# Patient Record
Sex: MALE | Race: WHITE | Employment: OTHER | URBAN - METROPOLITAN AREA
[De-identification: names, ages, dates, MRNs, and addresses within clinical notes are randomized per-mention and may not be internally consistent; named-entity substitution may affect disease eponyms.]

---

## 2017-01-04 RX ORDER — EMPAGLIFLOZIN AND METFORMIN HYDROCHLORIDE 5; 1000 MG/1; MG/1
TABLET ORAL
Qty: 60 TABLET | Refills: 2 | OUTPATIENT
Start: 2017-01-04

## 2017-01-05 LAB
ANION GAP SERPL CALCULATED.3IONS-SCNC: 9 MMOL/L (ref 4–12)
BUN BLDV-MCNC: 22 MG/DL (ref 7–20)
CALCIUM SERPL-MCNC: 9.9 MG/DL (ref 8.8–10.5)
CHLORIDE BLD-SCNC: 103 MEQ/L (ref 101–111)
CO2: 27 MEQ/L (ref 21–32)
CREAT SERPL-MCNC: 1 MG/DL (ref 0.7–1.3)
CREATININE CLEARANCE: >60
ESTIMATED AVERAGE GLUCOSE: 157 MG/DL
GLUCOSE, FASTING: 142 MG/DL (ref 70–110)
HBA1C MFR BLD: 7.1 % (ref 4.4–6.4)
POTASSIUM SERPL-SCNC: 4.3 MEQ/L (ref 3.6–5)
SODIUM BLD-SCNC: 139 MEQ/L (ref 135–145)

## 2017-01-11 RX ORDER — EMPAGLIFLOZIN AND METFORMIN HYDROCHLORIDE 5; 1000 MG/1; MG/1
TABLET ORAL
Qty: 60 TABLET | Refills: 2 | OUTPATIENT
Start: 2017-01-11

## 2017-01-11 RX ORDER — EMPAGLIFLOZIN AND METFORMIN HYDROCHLORIDE 5; 1000 MG/1; MG/1
TABLET ORAL
Qty: 60 TABLET | Refills: 0 | Status: SHIPPED | OUTPATIENT
Start: 2017-01-11 | End: 2017-01-16 | Stop reason: SDUPTHER

## 2017-01-16 RX ORDER — EMPAGLIFLOZIN AND METFORMIN HYDROCHLORIDE 5; 1000 MG/1; MG/1
TABLET ORAL
Qty: 14 TABLET | Refills: 0 | COMMUNITY
Start: 2017-01-16 | End: 2017-02-27 | Stop reason: SDUPTHER

## 2017-02-27 ENCOUNTER — OFFICE VISIT (OUTPATIENT)
Dept: FAMILY MEDICINE CLINIC | Age: 57
End: 2017-02-27

## 2017-02-27 VITALS
HEART RATE: 76 BPM | RESPIRATION RATE: 16 BRPM | BODY MASS INDEX: 31.73 KG/M2 | DIASTOLIC BLOOD PRESSURE: 64 MMHG | WEIGHT: 221.6 LBS | HEIGHT: 70 IN | SYSTOLIC BLOOD PRESSURE: 118 MMHG

## 2017-02-27 DIAGNOSIS — E78.5 HYPERLIPIDEMIA, UNSPECIFIED HYPERLIPIDEMIA TYPE: ICD-10-CM

## 2017-02-27 DIAGNOSIS — Z72.0 TOBACCO ABUSE: ICD-10-CM

## 2017-02-27 DIAGNOSIS — I25.10 CORONARY ARTERY DISEASE INVOLVING NATIVE CORONARY ARTERY OF NATIVE HEART WITHOUT ANGINA PECTORIS: ICD-10-CM

## 2017-02-27 DIAGNOSIS — I10 ESSENTIAL HYPERTENSION: ICD-10-CM

## 2017-02-27 DIAGNOSIS — J44.9 CHRONIC OBSTRUCTIVE PULMONARY DISEASE, UNSPECIFIED COPD TYPE (HCC): Primary | ICD-10-CM

## 2017-02-27 DIAGNOSIS — Z12.5 SCREENING FOR PROSTATE CANCER: ICD-10-CM

## 2017-02-27 PROCEDURE — G8484 FLU IMMUNIZE NO ADMIN: HCPCS | Performed by: FAMILY MEDICINE

## 2017-02-27 PROCEDURE — 99214 OFFICE O/P EST MOD 30 MIN: CPT | Performed by: FAMILY MEDICINE

## 2017-02-27 PROCEDURE — 4004F PT TOBACCO SCREEN RCVD TLK: CPT | Performed by: FAMILY MEDICINE

## 2017-02-27 PROCEDURE — G8598 ASA/ANTIPLAT THER USED: HCPCS | Performed by: FAMILY MEDICINE

## 2017-02-27 PROCEDURE — 3023F SPIROM DOC REV: CPT | Performed by: FAMILY MEDICINE

## 2017-02-27 PROCEDURE — G8427 DOCREV CUR MEDS BY ELIG CLIN: HCPCS | Performed by: FAMILY MEDICINE

## 2017-02-27 PROCEDURE — 3017F COLORECTAL CA SCREEN DOC REV: CPT | Performed by: FAMILY MEDICINE

## 2017-02-27 PROCEDURE — 3045F PR MOST RECENT HEMOGLOBIN A1C LEVEL 7.0-9.0%: CPT | Performed by: FAMILY MEDICINE

## 2017-02-27 PROCEDURE — G8926 SPIRO NO PERF OR DOC: HCPCS | Performed by: FAMILY MEDICINE

## 2017-02-27 PROCEDURE — G8417 CALC BMI ABV UP PARAM F/U: HCPCS | Performed by: FAMILY MEDICINE

## 2017-02-27 RX ORDER — EMPAGLIFLOZIN AND METFORMIN HYDROCHLORIDE 5; 1000 MG/1; MG/1
TABLET ORAL
Qty: 60 TABLET | Refills: 11 | Status: SHIPPED | OUTPATIENT
Start: 2017-02-27 | End: 2018-04-13

## 2017-02-27 ASSESSMENT — PATIENT HEALTH QUESTIONNAIRE - PHQ9
SUM OF ALL RESPONSES TO PHQ9 QUESTIONS 1 & 2: 0
1. LITTLE INTEREST OR PLEASURE IN DOING THINGS: 0
2. FEELING DOWN, DEPRESSED OR HOPELESS: 0
SUM OF ALL RESPONSES TO PHQ QUESTIONS 1-9: 0

## 2017-02-27 ASSESSMENT — ENCOUNTER SYMPTOMS
GASTROINTESTINAL NEGATIVE: 1
RESPIRATORY NEGATIVE: 1

## 2017-08-15 ENCOUNTER — APPOINTMENT (OUTPATIENT)
Dept: CT IMAGING | Age: 57
End: 2017-08-15
Payer: COMMERCIAL

## 2017-08-15 ENCOUNTER — HOSPITAL ENCOUNTER (EMERGENCY)
Age: 57
Discharge: HOME | End: 2017-08-15
Attending: FAMILY MEDICINE
Payer: COMMERCIAL

## 2017-08-15 VITALS
TEMPERATURE: 98 F | DIASTOLIC BLOOD PRESSURE: 80 MMHG | OXYGEN SATURATION: 100 % | BODY MASS INDEX: 29.49 KG/M2 | HEIGHT: 70 IN | SYSTOLIC BLOOD PRESSURE: 125 MMHG | WEIGHT: 206 LBS | HEART RATE: 68 BPM | RESPIRATION RATE: 18 BRPM

## 2017-08-15 DIAGNOSIS — K02.9 DENTAL CARIES: ICD-10-CM

## 2017-08-15 DIAGNOSIS — K04.7 DENTAL INFECTION: Primary | ICD-10-CM

## 2017-08-15 DIAGNOSIS — H61.23 BILATERAL IMPACTED CERUMEN: ICD-10-CM

## 2017-08-15 LAB
ANION GAP SERPL CALCULATED.3IONS-SCNC: 16 MEQ/L (ref 8–16)
BASOPHILS # BLD: 0.6 %
BASOPHILS ABSOLUTE: 0.1 THOU/MM3 (ref 0–0.1)
BUN BLDV-MCNC: 19 MG/DL (ref 7–22)
CALCIUM SERPL-MCNC: 9.4 MG/DL (ref 8.5–10.5)
CHLORIDE BLD-SCNC: 104 MEQ/L (ref 98–111)
CO2: 21 MEQ/L (ref 23–33)
CREAT SERPL-MCNC: 1 MG/DL (ref 0.4–1.2)
EOSINOPHIL # BLD: 1.1 %
EOSINOPHILS ABSOLUTE: 0.1 THOU/MM3 (ref 0–0.4)
GFR SERPL CREATININE-BSD FRML MDRD: 77 ML/MIN/1.73M2
GLUCOSE BLD-MCNC: 211 MG/DL (ref 70–108)
HCT VFR BLD CALC: 46.6 % (ref 42–52)
HEMOGLOBIN: 16 GM/DL (ref 14–18)
LYMPHOCYTES # BLD: 24.2 %
LYMPHOCYTES ABSOLUTE: 2.3 THOU/MM3 (ref 1–4.8)
MCH RBC QN AUTO: 33 PG (ref 27–31)
MCHC RBC AUTO-ENTMCNC: 34.5 GM/DL (ref 33–37)
MCV RBC AUTO: 95.8 FL (ref 80–94)
MONOCYTES # BLD: 9.1 %
MONOCYTES ABSOLUTE: 0.9 THOU/MM3 (ref 0.4–1.3)
NUCLEATED RED BLOOD CELLS: 0 /100 WBC
OSMOLALITY CALCULATION: 289.8 MOSMOL/KG (ref 275–300)
PDW BLD-RTO: 14.4 % (ref 11.5–14.5)
PLATELET # BLD: 227 THOU/MM3 (ref 130–400)
PMV BLD AUTO: 8.1 MCM (ref 7.4–10.4)
POTASSIUM SERPL-SCNC: 4.3 MEQ/L (ref 3.5–5.2)
RBC # BLD: 4.86 MILL/MM3 (ref 4.7–6.1)
RBC # BLD: NORMAL 10*6/UL
SEG NEUTROPHILS: 65 %
SEGMENTED NEUTROPHILS ABSOLUTE COUNT: 6.1 THOU/MM3 (ref 1.8–7.7)
SODIUM BLD-SCNC: 141 MEQ/L (ref 135–145)
WBC # BLD: 9.4 THOU/MM3 (ref 4.8–10.8)

## 2017-08-15 PROCEDURE — 36415 COLL VENOUS BLD VENIPUNCTURE: CPT

## 2017-08-15 PROCEDURE — 85025 COMPLETE CBC W/AUTO DIFF WBC: CPT

## 2017-08-15 PROCEDURE — 80048 BASIC METABOLIC PNL TOTAL CA: CPT

## 2017-08-15 PROCEDURE — 6360000004 HC RX CONTRAST MEDICATION: Performed by: FAMILY MEDICINE

## 2017-08-15 PROCEDURE — 70491 CT SOFT TISSUE NECK W/DYE: CPT

## 2017-08-15 PROCEDURE — 99283 EMERGENCY DEPT VISIT LOW MDM: CPT

## 2017-08-15 PROCEDURE — 2580000003 HC RX 258: Performed by: FAMILY MEDICINE

## 2017-08-15 PROCEDURE — 96360 HYDRATION IV INFUSION INIT: CPT

## 2017-08-15 RX ORDER — 0.9 % SODIUM CHLORIDE 0.9 %
500 INTRAVENOUS SOLUTION INTRAVENOUS ONCE
Status: COMPLETED | OUTPATIENT
Start: 2017-08-15 | End: 2017-08-15

## 2017-08-15 RX ORDER — PENICILLIN V POTASSIUM 500 MG/1
500 TABLET ORAL 4 TIMES DAILY
Qty: 28 TABLET | Refills: 0 | Status: SHIPPED | OUTPATIENT
Start: 2017-08-15 | End: 2017-08-22

## 2017-08-15 RX ORDER — HYDROCODONE BITARTRATE AND ACETAMINOPHEN 5; 325 MG/1; MG/1
1-2 TABLET ORAL EVERY 6 HOURS PRN
Qty: 20 TABLET | Refills: 0 | Status: SHIPPED | OUTPATIENT
Start: 2017-08-15 | End: 2017-08-22

## 2017-08-15 RX ADMIN — SODIUM CHLORIDE 500 ML: 9 INJECTION, SOLUTION INTRAVENOUS at 14:47

## 2017-08-15 RX ADMIN — IOPAMIDOL 80 ML: 755 INJECTION, SOLUTION INTRAVENOUS at 14:10

## 2017-08-15 ASSESSMENT — ENCOUNTER SYMPTOMS
SORE THROAT: 0
RHINORRHEA: 0
DIARRHEA: 0
EYE REDNESS: 0
SHORTNESS OF BREATH: 0
COUGH: 0
WHEEZING: 0
ABDOMINAL PAIN: 0
VOMITING: 0
NAUSEA: 0
BACK PAIN: 0
EYE DISCHARGE: 0

## 2017-08-15 ASSESSMENT — PAIN DESCRIPTION - DESCRIPTORS: DESCRIPTORS: ACHING;PRESSURE

## 2017-08-15 ASSESSMENT — PAIN DESCRIPTION - ORIENTATION: ORIENTATION: RIGHT

## 2017-08-15 ASSESSMENT — PAIN DESCRIPTION - LOCATION: LOCATION: FACE;EAR;JAW

## 2017-08-15 ASSESSMENT — PAIN DESCRIPTION - PAIN TYPE: TYPE: ACUTE PAIN

## 2017-08-15 ASSESSMENT — PAIN SCALES - GENERAL: PAINLEVEL_OUTOF10: 5

## 2017-08-22 ENCOUNTER — TELEPHONE (OUTPATIENT)
Dept: FAMILY MEDICINE CLINIC | Age: 57
End: 2017-08-22

## 2018-03-30 ENCOUNTER — HOSPITAL ENCOUNTER (OUTPATIENT)
Age: 58
Discharge: HOME | End: 2018-03-30
Payer: COMMERCIAL

## 2018-03-30 LAB
ALBUMIN SERPL-MCNC: 4.3 G/DL (ref 3.5–5.1)
ALP BLD-CCNC: 73 U/L (ref 38–126)
ALT SERPL-CCNC: 16 U/L (ref 11–66)
ANION GAP SERPL CALCULATED.3IONS-SCNC: 15 MEQ/L (ref 8–16)
AST SERPL-CCNC: 13 U/L (ref 5–40)
BILIRUB SERPL-MCNC: 0.5 MG/DL (ref 0.3–1.2)
BILIRUBIN DIRECT: < 0.2 MG/DL (ref 0–0.3)
BUN BLDV-MCNC: 21 MG/DL (ref 7–22)
CALCIUM SERPL-MCNC: 9.8 MG/DL (ref 8.5–10.5)
CHLORIDE BLD-SCNC: 100 MEQ/L (ref 98–111)
CHOLESTEROL, TOTAL: 161 MG/DL (ref 100–199)
CO2: 24 MEQ/L (ref 23–33)
CREAT SERPL-MCNC: 0.9 MG/DL (ref 0.4–1.2)
GFR SERPL CREATININE-BSD FRML MDRD: 87 ML/MIN/1.73M2
GLUCOSE BLD-MCNC: 305 MG/DL (ref 70–108)
HDLC SERPL-MCNC: 45 MG/DL
LDL CHOLESTEROL CALCULATED: 85 MG/DL
POTASSIUM SERPL-SCNC: 4.2 MEQ/L (ref 3.5–5.2)
SODIUM BLD-SCNC: 139 MEQ/L (ref 135–145)
TOTAL PROTEIN: 8.1 G/DL (ref 6.1–8)
TRIGL SERPL-MCNC: 154 MG/DL (ref 0–199)

## 2018-03-30 PROCEDURE — 80053 COMPREHEN METABOLIC PANEL: CPT

## 2018-03-30 PROCEDURE — 82248 BILIRUBIN DIRECT: CPT

## 2018-03-30 PROCEDURE — 80061 LIPID PANEL: CPT

## 2018-03-30 PROCEDURE — 36415 COLL VENOUS BLD VENIPUNCTURE: CPT

## 2018-04-09 ENCOUNTER — TELEPHONE (OUTPATIENT)
Dept: FAMILY MEDICINE CLINIC | Age: 58
End: 2018-04-09

## 2018-04-11 DIAGNOSIS — E78.49 OTHER HYPERLIPIDEMIA: ICD-10-CM

## 2018-04-11 DIAGNOSIS — Z12.5 SCREENING FOR PROSTATE CANCER: ICD-10-CM

## 2018-04-12 ENCOUNTER — HOSPITAL ENCOUNTER (OUTPATIENT)
Age: 58
Discharge: HOME | End: 2018-04-12
Payer: COMMERCIAL

## 2018-04-12 DIAGNOSIS — E78.49 OTHER HYPERLIPIDEMIA: ICD-10-CM

## 2018-04-12 DIAGNOSIS — Z12.5 SCREENING FOR PROSTATE CANCER: ICD-10-CM

## 2018-04-12 LAB
AVERAGE GLUCOSE: 285 MG/DL (ref 70–126)
CREATININE, URINE: 13.1 MG/DL
HBA1C MFR BLD: 11.5 % (ref 4.4–6.4)
MICROALBUMIN UR-MCNC: < 1.2 MG/DL
MICROALBUMIN/CREAT UR-RTO: 92 MG/G (ref 0–30)
PROSTATE SPECIFIC ANTIGEN: 0.61 NG/ML (ref 0–1)
TSH SERPL DL<=0.05 MIU/L-ACNC: 1.92 UIU/ML (ref 0.4–4.2)

## 2018-04-12 PROCEDURE — 82043 UR ALBUMIN QUANTITATIVE: CPT

## 2018-04-12 PROCEDURE — 36415 COLL VENOUS BLD VENIPUNCTURE: CPT

## 2018-04-12 PROCEDURE — 84153 ASSAY OF PSA TOTAL: CPT

## 2018-04-12 PROCEDURE — 83036 HEMOGLOBIN GLYCOSYLATED A1C: CPT

## 2018-04-12 PROCEDURE — 84443 ASSAY THYROID STIM HORMONE: CPT

## 2018-04-13 ENCOUNTER — OFFICE VISIT (OUTPATIENT)
Dept: FAMILY MEDICINE CLINIC | Age: 58
End: 2018-04-13
Payer: COMMERCIAL

## 2018-04-13 VITALS
WEIGHT: 194.2 LBS | HEART RATE: 68 BPM | OXYGEN SATURATION: 96 % | HEIGHT: 69 IN | SYSTOLIC BLOOD PRESSURE: 118 MMHG | RESPIRATION RATE: 16 BRPM | BODY MASS INDEX: 28.76 KG/M2 | DIASTOLIC BLOOD PRESSURE: 62 MMHG

## 2018-04-13 DIAGNOSIS — I25.10 CORONARY ARTERY DISEASE INVOLVING NATIVE CORONARY ARTERY OF NATIVE HEART WITHOUT ANGINA PECTORIS: ICD-10-CM

## 2018-04-13 DIAGNOSIS — E78.49 OTHER HYPERLIPIDEMIA: ICD-10-CM

## 2018-04-13 DIAGNOSIS — J44.9 CHRONIC OBSTRUCTIVE PULMONARY DISEASE, UNSPECIFIED COPD TYPE (HCC): ICD-10-CM

## 2018-04-13 DIAGNOSIS — Z72.0 TOBACCO ABUSE: ICD-10-CM

## 2018-04-13 DIAGNOSIS — I10 ESSENTIAL HYPERTENSION: ICD-10-CM

## 2018-04-13 PROCEDURE — 1036F TOBACCO NON-USER: CPT | Performed by: FAMILY MEDICINE

## 2018-04-13 PROCEDURE — G8926 SPIRO NO PERF OR DOC: HCPCS | Performed by: FAMILY MEDICINE

## 2018-04-13 PROCEDURE — G8419 CALC BMI OUT NRM PARAM NOF/U: HCPCS | Performed by: FAMILY MEDICINE

## 2018-04-13 PROCEDURE — G8598 ASA/ANTIPLAT THER USED: HCPCS | Performed by: FAMILY MEDICINE

## 2018-04-13 PROCEDURE — G8427 DOCREV CUR MEDS BY ELIG CLIN: HCPCS | Performed by: FAMILY MEDICINE

## 2018-04-13 PROCEDURE — 3023F SPIROM DOC REV: CPT | Performed by: FAMILY MEDICINE

## 2018-04-13 PROCEDURE — 3017F COLORECTAL CA SCREEN DOC REV: CPT | Performed by: FAMILY MEDICINE

## 2018-04-13 PROCEDURE — 2022F DILAT RTA XM EVC RTNOPTHY: CPT | Performed by: FAMILY MEDICINE

## 2018-04-13 PROCEDURE — 3046F HEMOGLOBIN A1C LEVEL >9.0%: CPT | Performed by: FAMILY MEDICINE

## 2018-04-13 PROCEDURE — 99214 OFFICE O/P EST MOD 30 MIN: CPT | Performed by: FAMILY MEDICINE

## 2018-04-13 RX ORDER — PIOGLITAZONEHYDROCHLORIDE 30 MG/1
30 TABLET ORAL DAILY
Qty: 30 TABLET | Refills: 2 | Status: SHIPPED | OUTPATIENT
Start: 2018-04-13 | End: 2018-07-12 | Stop reason: SDUPTHER

## 2018-04-13 ASSESSMENT — PATIENT HEALTH QUESTIONNAIRE - PHQ9
SUM OF ALL RESPONSES TO PHQ9 QUESTIONS 1 & 2: 0
1. LITTLE INTEREST OR PLEASURE IN DOING THINGS: 0
SUM OF ALL RESPONSES TO PHQ QUESTIONS 1-9: 0
2. FEELING DOWN, DEPRESSED OR HOPELESS: 0

## 2018-04-13 ASSESSMENT — ENCOUNTER SYMPTOMS
GASTROINTESTINAL NEGATIVE: 1
RESPIRATORY NEGATIVE: 1

## 2018-04-16 ENCOUNTER — TELEPHONE (OUTPATIENT)
Dept: FAMILY MEDICINE CLINIC | Age: 58
End: 2018-04-16

## 2018-05-07 RX ORDER — METOPROLOL SUCCINATE 50 MG/1
50 TABLET, EXTENDED RELEASE ORAL EVERY MORNING
COMMUNITY
End: 2022-07-11

## 2018-05-09 RX ORDER — ASPIRIN 325 MG
325 TABLET ORAL ONCE
Status: CANCELLED | OUTPATIENT
Start: 2018-05-09 | End: 2018-05-09

## 2018-05-09 RX ORDER — SODIUM CHLORIDE 0.9 % (FLUSH) 0.9 %
10 SYRINGE (ML) INJECTION PRN
Status: CANCELLED | OUTPATIENT
Start: 2018-05-09

## 2018-05-09 RX ORDER — SODIUM CHLORIDE 9 MG/ML
INJECTION, SOLUTION INTRAVENOUS CONTINUOUS
Status: CANCELLED | OUTPATIENT
Start: 2018-05-09

## 2018-05-09 RX ORDER — DIPHENHYDRAMINE HCL 25 MG
50 TABLET ORAL ONCE
Status: CANCELLED | OUTPATIENT
Start: 2018-05-09 | End: 2018-05-09

## 2018-05-10 ENCOUNTER — HOSPITAL ENCOUNTER (OUTPATIENT)
Dept: INPATIENT UNIT | Age: 58
Discharge: HOME | End: 2018-05-10

## 2018-05-10 ENCOUNTER — HOSPITAL ENCOUNTER (OUTPATIENT)
Dept: INPATIENT UNIT | Age: 58
Discharge: HOME | End: 2018-05-10
Attending: INTERNAL MEDICINE | Admitting: INTERNAL MEDICINE
Payer: COMMERCIAL

## 2018-05-10 ENCOUNTER — APPOINTMENT (OUTPATIENT)
Dept: CARDIAC CATH/INVASIVE PROCEDURES | Age: 58
End: 2018-05-10
Attending: INTERNAL MEDICINE
Payer: COMMERCIAL

## 2018-05-10 VITALS
OXYGEN SATURATION: 98 % | TEMPERATURE: 97.6 F | HEIGHT: 69 IN | HEART RATE: 63 BPM | DIASTOLIC BLOOD PRESSURE: 53 MMHG | RESPIRATION RATE: 18 BRPM | WEIGHT: 194 LBS | SYSTOLIC BLOOD PRESSURE: 93 MMHG | BODY MASS INDEX: 28.73 KG/M2

## 2018-05-10 LAB
ABO: NORMAL
ALBUMIN SERPL-MCNC: 4 G/DL (ref 3.5–5.1)
ALP BLD-CCNC: 58 U/L (ref 38–126)
ALT SERPL-CCNC: 15 U/L (ref 11–66)
ANION GAP SERPL CALCULATED.3IONS-SCNC: 9 MEQ/L (ref 8–16)
ANTIBODY SCREEN: NORMAL
AST SERPL-CCNC: 12 U/L (ref 5–40)
BILIRUB SERPL-MCNC: 0.3 MG/DL (ref 0.3–1.2)
BUN BLDV-MCNC: 21 MG/DL (ref 7–22)
CALCIUM SERPL-MCNC: 9.2 MG/DL (ref 8.5–10.5)
CHLORIDE BLD-SCNC: 101 MEQ/L (ref 98–111)
CHOLESTEROL, TOTAL: 145 MG/DL (ref 100–199)
CO2: 25 MEQ/L (ref 23–33)
CREAT SERPL-MCNC: 0.9 MG/DL (ref 0.4–1.2)
EKG ATRIAL RATE: 57 BPM
EKG P AXIS: 7 DEGREES
EKG P-R INTERVAL: 150 MS
EKG Q-T INTERVAL: 454 MS
EKG QRS DURATION: 104 MS
EKG QTC CALCULATION (BAZETT): 441 MS
EKG R AXIS: 78 DEGREES
EKG T AXIS: 68 DEGREES
EKG VENTRICULAR RATE: 57 BPM
GFR SERPL CREATININE-BSD FRML MDRD: 87 ML/MIN/1.73M2
GLUCOSE BLD-MCNC: 193 MG/DL (ref 70–108)
HCT VFR BLD CALC: 43.7 % (ref 42–52)
HDLC SERPL-MCNC: 48 MG/DL
HEMOGLOBIN: 15.2 GM/DL (ref 14–18)
LDL CHOLESTEROL CALCULATED: 83 MG/DL
MCH RBC QN AUTO: 32.8 PG (ref 27–31)
MCHC RBC AUTO-ENTMCNC: 34.7 GM/DL (ref 33–37)
MCV RBC AUTO: 94.5 FL (ref 80–94)
PDW BLD-RTO: 14.6 % (ref 11.5–14.5)
PLATELET # BLD: 219 THOU/MM3 (ref 130–400)
PMV BLD AUTO: 7.9 FL (ref 7.4–10.4)
POTASSIUM REFLEX MAGNESIUM: 5.1 MEQ/L (ref 3.5–5.2)
RBC # BLD: 4.62 MILL/MM3 (ref 4.7–6.1)
RH FACTOR: NORMAL
SODIUM BLD-SCNC: 135 MEQ/L (ref 135–145)
TOTAL PROTEIN: 7.1 G/DL (ref 6.1–8)
TRIGL SERPL-MCNC: 72 MG/DL (ref 0–199)
WBC # BLD: 6.5 THOU/MM3 (ref 4.8–10.8)

## 2018-05-10 PROCEDURE — 36415 COLL VENOUS BLD VENIPUNCTURE: CPT

## 2018-05-10 PROCEDURE — 93010 ELECTROCARDIOGRAM REPORT: CPT | Performed by: INTERNAL MEDICINE

## 2018-05-10 PROCEDURE — 86850 RBC ANTIBODY SCREEN: CPT

## 2018-05-10 PROCEDURE — 86900 BLOOD TYPING SEROLOGIC ABO: CPT

## 2018-05-10 PROCEDURE — C1760 CLOSURE DEV, VASC: HCPCS

## 2018-05-10 PROCEDURE — 2780000010 HC IMPLANT OTHER

## 2018-05-10 PROCEDURE — 85027 COMPLETE CBC AUTOMATED: CPT

## 2018-05-10 PROCEDURE — 80053 COMPREHEN METABOLIC PANEL: CPT

## 2018-05-10 PROCEDURE — 80061 LIPID PANEL: CPT

## 2018-05-10 PROCEDURE — 96361 HYDRATE IV INFUSION ADD-ON: CPT

## 2018-05-10 PROCEDURE — 86901 BLOOD TYPING SEROLOGIC RH(D): CPT

## 2018-05-10 PROCEDURE — 96360 HYDRATION IV INFUSION INIT: CPT

## 2018-05-10 PROCEDURE — C1894 INTRO/SHEATH, NON-LASER: HCPCS

## 2018-05-10 PROCEDURE — C1769 GUIDE WIRE: HCPCS

## 2018-05-10 PROCEDURE — 93459 L HRT ART/GRFT ANGIO: CPT | Performed by: INTERNAL MEDICINE

## 2018-05-10 PROCEDURE — 6360000002 HC RX W HCPCS

## 2018-05-10 PROCEDURE — 2500000003 HC RX 250 WO HCPCS

## 2018-05-10 PROCEDURE — 93005 ELECTROCARDIOGRAM TRACING: CPT | Performed by: INTERNAL MEDICINE

## 2018-05-10 PROCEDURE — 2580000003 HC RX 258: Performed by: INTERNAL MEDICINE

## 2018-05-10 RX ORDER — SODIUM CHLORIDE 9 MG/ML
INJECTION, SOLUTION INTRAVENOUS CONTINUOUS
Status: DISCONTINUED | OUTPATIENT
Start: 2018-05-10 | End: 2018-05-10 | Stop reason: HOSPADM

## 2018-05-10 RX ORDER — ONDANSETRON 2 MG/ML
4 INJECTION INTRAMUSCULAR; INTRAVENOUS EVERY 6 HOURS PRN
Status: DISCONTINUED | OUTPATIENT
Start: 2018-05-10 | End: 2018-05-10 | Stop reason: HOSPADM

## 2018-05-10 RX ORDER — SODIUM CHLORIDE 9 MG/ML
100 INJECTION, SOLUTION INTRAVENOUS CONTINUOUS
Status: DISCONTINUED | OUTPATIENT
Start: 2018-05-10 | End: 2018-05-10 | Stop reason: HOSPADM

## 2018-05-10 RX ORDER — SODIUM CHLORIDE 0.9 % (FLUSH) 0.9 %
10 SYRINGE (ML) INJECTION PRN
Status: DISCONTINUED | OUTPATIENT
Start: 2018-05-10 | End: 2018-05-10 | Stop reason: HOSPADM

## 2018-05-10 RX ORDER — ASPIRIN 325 MG
325 TABLET ORAL ONCE
Status: DISCONTINUED | OUTPATIENT
Start: 2018-05-10 | End: 2018-05-10 | Stop reason: HOSPADM

## 2018-05-10 RX ORDER — SODIUM CHLORIDE 0.9 % (FLUSH) 0.9 %
10 SYRINGE (ML) INJECTION EVERY 12 HOURS SCHEDULED
Status: DISCONTINUED | OUTPATIENT
Start: 2018-05-10 | End: 2018-05-10 | Stop reason: HOSPADM

## 2018-05-10 RX ORDER — ATROPINE SULFATE 0.4 MG/ML
0.5 AMPUL (ML) INJECTION
Status: DISCONTINUED | OUTPATIENT
Start: 2018-05-10 | End: 2018-05-10 | Stop reason: HOSPADM

## 2018-05-10 RX ORDER — ACETAMINOPHEN 325 MG/1
650 TABLET ORAL EVERY 4 HOURS PRN
Status: DISCONTINUED | OUTPATIENT
Start: 2018-05-10 | End: 2018-05-10 | Stop reason: HOSPADM

## 2018-05-10 RX ADMIN — SODIUM CHLORIDE: 9 INJECTION, SOLUTION INTRAVENOUS at 11:49

## 2018-05-11 ENCOUNTER — TELEPHONE (OUTPATIENT)
Dept: FAMILY MEDICINE CLINIC | Age: 58
End: 2018-05-11

## 2018-07-03 ENCOUNTER — TELEPHONE (OUTPATIENT)
Dept: FAMILY MEDICINE CLINIC | Age: 58
End: 2018-07-03

## 2018-07-07 ENCOUNTER — HOSPITAL ENCOUNTER (OUTPATIENT)
Age: 58
Discharge: HOME | End: 2018-07-07
Payer: COMMERCIAL

## 2018-07-07 LAB
AVERAGE GLUCOSE: 177 MG/DL (ref 70–126)
HBA1C MFR BLD: 7.9 % (ref 4.4–6.4)

## 2018-07-07 PROCEDURE — 36415 COLL VENOUS BLD VENIPUNCTURE: CPT

## 2018-07-07 PROCEDURE — 83036 HEMOGLOBIN GLYCOSYLATED A1C: CPT

## 2018-07-12 ENCOUNTER — OFFICE VISIT (OUTPATIENT)
Dept: FAMILY MEDICINE CLINIC | Age: 58
End: 2018-07-12
Payer: COMMERCIAL

## 2018-07-12 VITALS
HEIGHT: 69 IN | SYSTOLIC BLOOD PRESSURE: 118 MMHG | WEIGHT: 205.9 LBS | DIASTOLIC BLOOD PRESSURE: 62 MMHG | BODY MASS INDEX: 30.49 KG/M2 | HEART RATE: 67 BPM | OXYGEN SATURATION: 97 % | RESPIRATION RATE: 14 BRPM

## 2018-07-12 DIAGNOSIS — E78.5 HYPERLIPIDEMIA, UNSPECIFIED HYPERLIPIDEMIA TYPE: ICD-10-CM

## 2018-07-12 DIAGNOSIS — J44.9 CHRONIC OBSTRUCTIVE PULMONARY DISEASE, UNSPECIFIED COPD TYPE (HCC): Primary | ICD-10-CM

## 2018-07-12 DIAGNOSIS — I10 ESSENTIAL HYPERTENSION: ICD-10-CM

## 2018-07-12 DIAGNOSIS — E66.9 OBESITY (BMI 30-39.9): ICD-10-CM

## 2018-07-12 DIAGNOSIS — I25.10 CORONARY ARTERY DISEASE INVOLVING NATIVE CORONARY ARTERY OF NATIVE HEART WITHOUT ANGINA PECTORIS: ICD-10-CM

## 2018-07-12 DIAGNOSIS — I51.89 MILD LEFT VENTRICULAR SYSTOLIC DYSFUNCTION: ICD-10-CM

## 2018-07-12 DIAGNOSIS — Z72.0 TOBACCO ABUSE: ICD-10-CM

## 2018-07-12 PROCEDURE — G8417 CALC BMI ABV UP PARAM F/U: HCPCS | Performed by: FAMILY MEDICINE

## 2018-07-12 PROCEDURE — G8427 DOCREV CUR MEDS BY ELIG CLIN: HCPCS | Performed by: FAMILY MEDICINE

## 2018-07-12 PROCEDURE — G8598 ASA/ANTIPLAT THER USED: HCPCS | Performed by: FAMILY MEDICINE

## 2018-07-12 PROCEDURE — 3023F SPIROM DOC REV: CPT | Performed by: FAMILY MEDICINE

## 2018-07-12 PROCEDURE — 3045F PR MOST RECENT HEMOGLOBIN A1C LEVEL 7.0-9.0%: CPT | Performed by: FAMILY MEDICINE

## 2018-07-12 PROCEDURE — 99214 OFFICE O/P EST MOD 30 MIN: CPT | Performed by: FAMILY MEDICINE

## 2018-07-12 PROCEDURE — 2022F DILAT RTA XM EVC RTNOPTHY: CPT | Performed by: FAMILY MEDICINE

## 2018-07-12 PROCEDURE — 4004F PT TOBACCO SCREEN RCVD TLK: CPT | Performed by: FAMILY MEDICINE

## 2018-07-12 PROCEDURE — 3017F COLORECTAL CA SCREEN DOC REV: CPT | Performed by: FAMILY MEDICINE

## 2018-07-12 PROCEDURE — G8926 SPIRO NO PERF OR DOC: HCPCS | Performed by: FAMILY MEDICINE

## 2018-07-12 RX ORDER — PIOGLITAZONEHYDROCHLORIDE 30 MG/1
30 TABLET ORAL DAILY
Qty: 90 TABLET | Refills: 3 | Status: SHIPPED | OUTPATIENT
Start: 2018-07-12 | End: 2020-04-24

## 2018-07-12 ASSESSMENT — ENCOUNTER SYMPTOMS
GASTROINTESTINAL NEGATIVE: 1
RESPIRATORY NEGATIVE: 1

## 2018-07-12 NOTE — PATIENT INSTRUCTIONS
You may receive a survey about your visit with us today. The feedback from our patients helps us identify what is working well and where the service to all patients can be enhanced. Thank you! Tobacco Cessation Programs     Telephonic behavior modification  Saúl Rodriguez ([phone])   Counseling service for those who are ready to quit using tobacco.     Available for uninsured [place] residents, [place] recipients, pregnant women, or patients whose health plans or employers are members of the [address] [place] [address] behavior modification   http://[place]. [employer]uitlogix. org   Online support program to help patients through each step of the quitting process. Available 24 hours a day 7 days a week. Provides up to date researched based tool, step-by-step guides, and motivational messages. Online behavior modification   www.lungusa.org/stop-smoking/how-to-quit   HelpLine: 1-800-LUNG-[place] (989-2779)   Email questions to:  Leatha@[employer]. [employer]    Website offers resources to help tobacco users figure out their reasons for quitting and then take the big step of quitting for good. Hypnosis   Location: [facility], [address] [place] [employer], [place]   Contact: Areli Huitron, PhD at [phone]   Hypnosis for tobacco cessation   Cost $225 for the initial session and $175 for each session afterwards. Most patients require 6-8 sessions. There is the option to submit through the patients insurance. Hypnosis and behavior modification   Location: [facility] [address] [place] [employer], [place]   Contact: Rah Olea, PhD at [phone]  [facility] Counseling and hypnosis for nicotine addition   Cost: For uninsured patients:  Please call above phone number  Cost for insured patients depends on patients insurance plan.     Behavior modification   Location: [facility], [address] [place] [employer] [place], [address]: [place] [address] include four one-on-one appointments between the patient and a respiratory therapist.  The four appointments span over three weeks. The respiratory therapist schedules one of the appointments to occur 48 hours after the patients quit date.  Cost $100 total for the four sessions.   Tobacco cessation products are not included in the cost and are not provided by [facility].     [facility]

## 2018-07-12 NOTE — PROGRESS NOTES
place, and time. Skin: Skin is warm and dry. Psychiatric: He has a normal mood and affect. His behavior is normal.   Nursing note and vitals reviewed. Assessment:       Diagnosis Orders   1. Chronic obstructive pulmonary disease, unspecified COPD type ([facility] [address]     2. Uncontrolled type 2 diabetes mellitus without complication, without long-term current use of insulin (HCC)  metFORMIN (GLUCOPHAGE) 500 MG tablet    pioglitazone (ACTOS) 30 MG tablet    Hemoglobin A1C   3. Coronary artery disease involving native coronary artery of native heart without angina pectoris     4. Hyperlipidemia, unspecified hyperlipidemia type     5. Essential hypertension     6. Tobacco abuse     7. Obesity (BMI 30-39.9)     8.  Mild left ventricular systolic dysfunction           Plan:      -  Chronic medical problems stable  -  Continue current medications for now, did gain 10 lbs on the Actos but sugars responded nicely  -  Hx of mild LV dysfunction 45-50%, ok to continue Actos for now  -  Follow up with specialists as scheduled  -  Labs reviewed, A1C much improved  -  Encourage regular exercise, smoking cessation  -  Declines colon cancer screening  -  Recheck A1C 6 mos  -  RTO 6 mos

## 2018-12-31 ENCOUNTER — TELEPHONE (OUTPATIENT)
Dept: FAMILY MEDICINE CLINIC | Age: 58
End: 2018-12-31

## 2020-04-24 ENCOUNTER — OFFICE VISIT (OUTPATIENT)
Dept: PRIMARY CARE CLINIC | Age: 60
End: 2020-04-24
Payer: COMMERCIAL

## 2020-04-24 VITALS
BODY MASS INDEX: 29.49 KG/M2 | RESPIRATION RATE: 16 BRPM | SYSTOLIC BLOOD PRESSURE: 130 MMHG | WEIGHT: 206 LBS | TEMPERATURE: 97.7 F | HEART RATE: 78 BPM | DIASTOLIC BLOOD PRESSURE: 74 MMHG | HEIGHT: 70 IN

## 2020-04-24 PROCEDURE — 3017F COLORECTAL CA SCREEN DOC REV: CPT | Performed by: FAMILY MEDICINE

## 2020-04-24 PROCEDURE — 2022F DILAT RTA XM EVC RTNOPTHY: CPT | Performed by: FAMILY MEDICINE

## 2020-04-24 PROCEDURE — G8427 DOCREV CUR MEDS BY ELIG CLIN: HCPCS | Performed by: FAMILY MEDICINE

## 2020-04-24 PROCEDURE — 3046F HEMOGLOBIN A1C LEVEL >9.0%: CPT | Performed by: FAMILY MEDICINE

## 2020-04-24 PROCEDURE — G8419 CALC BMI OUT NRM PARAM NOF/U: HCPCS | Performed by: FAMILY MEDICINE

## 2020-04-24 PROCEDURE — 99214 OFFICE O/P EST MOD 30 MIN: CPT | Performed by: FAMILY MEDICINE

## 2020-04-24 PROCEDURE — 3023F SPIROM DOC REV: CPT | Performed by: FAMILY MEDICINE

## 2020-04-24 PROCEDURE — G8926 SPIRO NO PERF OR DOC: HCPCS | Performed by: FAMILY MEDICINE

## 2020-04-24 PROCEDURE — 4004F PT TOBACCO SCREEN RCVD TLK: CPT | Performed by: FAMILY MEDICINE

## 2020-04-24 ASSESSMENT — PATIENT HEALTH QUESTIONNAIRE - PHQ9
2. FEELING DOWN, DEPRESSED OR HOPELESS: 0
1. LITTLE INTEREST OR PLEASURE IN DOING THINGS: 0
SUM OF ALL RESPONSES TO PHQ QUESTIONS 1-9: 0
SUM OF ALL RESPONSES TO PHQ QUESTIONS 1-9: 0
SUM OF ALL RESPONSES TO PHQ9 QUESTIONS 1 & 2: 0

## 2020-04-24 ASSESSMENT — ENCOUNTER SYMPTOMS
RESPIRATORY NEGATIVE: 1
GASTROINTESTINAL NEGATIVE: 1

## 2020-04-24 NOTE — PROGRESS NOTES
(TOPROL XL) 50 MG extended release tablet Take 50 mg by mouth every morning   Yes Historical Provider, MD   glucose blood VI test strips (FREESTYLE LITE) strip TEST twice a day 5/1/18  Yes Moise Rekha, DO   FREESTYLE LANCETS MISC TEST twice a day 7/1/16  Yes Moise Rekha, DO   losartan (COZAAR) 25 MG tablet Take 1 tablet by mouth daily 2/4/16  Yes Lily Luz MD   atorvastatin (LIPITOR) 80 MG tablet Take 1 tablet by mouth nightly 8/19/15  Yes Moise Rekha, DO   clopidogrel (PLAVIX) 75 MG tablet Take 1 tablet by mouth daily 5/5/15  Yes Moise Rekha, DO   furosemide (LASIX) 40 MG tablet Take 1 tablet by mouth daily 5/5/15  Yes Moise Rekha, DO   NITROGLYCERIN PO Place  under the tongue as needed. Yes Historical Provider, MD   aspirin 325 MG EC tablet Take 325 mg by mouth daily.      Yes Historical Provider, MD       Lab Results   Component Value Date    LABA1C 7.9 (H) 07/07/2018     Lab Results   Component Value Date     01/05/2017       No components found for: CHLPL  Lab Results   Component Value Date    TRIG 72 05/10/2018    TRIG 154 03/30/2018    TRIG 104 07/02/2016     Lab Results   Component Value Date    HDL 48 05/10/2018    HDL 45 03/30/2018    HDL 46 07/02/2016     Lab Results   Component Value Date    LDLCALC 83 05/10/2018    LDLCALC 85 03/30/2018    LDLCALC 86 07/02/2016     No results found for: LABVLDL      Chemistry        Component Value Date/Time     05/10/2018 1110    K 5.1 05/10/2018 1110     05/10/2018 1110    CO2 25 05/10/2018 1110    BUN 21 05/10/2018 1110    CREATININE 0.9 05/10/2018 1110        Component Value Date/Time    CALCIUM 9.2 05/10/2018 1110    ALKPHOS 58 05/10/2018 1110    AST 12 05/10/2018 1110    ALT 15 05/10/2018 1110    BILITOT 0.3 05/10/2018 1110            Lab Results   Component Value Date    TSH 1.920 04/12/2018       Lab Results   Component Value Date    WBC 6.5 05/10/2018    HGB 15.2 05/10/2018    HCT 43.7 05/10/2018 MCV 94.5 (H) 05/10/2018     05/10/2018         Health Maintenance   Topic Date Due    Hepatitis C screen  1960    Pneumococcal 0-64 years Vaccine (1 of 1 - PPSV23) 04/18/1966    HIV screen  04/18/1975    DTaP/Tdap/Td vaccine (1 - Tdap) 04/18/1979    Shingles Vaccine (1 of 2) 04/18/2010    Colon cancer screen colonoscopy  04/18/2010    Diabetic retinal exam  05/05/2016    Diabetic microalbuminuria test  04/12/2019    Diabetic foot exam  04/13/2019    Lipid screen  05/10/2019    Potassium monitoring  05/10/2019    Creatinine monitoring  05/10/2019    A1C test (Diabetic or Prediabetic)  07/07/2019    Flu vaccine (Season Ended) 09/01/2020    Hepatitis A vaccine  Aged Out    Hib vaccine  Aged Out    Meningococcal (ACWY) vaccine  Aged Out       There is no immunization history for the selected administration types on file for this patient. Review of Systems   Constitutional: Negative. HENT: Negative. Respiratory: Negative. Cardiovascular: Negative. Gastrointestinal: Negative. Musculoskeletal: Negative. All other systems reviewed and are negative. Objective:   Physical Exam  Vitals signs and nursing note reviewed. Constitutional:       Appearance: He is well-developed. HENT:      Head: Normocephalic and atraumatic. Right Ear: Tympanic membrane normal.      Left Ear: Tympanic membrane normal.   Neck:      Musculoskeletal: Neck supple. Vascular: No carotid bruit. Cardiovascular:      Rate and Rhythm: Normal rate and regular rhythm. Heart sounds: Normal heart sounds. No murmur. Pulmonary:      Effort: Pulmonary effort is normal.      Breath sounds: Normal breath sounds. Abdominal:      General: Bowel sounds are normal.      Palpations: Abdomen is soft. Skin:     General: Skin is warm and dry. Neurological:      Mental Status: He is alert and oriented to person, place, and time.    Psychiatric:         Behavior: Behavior normal. Assessment:       Diagnosis Orders   1. Chronic obstructive pulmonary disease, unspecified COPD type ([facility] [address]     2. Uncontrolled type 2 diabetes mellitus without complication, without long-term current use of insulin (HCC)  metFORMIN (GLUCOPHAGE) 500 MG tablet    Comprehensive Metabolic Panel    Hemoglobin A1C    Microalbumin / Creatinine Urine Ratio   3. Coronary artery disease involving native coronary artery of native heart without angina pectoris     4. Essential hypertension  CBC Auto Differential   5. Tobacco abuse     6. Pure hypercholesterolemia  Lipid Panel    Comprehensive Metabolic Panel    TSH with Reflex   7. S/P CABG x 2     8. Screening for prostate cancer  PSA, Prostatic Specific Antigen           Plan:      -  Chronic medical problems stable  -  Continue current medications  -  Follow up with specialists as scheduled, has appt with Dr. Patricio Sher in August   -  Check labs, will call  -  Pt declines immunizations, CCS  -  RTO annually at his request    Brook Mau received counseling on the following healthy behaviors: tobacco cessation    Patient given educational materials on Smoking Cessation    I have instructed Brook León to complete a self tracking handout on Smoking and instructed them to bring it with them to his next appointment. Discussed use, benefit, and side effects of prescribed medications. Barriers to medication compliance addressed. All patient questions answered. Pt voiced understanding.            Gertrudis Sood, DO

## 2020-10-29 ENCOUNTER — NURSE ONLY (OUTPATIENT)
Dept: LAB | Age: 60
End: 2020-10-29

## 2020-10-29 LAB
ALBUMIN SERPL-MCNC: 4.1 G/DL (ref 3.5–5.1)
ALP BLD-CCNC: 69 U/L (ref 38–126)
ALT SERPL-CCNC: 15 U/L (ref 11–66)
ANION GAP SERPL CALCULATED.3IONS-SCNC: 11 MEQ/L (ref 8–16)
AST SERPL-CCNC: 11 U/L (ref 5–40)
AVERAGE GLUCOSE: 267 MG/DL (ref 70–126)
BASOPHILS # BLD: 0.8 %
BASOPHILS ABSOLUTE: 0.1 THOU/MM3 (ref 0–0.1)
BILIRUB SERPL-MCNC: 0.5 MG/DL (ref 0.3–1.2)
BUN BLDV-MCNC: 16 MG/DL (ref 7–22)
CALCIUM SERPL-MCNC: 9.9 MG/DL (ref 8.5–10.5)
CHLORIDE BLD-SCNC: 100 MEQ/L (ref 98–111)
CHOLESTEROL, TOTAL: 154 MG/DL (ref 100–199)
CO2: 24 MEQ/L (ref 23–33)
CREAT SERPL-MCNC: 1 MG/DL (ref 0.4–1.2)
EOSINOPHIL # BLD: 1.9 %
EOSINOPHILS ABSOLUTE: 0.1 THOU/MM3 (ref 0–0.4)
ERYTHROCYTE [DISTWIDTH] IN BLOOD BY AUTOMATED COUNT: 13.4 % (ref 11.5–14.5)
ERYTHROCYTE [DISTWIDTH] IN BLOOD BY AUTOMATED COUNT: 49 FL (ref 35–45)
GFR SERPL CREATININE-BSD FRML MDRD: 76 ML/MIN/1.73M2
GLUCOSE BLD-MCNC: 280 MG/DL (ref 70–108)
HBA1C MFR BLD: 10.9 % (ref 4.4–6.4)
HCT VFR BLD CALC: 52 % (ref 42–52)
HDLC SERPL-MCNC: 43 MG/DL
HEMOGLOBIN: 17.2 GM/DL (ref 14–18)
IMMATURE GRANS (ABS): 0.01 THOU/MM3 (ref 0–0.07)
IMMATURE GRANULOCYTES: 0.2 %
LDL CHOLESTEROL CALCULATED: 86 MG/DL
LYMPHOCYTES # BLD: 34.8 %
LYMPHOCYTES ABSOLUTE: 2.2 THOU/MM3 (ref 1–4.8)
MCH RBC QN AUTO: 32.8 PG (ref 26–33)
MCHC RBC AUTO-ENTMCNC: 33.1 GM/DL (ref 32.2–35.5)
MCV RBC AUTO: 99 FL (ref 80–94)
MONOCYTES # BLD: 9.7 %
MONOCYTES ABSOLUTE: 0.6 THOU/MM3 (ref 0.4–1.3)
NUCLEATED RED BLOOD CELLS: 0 /100 WBC
PLATELET # BLD: 203 THOU/MM3 (ref 130–400)
PMV BLD AUTO: 9.9 FL (ref 9.4–12.4)
POTASSIUM SERPL-SCNC: 4.1 MEQ/L (ref 3.5–5.2)
PROSTATE SPECIFIC ANTIGEN: 0.78 NG/ML (ref 0–1)
RBC # BLD: 5.25 MILL/MM3 (ref 4.7–6.1)
SEG NEUTROPHILS: 52.6 %
SEGMENTED NEUTROPHILS ABSOLUTE COUNT: 3.4 THOU/MM3 (ref 1.8–7.7)
SODIUM BLD-SCNC: 135 MEQ/L (ref 135–145)
TOTAL PROTEIN: 7.6 G/DL (ref 6.1–8)
TRIGL SERPL-MCNC: 124 MG/DL (ref 0–199)
TSH SERPL DL<=0.05 MIU/L-ACNC: 1.67 UIU/ML (ref 0.4–4.2)
WBC # BLD: 6.4 THOU/MM3 (ref 4.8–10.8)

## 2020-11-02 ENCOUNTER — OFFICE VISIT (OUTPATIENT)
Dept: FAMILY MEDICINE CLINIC | Age: 60
End: 2020-11-02
Payer: COMMERCIAL

## 2020-11-02 VITALS
BODY MASS INDEX: 27.82 KG/M2 | RESPIRATION RATE: 16 BRPM | HEART RATE: 68 BPM | DIASTOLIC BLOOD PRESSURE: 64 MMHG | WEIGHT: 193.9 LBS | SYSTOLIC BLOOD PRESSURE: 124 MMHG | TEMPERATURE: 96 F

## 2020-11-02 PROCEDURE — 4004F PT TOBACCO SCREEN RCVD TLK: CPT | Performed by: FAMILY MEDICINE

## 2020-11-02 PROCEDURE — G8926 SPIRO NO PERF OR DOC: HCPCS | Performed by: FAMILY MEDICINE

## 2020-11-02 PROCEDURE — 3017F COLORECTAL CA SCREEN DOC REV: CPT | Performed by: FAMILY MEDICINE

## 2020-11-02 PROCEDURE — 3023F SPIROM DOC REV: CPT | Performed by: FAMILY MEDICINE

## 2020-11-02 PROCEDURE — G8427 DOCREV CUR MEDS BY ELIG CLIN: HCPCS | Performed by: FAMILY MEDICINE

## 2020-11-02 PROCEDURE — G8484 FLU IMMUNIZE NO ADMIN: HCPCS | Performed by: FAMILY MEDICINE

## 2020-11-02 PROCEDURE — 2022F DILAT RTA XM EVC RTNOPTHY: CPT | Performed by: FAMILY MEDICINE

## 2020-11-02 PROCEDURE — G8419 CALC BMI OUT NRM PARAM NOF/U: HCPCS | Performed by: FAMILY MEDICINE

## 2020-11-02 PROCEDURE — 99214 OFFICE O/P EST MOD 30 MIN: CPT | Performed by: FAMILY MEDICINE

## 2020-11-02 PROCEDURE — 3046F HEMOGLOBIN A1C LEVEL >9.0%: CPT | Performed by: FAMILY MEDICINE

## 2020-11-02 RX ORDER — EMPAGLIFLOZIN AND METFORMIN HYDROCHLORIDE 5; 500 MG/1; MG/1
TABLET ORAL
Qty: 60 TABLET | Refills: 2 | Status: SHIPPED | OUTPATIENT
Start: 2020-11-02 | End: 2020-11-03

## 2020-11-02 SDOH — ECONOMIC STABILITY: INCOME INSECURITY: HOW HARD IS IT FOR YOU TO PAY FOR THE VERY BASICS LIKE FOOD, HOUSING, MEDICAL CARE, AND HEATING?: NOT HARD AT ALL

## 2020-11-02 SDOH — ECONOMIC STABILITY: FOOD INSECURITY: WITHIN THE PAST 12 MONTHS, THE FOOD YOU BOUGHT JUST DIDN'T LAST AND YOU DIDN'T HAVE MONEY TO GET MORE.: NEVER TRUE

## 2020-11-02 SDOH — ECONOMIC STABILITY: TRANSPORTATION INSECURITY
IN THE PAST 12 MONTHS, HAS LACK OF TRANSPORTATION KEPT YOU FROM MEETINGS, WORK, OR FROM GETTING THINGS NEEDED FOR DAILY LIVING?: NO

## 2020-11-02 SDOH — ECONOMIC STABILITY: TRANSPORTATION INSECURITY
IN THE PAST 12 MONTHS, HAS THE LACK OF TRANSPORTATION KEPT YOU FROM MEDICAL APPOINTMENTS OR FROM GETTING MEDICATIONS?: NO

## 2020-11-02 SDOH — ECONOMIC STABILITY: FOOD INSECURITY: WITHIN THE PAST 12 MONTHS, YOU WORRIED THAT YOUR FOOD WOULD RUN OUT BEFORE YOU GOT MONEY TO BUY MORE.: NEVER TRUE

## 2020-11-02 ASSESSMENT — ENCOUNTER SYMPTOMS
RESPIRATORY NEGATIVE: 1
GASTROINTESTINAL NEGATIVE: 1

## 2020-11-02 NOTE — PROGRESS NOTES
Subjective:      Patient ID: Mague Godwin is a 61 y.o. male. HPI:    Chief Complaint   Patient presents with    Follow-up    Results    Diabetes    Hypertension     6 month eval.      Doing ok overall. Sugars terrible. Has been out of metformin for 6 mos. Lab Results   Component Value Date    LABA1C 10.9 (H) 10/29/2020    LABA1C 7.9 (H) 07/07/2018    LABA1C 11.5 (H) 04/12/2018     Lab Results   Component Value Date    GLUF 142 (H) 01/05/2017    LABMICR < 1.20 04/12/2018    LDLCALC 86 10/29/2020    CREATININE 1.0 10/29/2020     BP controlled. BP Readings from Last 3 Encounters:   11/02/20 124/64   04/24/20 130/74   07/12/18 118/62     Weight is down 13 lbs, has been more active. Wt Readings from Last 3 Encounters:   11/02/20 193 lb 14.4 oz (88 kg)   04/24/20 206 lb (93.4 kg)   07/12/18 205 lb 14.4 oz (93.4 kg)     Hx of COPD, he is still smoking. Hx of CAD, has not seen Dr. Lisa Chowdhury in 2 yrs. Has appt later this month. Patient Active Problem List   Diagnosis    COPD (chronic obstructive pulmonary disease) ([facility] [address]    CAD (coronary artery disease)    Post PTCA with MI    Hyperlipidemia    HTN (hypertension)    Tobacco abuse    Polyp of colon    Chest pain syndrome    Abnormal nuclear stress test    Diabetes mellitus type II, uncontrolled ([facility] [address]    S/P CABG x 2    Uncontrolled type 2 diabetes mellitus without complication, without long-term current use of insulin    Mild left ventricular systolic dysfunction     Past Surgical History:   Procedure Laterality Date    APPENDECTOMY      CARDIAC CATHETERIZATION  9/2/11    C[facility]    COLONOSCOPY      CORONARY ANGIOPLASTY WITH STENT PLACEMENT  3/12/10    [facility]    CORONARY ARTERY BYPASS GRAFT  2014    [facility]    VASCULAR SURGERY       Prior to Admission medications    Medication Sig Start Date End Date Taking?  Authorizing Provider   metFORMIN (GLUCOPHAGE) 500 MG tablet TAKE 1 TABLET BY MOUTH TWO TIMES A DAY WITH MEALS 4/24/20  Yes Yessica Chapin, DO   metoprolol succinate (TOPROL XL) 50 MG extended release tablet Take 50 mg by mouth every morning   Yes Historical Provider, MD   glucose blood VI test strips (FREESTYLE LITE) strip TEST twice a day 5/1/18  Yes Yessica Chapin, DO   FREESTYLE LANCETS MISC TEST twice a day 7/1/16  Yes Yessica Chapin DO   losartan (COZAAR) 25 MG tablet Take 1 tablet by mouth daily 2/4/16  Yes Vee Garcia MD   atorvastatin (LIPITOR) 80 MG tablet Take 1 tablet by mouth nightly 8/19/15  Yes Yessica Chapin DO   clopidogrel (PLAVIX) 75 MG tablet Take 1 tablet by mouth daily 5/5/15  Yes Yessica Chapin, DO   furosemide (LASIX) 40 MG tablet Take 1 tablet by mouth daily 5/5/15  Yes Yessica Chapin DO   NITROGLYCERIN PO Place  under the tongue as needed. Yes Historical Provider, MD   aspirin 325 MG EC tablet Take 325 mg by mouth daily.      Yes Historical Provider, MD       Lab Results   Component Value Date    LABA1C 10.9 (H) 10/29/2020     Lab Results   Component Value Date     01/05/2017       No components found for: CHLPL  Lab Results   Component Value Date    TRIG 124 10/29/2020    TRIG 72 05/10/2018    TRIG 154 03/30/2018     Lab Results   Component Value Date    HDL 43 10/29/2020    HDL 48 05/10/2018    HDL 45 03/30/2018     Lab Results   Component Value Date    LDLCALC 86 10/29/2020    LDLCALC 83 05/10/2018    LDLCALC 85 03/30/2018     No results found for: LABVLDL      Chemistry        Component Value Date/Time     10/29/2020 0613    K 4.1 10/29/2020 0613    K 5.1 05/10/2018 1110     10/29/2020 0613    CO2 24 10/29/2020 0613    BUN 16 10/29/2020 0613    CREATININE 1.0 10/29/2020 0613        Component Value Date/Time    CALCIUM 9.9 10/29/2020 0613    ALKPHOS 69 10/29/2020 0613    AST 11 10/29/2020 0613    ALT 15 10/29/2020 0613    BILITOT 0.5 10/29/2020 0613            Lab Results   Component Value Date    TSH 1.670 10/29/2020 Lab Results   Component Value Date    WBC 6.4 10/29/2020    HGB 17.2 10/29/2020    HCT 52.0 10/29/2020    MCV 99.0 (H) 10/29/2020     10/29/2020         Health Maintenance   Topic Date Due    Hepatitis C screen  1960    Pneumococcal 0-64 years Vaccine (1 of 1 - PPSV23) 04/18/1966    HIV screen  04/18/1975    DTaP/Tdap/Td vaccine (1 - Tdap) 04/18/1979    Shingles Vaccine (1 of 2) 04/18/2010    Colon cancer screen colonoscopy  04/18/2010    Low dose CT lung screening  04/18/2015    Diabetic retinal exam  05/05/2016    Diabetic microalbuminuria test  04/12/2019    Diabetic foot exam  04/13/2019    Flu vaccine (1) 11/02/2021 (Originally 9/1/2020)    A1C test (Diabetic or Prediabetic)  01/29/2021    Lipid screen  10/29/2021    Potassium monitoring  10/29/2021    Creatinine monitoring  10/29/2021    Hepatitis A vaccine  Aged Out    Hib vaccine  Aged Out    Meningococcal (ACWY) vaccine  Aged Out       There is no immunization history for the selected administration types on file for this patient. Review of Systems   Constitutional: Negative. HENT: Negative. Respiratory: Negative. Cardiovascular: Negative. Gastrointestinal: Negative. Musculoskeletal: Negative. All other systems reviewed and are negative. Objective:   Physical Exam  Vitals signs and nursing note reviewed. Constitutional:       General: He is not in acute distress. Appearance: Normal appearance. He is well-developed. HENT:      Head: Normocephalic and atraumatic. Right Ear: Tympanic membrane normal.      Left Ear: Tympanic membrane normal.   Eyes:      Conjunctiva/sclera: Conjunctivae normal.   Neck:      Musculoskeletal: Neck supple. Cardiovascular:      Rate and Rhythm: Normal rate and regular rhythm. Heart sounds: Normal heart sounds. No murmur. Pulmonary:      Effort: Pulmonary effort is normal.      Breath sounds: Normal breath sounds. No wheezing, rhonchi or rales. Abdominal:      General: There is no distension. Skin:     General: Skin is warm and dry. Findings: No rash (on exposed surfaces). Neurological:      General: No focal deficit present. Mental Status: He is alert. Psychiatric:         Attention and Perception: Attention normal.         Mood and Affect: Mood normal.         Speech: Speech normal.         Behavior: Behavior normal. Behavior is cooperative. Thought Content: Thought content normal.         Judgment: Judgment normal.         Assessment:       Diagnosis Orders   1. Uncontrolled type 2 diabetes mellitus, without long-term current use of insulin ([facility])   DIABETES FOOT EXAM    Empagliflozin-metFORMIN HCl (SYNJARDY) 5-500 MG TABS    Hemoglobin U1S    Basic Metabolic Panel   2. Essential hypertension     3. Pure hypercholesterolemia     4. Coronary artery disease involving native coronary artery of native heart without angina pectoris     5. Chronic obstructive pulmonary disease, unspecified [facility] ([facility]             Plan:      -  Chronic medical problems stable, DM poorly controlled (out of metformin for 6 mos)  -  Continue current medications, start Synjardy  -  Follow up with specialists as scheduled, appt with Matthew later this month  -  Declines all other preventive care at this time  -  Check labs 3 mos  -  RTO 3 mos    Marco Brush received counseling on the following healthy behaviors: tobacco cessation    Patient given educational materials on Smoking Cessation and Nutrition    I have instructed Marco Brush to complete a self tracking handout on Smoking and instructed them to bring it with them to his next appointment. Discussed use, benefit, and side effects of prescribed medications. Barriers to medication compliance addressed. All patient questions answered. Pt voiced understanding.              Anthem Keep, DO

## 2020-11-02 NOTE — PATIENT INSTRUCTIONS
You may receive a survey about your visit with us today. The feedback from our patients helps us identify what is working well and where the service to all patients can be enhanced. Thank you! Tobacco Cessation Programs     Telephonic behavior modification  Rashel Pérez ([phone])   Counseling service for those who are ready to quit using tobacco.     Available for uninsured [place] residents, [place] recipients, pregnant women, or patients whose health plans or employers are members of the [address] [place] [address] behavior modification   http://[place]. Quitlogix. org   Online support program to help patients through each step of the quitting process. Available 24 hours a day 7 days a week. Provides up to date researched based tool, step-by-step guides, and motivational messages. Online behavior modification   www.lungusa.org/stop-smoking/how-to-quit   HelpLine: [facility] (017-4719)   Email questions to:  Micaela@Options Away. imbookin ([place])    Website offers resources to help tobacco users figure out their reasons for quitting and then take the big step of quitting for good. Hypnosis   Location: [facility], [employer], [place]   Contact: Hardeep Romero, PhD at [phone]   Hypnosis for tobacco cessation   Cost $225 for the initial session and $175 for each session afterwards. Most patients require 6-8 sessions. There is the option to submit through the patients insurance. Hypnosis and behavior modification   Location: [facility],  [facility] [address] [employer]   Contact: Derek Kaufman, PhD at [phone]  [facility] See Counseling and hypnosis for nicotine addition   Cost: For uninsured patients:  Please call above phone number  Cost for insured patients depends on patients insurance plan.     Behavior modification   Location: [facility], [address] [place]A [place] [employer], [address]: [place] [address] include four one-on-one appointments between the patient and a respiratory therapist.  The four appointments span over three weeks. The respiratory therapist schedules one of the appointments to occur 48 hours after the patients quit date.  Cost $100 total for the four sessions. Tobacco cessation products are not included in the cost and are not provided by [facility].             Patient Education        Learning About Meal Planning for Diabetes  Why plan your meals? Meal planning can be a key part of managing diabetes. Planning meals and snacks with the right balance of carbohydrate, protein, and fat can help you keep your blood sugar at the target level you set with your doctor. You don't have to eat special foods. You can eat what your family eats, including sweets once in a while. But you do have to pay attention to how often you eat and how much you eat of certain foods. You may want to work with a dietitian or a certified diabetes educator. He or she can give you tips and meal ideas and can answer your questions about meal planning. This health professional can also help you reach a healthy weight if that is one of your goals. What plan is right for you? Your dietitian or diabetes educator may suggest that you start with the plate format or carbohydrate counting. The plate format  The plate format is a simple way to help you manage how you eat. You plan meals by learning how much space each food should take on a plate. Using the plate format helps you spread carbohydrate throughout the day. It can make it easier to keep your blood sugar level within your target range. It also helps you see if you're eating healthy portion sizes. To use the plate format, you put non-starchy vegetables on half your plate. Add meat or meat substitutes on one-quarter of the plate. Put a grain or starchy vegetable (such as brown rice or a potato) on the final quarter of the plate.  You can add a small piece of fruit and some low-fat or fat-free milk or yogurt, depending on your carbohydrate goal for each meal.  Here are some tips for using the plate format:  · Make sure that you are not using an oversized plate. A 9-inch plate is best. Many restaurants use larger plates. · Get used to using the plate format at home. Then you can use it when you eat out. · Write down your questions about using the plate format. Talk to your doctor, a dietitian, or a diabetes educator about your concerns. Carbohydrate counting  With carbohydrate counting, you plan meals based on the amount of carbohydrate in each food. Carbohydrate raises blood sugar higher and more quickly than any other nutrient. It is found in desserts, breads and cereals, and fruit. It's also found in starchy vegetables such as potatoes and corn, grains such as rice and pasta, and milk and yogurt. Spreading carbohydrate throughout the day helps keep your blood sugar levels within your target range. Your daily amount depends on several things, including your weight, how active you are, which diabetes medicines you take, and what your goals are for your blood sugar levels. A registered dietitian or diabetes educator can help you plan how much carbohydrate to include in each meal and snack. A guideline for your daily amount of carbohydrate is:  · 45 to 60 grams at each meal. That's about the same as 3 to 4 carbohydrate servings. · 15 to 20 grams at each snack. That's about the same as 1 carbohydrate serving. The Nutrition Facts label on packaged foods tells you how much carbohydrate is in a serving of the food. First, look at the serving size on the food label. Is that the amount you eat in a serving? All of the nutrition information on a food label is based on that serving size. So if you eat more or less than that, you'll need to adjust the other numbers. Total carbohydrate is the next thing you need to look for on the label. If you count carbohydrate servings, one serving of carbohydrate is 15 grams.   For foods that don't come with labels, such as fresh fruits and vegetables, you'll need a guide that lists carbohydrate in these foods. Ask your doctor, dietitian, or diabetes educator about books or other nutrition guides you can use. If you take insulin, you need to know how many grams of carbohydrate are in a meal. This lets you know how much rapid-acting insulin to take before you eat. If you use an insulin pump, you get a constant rate of insulin during the day. So the pump must be programmed at meals to give you extra insulin to cover the rise in blood sugar after meals. When you know how much carbohydrate you will eat, you can take the right amount of insulin. Or, if you always use the same amount of insulin, you need to make sure that you eat the same amount of carbohydrate at meals. If you need more help to understand carbohydrate counting and food labels, ask your doctor, dietitian, or diabetes educator. How do you get started with meal planning? Here are some tips to get started:  · Plan your meals a week at a time. Don't forget to include snacks too. · Use cookbooks or online recipes to plan several main meals. Plan some quick meals for busy nights. You also can double some recipes that freeze well. Then you can save half for other busy nights when you don't have time to cook. · Make sure you have the ingredients you need for your recipes. If you're running low on basic items, put these items on your shopping list too. · List foods that you use to make breakfasts, lunches, and snacks. List plenty of fruits and vegetables. · Post this list on the refrigerator. Add to it as you think of more things you need. · Take the list to the store to do your weekly shopping. Follow-up care is a key part of your treatment and safety. Be sure to make and go to all appointments, and call your doctor if you are having problems. It's also a good idea to know your test results and keep a list of the medicines you take.   Where can you learn more? Go to https://chpepiceweb.[employer]. org and sign in to your [employer] account. Enter H476 in the [employer] box to learn more about \"Learning About Meal Planning for Diabetes. \"     If you do not have an account, please click on the \"Sign Up Now\" link. Current as of: December 20, 2019               Content Version: 12.6  © 6244-5664 [employer]. Care instructions adapted under license by [facility] ([facility]). If you have questions about a medical condition or this instruction, always ask your healthcare professional. [place] [address] any warranty or liability for your use of this information. Patient Education        High Blood Pressure: Care Instructions  Overview     It's normal for blood pressure to go up and down throughout the day. But if it stays up, you have high blood pressure. Another name for high blood pressure is hypertension. Despite what a lot of people think, high blood pressure usually doesn't cause headaches or make you feel dizzy or lightheaded. It usually has no symptoms. But it does increase your risk of stroke, heart attack, and other problems. You and your doctor will talk about your risks of these problems based on your blood pressure. Your doctor will give you a goal for your blood pressure. Your goal will be based on your health and your age. Lifestyle changes, such as eating healthy and being active, are always important to help lower blood pressure. You might also take medicine to reach your blood pressure goal.  Follow-up care is a key part of your treatment and safety. Be sure to make and go to all appointments, and call your doctor if you are having problems. It's also a good idea to know your test results and keep a list of the medicines you take. How can you care for yourself at home? Medical treatment  · If you stop taking your medicine, your blood pressure will go back up.  You may take one or more types of medicine to lower your blood pressure. Be safe with medicines. Take your medicine exactly as prescribed. Call your doctor if you think you are having a problem with your medicine. · Talk to your doctor before you start taking aspirin every day. Aspirin can help certain people lower their risk of a heart attack or stroke. But taking aspirin isn't right for everyone, because it can cause serious bleeding. · See your doctor regularly. You may need to see the doctor more often at first or until your blood pressure comes down. · If you are taking blood pressure medicine, talk to your doctor before you take decongestants or anti-inflammatory medicine, such as ibuprofen. Some of these medicines can raise blood pressure. · Learn how to check your blood pressure at home. Lifestyle changes  · Stay at a healthy weight. This is especially important if you put on weight around the waist. Losing even 10 pounds can help you lower your blood pressure. · If your doctor recommends it, get more exercise. Walking is a good choice. Bit by bit, increase the amount you walk every day. Try for at least 30 minutes on most days of the week. You also may want to swim, bike, or do other activities. · Avoid or limit alcohol. Talk to your doctor about whether you can drink any alcohol. · Try to limit how much sodium you eat to less than 2,300 milligrams (mg) a day. Your doctor may ask you to try to eat less than 1,500 mg a day. · Eat plenty of fruits (such as bananas and oranges), vegetables, legumes, whole grains, and low-fat dairy products. · Lower the amount of saturated fat in your diet. Saturated fat is found in animal products such as milk, cheese, and meat. Limiting these foods may help you lose weight and also lower your risk for heart disease. · Do not smoke. Smoking increases your risk for heart attack and stroke. If you need help quitting, talk to your doctor about stop-smoking programs and medicines.  These can increase your chances of quitting for good. When should you call for help? Call  911 anytime you think you may need emergency care. This may mean having symptoms that suggest that your blood pressure is causing a serious heart or blood vessel problem. Your blood pressure may be over 180/120. For example, call 911 if:    · You have symptoms of a heart attack. These may include:  ? Chest pain or pressure, or a strange feeling in the chest.  ? Sweating. ? Shortness of breath. ? Nausea or vomiting. ? Pain, pressure, or a strange feeling in the back, neck, jaw, or upper belly or in one or both shoulders or arms. ? Lightheadedness or sudden weakness. ? A fast or irregular heartbeat.     · You have symptoms of a stroke. These may include:  ? Sudden numbness, tingling, weakness, or loss of movement in your face, arm, or leg, especially on only one side of your body. ? Sudden vision changes. ? Sudden trouble speaking. ? Sudden confusion or trouble understanding simple statements. ? Sudden problems with walking or balance. ? A sudden, severe headache that is different from past headaches.     · You have severe back or belly pain. Do not wait until your blood pressure comes down on its own. Get help right away. Call your doctor now or seek immediate care if:    · Your blood pressure is much higher than normal (such as 180/120 or higher), but you don't have symptoms.     · You think high blood pressure is causing symptoms, such as:  ? Severe headache.  ? Blurry vision. Watch closely for changes in your health, and be sure to contact your doctor if:    · Your blood pressure measures higher than your doctor recommends at least 2 times. That means the top number is higher or the bottom number is higher, or both.     · You think you may be having side effects from your blood pressure medicine. Where can you learn more? Go to https://miriam.RentNegotiator.com. org and sign in to your [employer] account.  Enter E166 in the

## 2020-11-02 NOTE — PROGRESS NOTES
Chronic Disease Visit Information    BP Readings from Last 3 Encounters:   11/02/20 124/64   04/24/20 130/74   07/12/18 118/62          Hemoglobin A1C (%)   Date Value   10/29/2020 10.9 (H)   07/07/2018 7.9 (H)   04/12/2018 11.5 (H)     Microalbumin, Random Urine (mg/dL)   Date Value   04/12/2018 < 1.20     LDL Calculated (mg/dL)   Date Value   10/29/2020 86     HDL (mg/dL)   Date Value   10/29/2020 43     BUN (mg/dL)   Date Value   10/29/2020 16     CREATININE (mg/dL)   Date Value   10/29/2020 1.0     Glucose (mg/dL)   Date Value   10/29/2020 280 (H)   11/18/2015 139 (H)            Have you changed or started any medications since your last visit including any over-the-counter medicines, vitamins, or herbal medicines? no   Are you having any side effects from any of your medications? -  no  Have you stopped taking any of your medications? Is so, why? -  no    Have you seen any other physician or provider since your last visit? No  Have you had any other diagnostic tests since your last visit? Yes - Records Obtained  Have you been seen in the emergency room and/or had an admission to a hospital since we last saw you? No  Have you had your annual diabetic retinal (eye) exam? No  Have you had your routine dental cleaning in the past 6 months? no    Have you activated your [employer] account? If not, what are your barriers?  Yes     Patient Care Team:  Spencer Session, DO as PCP - General (Family Medicine)  Spencer Session, DO as PCP - [facility] Provider         Medical History Review  Past Medical, Family, and Social History reviewed and does contribute to the patient presenting condition    Health Maintenance   Topic Date Due    Hepatitis C screen  1960    Pneumococcal 0-64 years Vaccine (1 of 1 - PPSV23) 04/18/1966    HIV screen  04/18/1975    DTaP/Tdap/Td vaccine (1 - Tdap) 04/18/1979    Shingles Vaccine (1 of 2) 04/18/2010    Colon cancer screen colonoscopy  04/18/2010    Low dose CT lung screening  04/18/2015    Diabetic retinal exam  05/05/2016    Diabetic microalbuminuria test  04/12/2019    Diabetic foot exam  04/13/2019    Flu vaccine (1) 09/01/2020    A1C test (Diabetic or Prediabetic)  01/29/2021    Lipid screen  10/29/2021    Potassium monitoring  10/29/2021    Creatinine monitoring  10/29/2021    Hepatitis A vaccine  Aged Out    Hib vaccine  Aged Out    Meningococcal (ACWY) vaccine  Aged Out

## 2020-11-03 ENCOUNTER — TELEPHONE (OUTPATIENT)
Dept: FAMILY MEDICINE CLINIC | Age: 60
End: 2020-11-03

## 2020-11-03 NOTE — TELEPHONE ENCOUNTER
Pt called office stating you switched him from Metformin to Synjardy yesterday. The new medication would cost him over $400. He would like to go back to Metformin, please send to [facility]. Pt is aware this will be addressed tomorrow morning, if he does not hear back from us by 11am he will check with the pharmacy. Please advise.

## 2020-11-22 ENCOUNTER — PATIENT MESSAGE (OUTPATIENT)
Dept: FAMILY MEDICINE CLINIC | Age: 60
End: 2020-11-22

## 2021-01-04 ENCOUNTER — OFFICE VISIT (OUTPATIENT)
Dept: CARDIOLOGY CLINIC | Age: 61
End: 2021-01-04
Payer: COMMERCIAL

## 2021-01-04 VITALS
SYSTOLIC BLOOD PRESSURE: 105 MMHG | WEIGHT: 196.2 LBS | HEIGHT: 71 IN | HEART RATE: 79 BPM | BODY MASS INDEX: 27.47 KG/M2 | DIASTOLIC BLOOD PRESSURE: 68 MMHG

## 2021-01-04 DIAGNOSIS — I25.83 CORONARY ARTERY DISEASE DUE TO LIPID RICH PLAQUE: Primary | ICD-10-CM

## 2021-01-04 DIAGNOSIS — I25.10 CORONARY ARTERY DISEASE DUE TO LIPID RICH PLAQUE: Primary | ICD-10-CM

## 2021-01-04 DIAGNOSIS — R06.02 SOB (SHORTNESS OF BREATH): ICD-10-CM

## 2021-01-04 PROCEDURE — 3017F COLORECTAL CA SCREEN DOC REV: CPT | Performed by: INTERNAL MEDICINE

## 2021-01-04 PROCEDURE — 99204 OFFICE O/P NEW MOD 45 MIN: CPT | Performed by: INTERNAL MEDICINE

## 2021-01-04 PROCEDURE — G8484 FLU IMMUNIZE NO ADMIN: HCPCS | Performed by: INTERNAL MEDICINE

## 2021-01-04 PROCEDURE — 4004F PT TOBACCO SCREEN RCVD TLK: CPT | Performed by: INTERNAL MEDICINE

## 2021-01-04 PROCEDURE — G8419 CALC BMI OUT NRM PARAM NOF/U: HCPCS | Performed by: INTERNAL MEDICINE

## 2021-01-04 PROCEDURE — G8427 DOCREV CUR MEDS BY ELIG CLIN: HCPCS | Performed by: INTERNAL MEDICINE

## 2021-01-04 PROCEDURE — 93000 ELECTROCARDIOGRAM COMPLETE: CPT | Performed by: INTERNAL MEDICINE

## 2021-01-04 RX ORDER — ASPIRIN 81 MG/1
325 TABLET ORAL DAILY
Qty: 30 TABLET | Refills: 3 | Status: SHIPPED | OUTPATIENT
Start: 2021-01-04 | End: 2021-12-03

## 2021-01-04 NOTE — PROGRESS NOTES
[address] [facility] [place] [address] [facility] [address]  Dept: [phone]  Dept Fax: [phone]  Loc: [phone]    Visit Date: 1/4/2021    Mr. Donis Salcedo is a 61 y.o. male  who presented for:  Chief Complaint   Patient presents with    New Patient     establish cardiologist; former patient of Dr. Cricket Mckeon       HPI:   60 yo M c hx of MI, CAD s/p CABG (LIMA to LAD, SVG to RCA 6/23/14), COPD, DM, HLD, NONI, smoker is here to establish cardiology. Patient states he is doing well, he is switching from Dr. Cricket Mckeon due to insurance issues. Denies any chest pain, sob, palpitations, lightheadedness, dizziness, orthopnea, PND or pedal edema. Continues to smoke      Current Outpatient Medications:     aspirin 81 MG EC tablet, Take 4 tablets by mouth daily, Disp: 30 tablet, Rfl: 3    metFORMIN (GLUCOPHAGE) 500 MG tablet, Take 1 tablet by mouth 2 times daily (with meals), Disp: 60 tablet, Rfl: 2    metoprolol succinate (TOPROL XL) 50 MG extended release tablet, Take 50 mg by mouth every morning, Disp: , Rfl:     glucose blood VI test strips (FREESTYLE LITE) strip, TEST twice a day, Disp: 100 strip, Rfl: 3    FREESTYLE LANCETS MISC, TEST twice a day, Disp: 100 each, Rfl: 3    losartan (COZAAR) 25 MG tablet, Take 1 tablet by mouth daily, Disp: 30 tablet, Rfl: 3    atorvastatin (LIPITOR) 80 MG tablet, Take 1 tablet by mouth nightly, Disp: 90 tablet, Rfl: 3    clopidogrel (PLAVIX) 75 MG tablet, Take 1 tablet by mouth daily, Disp: 90 tablet, Rfl: 3    furosemide (LASIX) 40 MG tablet, Take 1 tablet by mouth daily, Disp: 90 tablet, Rfl: 3    NITROGLYCERIN PO, Place  under the tongue as needed. , Disp: , Rfl:     Past Medical History  Becky Epstein  has a past medical history of CAD (coronary artery disease), CHF (congestive heart failure) ([address] COPD (chronic obstructive pulmonary disease) ([address] Diabetes mellitus ([facility] Hyperlipidemia, PONV (postoperative nausea and vomiting), and Sleep apnea. Social History  Frankie Joel  reports that he has been smoking cigarettes. He has a 32.00 pack-year smoking history. He has never used smokeless tobacco. He reports that he does not drink alcohol or use drugs. Family History  Frankie Joel family history includes Diabetes in his paternal cousin; Heart Disease in his brother, brother, and father; High Blood Pressure in his mother and sister. Past Surgical History   Past Surgical History:   Procedure Laterality Date    APPENDECTOMY      CARDIAC CATHETERIZATION  9/2/11    [facility]    COLONOSCOPY      CORONARY ANGIOPLASTY WITH STENT PLACEMENT  3/12/10    [facility]    CORONARY ARTERY BYPASS GRAFT  2014    [facility]    VASCULAR SURGERY         Subjective:     REVIEW OF SYSTEMS  Constitutional: denies sweats, chills and fever  HENT: denies  congestion, sinus pressure, sneezing and sore throat. Eyes: denies  pain, discharge, redness and itching. Respiratory: denies apnea, cough  Gastrointestinal: denies blood in stool, constipation, diarrhea   Endocrine: denies cold intolerance, heat intolerance, polydipsia. Genitourinary: denies dysuria, enuresis, flank pain and hematuria. Musculoskeletal: denies arthralgias, joint swelling and neck pain. Neurological: denies numbness and headaches. Psychiatric/Behavioral: denies agitation, confusion, decreased concentration and dysphoric mood    All others reviewed and are negative. Objective:     /68   Pulse 79   Ht 5' 10.75\" (1.797 m)   Wt 196 lb 3.2 oz (89 kg)   BMI 27.56 kg/m²     Wt Readings from Last 3 Encounters:   01/04/21 196 lb 3.2 oz (89 kg)   11/02/20 193 lb 14.4 oz (88 kg)   04/24/20 206 lb (93.4 kg)     BP Readings from Last 3 Encounters:   01/04/21 105/68   11/02/20 124/64   04/24/20 130/74       PHYSICAL EXAM  Constitutional: Oriented to person, place, and time. Appears well-developed and well-nourished.    HENT:   Head: Normocephalic and atraumatic. Eyes: EOM are normal. Pupils are equal, round, and reactive to light. Neck: Normal range of motion. Neck supple. No JVD present. Cardiovascular: Normal rate , normal heart sounds and intact distal pulses. Pulmonary/Chest: Effort normal and breath sounds normal. No respiratory distress. No wheezes. No rales. Abdominal: Soft. Bowel sounds are normal. No distension. There is no tenderness. Musculoskeletal: Normal range of motion. No edema. Neurological: Alert and oriented to person, place, and time. No cranial nerve deficit. Coordination normal.   Skin: Skin is warm and dry. Psychiatric: Normal mood and affect.        Lab Results   Component Value Date    CKTOTAL 564 07/18/2011    CKMB 24.1 07/18/2011       Lab Results   Component Value Date    WBC 6.4 10/29/2020    RBC 5.25 10/29/2020    RBC 4.19 09/02/2011    HGB 17.2 10/29/2020    HCT 52.0 10/29/2020    MCV 99.0 10/29/2020    MCH 32.8 10/29/2020    MCHC 33.1 10/29/2020    RDW 14.6 05/10/2018     10/29/2020    MPV 9.9 10/29/2020       Lab Results   Component Value Date     10/29/2020    K 4.1 10/29/2020    K 5.1 05/10/2018     10/29/2020    CO2 24 10/29/2020    BUN 16 10/29/2020    LABALBU 4.1 10/29/2020    LABALBU 4.0 09/02/2011    CREATININE 1.0 10/29/2020    CALCIUM 9.9 10/29/2020    LABGLOM 76 10/29/2020    GLUCOSE 280 10/29/2020    GLUCOSE 139 11/18/2015       Lab Results   Component Value Date    ALKPHOS 69 10/29/2020    ALT 15 10/29/2020    AST 11 10/29/2020    PROT 7.6 10/29/2020    BILITOT 0.5 10/29/2020    BILIDIR <0.2 03/30/2018    LABALBU 4.1 10/29/2020    LABALBU 4.0 09/02/2011       Lab Results   Component Value Date    MG 2.5 06/24/2014       Lab Results   Component Value Date    INR 0.91 06/19/2014         Lab Results   Component Value Date    LABA1C 10.9 10/29/2020       Lab Results   Component Value Date    TRIG 124 10/29/2020    HDL 43 10/29/2020    LDLCALC 86 10/29/2020    LDLDIRECT 103 10/31/2015       Lab Results   Component Value Date    TSH 1.670 10/29/2020         Testing Reviewed:      I haveindividually reviewed the below cardiac tests    EKG:    ECHO: No results found for this or any previous visit. STRESS:    CATH:    Assessment/Plan       Diagnosis Orders   1. Coronary artery disease due to lipid rich plaque     2. SOB (shortness of breath)  EKG 12 lead     CAD s/p CABG  DM (HbA1c: 10.9)  COPD  HLD   NONI   Smoker    Continue Aspirin, plavix, statin  EF on cath in 5/2018 is 45-50%  Continues to smoke  Will reduce aspirin 325mg to 81 mg  Needs DM to be management better  Continues rest of the management  No claudication  The patient is asked to make an attempt to improve diet and exercise patterns to aid in medical management of this problem. Advised more plant based nutrition/meditarrean diet   Advised patient to call office or seek immediate medical attention if there is any new onset of  any chest pain, sob, palpitations, lightheadedness, dizziness, orthopnea, PND or pedal edema. All medication side effects were discussed in details. Thank youfor allowing me to participate in the care of this patient. Please do not hesitate to contact me for any further questions. Return in about 6 months (around 7/4/2021) for Regular follow up, Review testing.        Electronically signed by Suzie Donohue MD [facility]  1/4/2021 at 9:57 AM EST

## 2021-02-03 RX ORDER — CLOPIDOGREL BISULFATE 75 MG/1
75 TABLET ORAL DAILY
Qty: 90 TABLET | Refills: 3 | Status: SHIPPED | OUTPATIENT
Start: 2021-02-03 | End: 2022-02-07 | Stop reason: SDUPTHER

## 2021-05-26 NOTE — TELEPHONE ENCOUNTER
----- Message from Dolly Alston sent at 5/25/2021  4:28 PM EDT -----  Subject: Refill Request    QUESTIONS  Name of Medication? metFORMIN (GLUCOPHAGE) 500 MG tablet  Patient-reported dosage and instructions? 500 MG once daily   How many days do you have left? 0  Preferred Pharmacy? [facility]  Pharmacy phone number (if available)? [phone]  ---------------------------------------------------------------------------  --------------  CALL BACK INFO  What is the best way for the office to contact you? OK to leave message on   voicemail  Preferred Call Back Phone Number?  [phone]

## 2021-11-16 ENCOUNTER — OFFICE VISIT (OUTPATIENT)
Dept: FAMILY MEDICINE CLINIC | Age: 61
End: 2021-11-16
Payer: COMMERCIAL

## 2021-11-16 VITALS
SYSTOLIC BLOOD PRESSURE: 130 MMHG | DIASTOLIC BLOOD PRESSURE: 78 MMHG | OXYGEN SATURATION: 98 % | BODY MASS INDEX: 26.67 KG/M2 | RESPIRATION RATE: 18 BRPM | HEART RATE: 67 BPM | WEIGHT: 189.9 LBS

## 2021-11-16 DIAGNOSIS — I25.10 CORONARY ARTERY DISEASE INVOLVING NATIVE CORONARY ARTERY OF NATIVE HEART WITHOUT ANGINA PECTORIS: ICD-10-CM

## 2021-11-16 DIAGNOSIS — I10 ESSENTIAL HYPERTENSION: ICD-10-CM

## 2021-11-16 DIAGNOSIS — E78.00 PURE HYPERCHOLESTEROLEMIA: ICD-10-CM

## 2021-11-16 DIAGNOSIS — Z95.1 S/P CABG X 2: ICD-10-CM

## 2021-11-16 DIAGNOSIS — Z72.0 TOBACCO ABUSE: ICD-10-CM

## 2021-11-16 DIAGNOSIS — Z12.5 SCREENING FOR PROSTATE CANCER: ICD-10-CM

## 2021-11-16 DIAGNOSIS — J44.9 CHRONIC OBSTRUCTIVE PULMONARY DISEASE, UNSPECIFIED COPD TYPE (HCC): ICD-10-CM

## 2021-11-16 PROCEDURE — G8427 DOCREV CUR MEDS BY ELIG CLIN: HCPCS | Performed by: FAMILY MEDICINE

## 2021-11-16 PROCEDURE — 3023F SPIROM DOC REV: CPT | Performed by: FAMILY MEDICINE

## 2021-11-16 PROCEDURE — 2022F DILAT RTA XM EVC RTNOPTHY: CPT | Performed by: FAMILY MEDICINE

## 2021-11-16 PROCEDURE — G8926 SPIRO NO PERF OR DOC: HCPCS | Performed by: FAMILY MEDICINE

## 2021-11-16 PROCEDURE — 99214 OFFICE O/P EST MOD 30 MIN: CPT | Performed by: FAMILY MEDICINE

## 2021-11-16 PROCEDURE — 4004F PT TOBACCO SCREEN RCVD TLK: CPT | Performed by: FAMILY MEDICINE

## 2021-11-16 PROCEDURE — G8484 FLU IMMUNIZE NO ADMIN: HCPCS | Performed by: FAMILY MEDICINE

## 2021-11-16 PROCEDURE — 3017F COLORECTAL CA SCREEN DOC REV: CPT | Performed by: FAMILY MEDICINE

## 2021-11-16 PROCEDURE — G8419 CALC BMI OUT NRM PARAM NOF/U: HCPCS | Performed by: FAMILY MEDICINE

## 2021-11-16 PROCEDURE — 3046F HEMOGLOBIN A1C LEVEL >9.0%: CPT | Performed by: FAMILY MEDICINE

## 2021-11-16 SDOH — ECONOMIC STABILITY: FOOD INSECURITY: WITHIN THE PAST 12 MONTHS, YOU WORRIED THAT YOUR FOOD WOULD RUN OUT BEFORE YOU GOT MONEY TO BUY MORE.: NEVER TRUE

## 2021-11-16 SDOH — ECONOMIC STABILITY: FOOD INSECURITY: WITHIN THE PAST 12 MONTHS, THE FOOD YOU BOUGHT JUST DIDN'T LAST AND YOU DIDN'T HAVE MONEY TO GET MORE.: NEVER TRUE

## 2021-11-16 ASSESSMENT — PATIENT HEALTH QUESTIONNAIRE - PHQ9
SUM OF ALL RESPONSES TO PHQ QUESTIONS 1-9: 0
SUM OF ALL RESPONSES TO PHQ QUESTIONS 1-9: 0
1. LITTLE INTEREST OR PLEASURE IN DOING THINGS: 0
SUM OF ALL RESPONSES TO PHQ QUESTIONS 1-9: 0
SUM OF ALL RESPONSES TO PHQ9 QUESTIONS 1 & 2: 0
2. FEELING DOWN, DEPRESSED OR HOPELESS: 0

## 2021-11-16 ASSESSMENT — SOCIAL DETERMINANTS OF HEALTH (SDOH): HOW HARD IS IT FOR YOU TO PAY FOR THE VERY BASICS LIKE FOOD, HOUSING, MEDICAL CARE, AND HEATING?: NOT HARD AT ALL

## 2021-11-16 ASSESSMENT — ENCOUNTER SYMPTOMS
RESPIRATORY NEGATIVE: 1
GASTROINTESTINAL NEGATIVE: 1

## 2021-11-16 NOTE — PROGRESS NOTES
Medications    Medication Sig Taking? Authorizing Provider   metFORMIN (GLUCOPHAGE) 500 MG tablet Take 1 tablet by mouth 2 times daily (with meals) Yes Trudee Cabot, DO   clopidogrel (PLAVIX) 75 MG tablet Take 1 tablet by mouth daily Yes KENDRA Leal CNP   aspirin 81 MG EC tablet Take 4 tablets by mouth daily Yes Linus Delgado MD   glucose blood VI test strips (FREESTYLE LITE) strip TEST twice a day Yes Trudee Cabot, DO   FREESTYLE LANCETS MISC TEST twice a day Yes Trudee Cabot, DO   atorvastatin (LIPITOR) 80 MG tablet Take 1 tablet by mouth nightly Yes Trudee Cabot, DO   NITROGLYCERIN PO Place  under the tongue as needed.  Yes Historical Provider, MD   metoprolol succinate (TOPROL XL) 50 MG extended release tablet Take 50 mg by mouth every morning  Patient not taking: Reported on 11/16/2021  Historical Provider, MD   losartan (COZAAR) 25 MG tablet Take 1 tablet by mouth daily  Patient not taking: Reported on 11/16/2021  Damian Pascual MD   furosemide (LASIX) 40 MG tablet Take 1 tablet by mouth daily  Patient not taking: Reported on 11/16/2021  Trudee Cabot, DO        No Known Allergies    Past Medical History:   Diagnosis Date    CAD (coronary artery disease)     [facility]     COPD (chronic obstructive pulmonary disease) [facility]     Diabetes mellitus [facility]     Hyperlipidemia     PONV (postoperative nausea and vomiting)     Sleep apnea     has CPAP doesn't wear       Past Surgical History:   Procedure Laterality Date    APPENDECTOMY      CARDIAC CATHETERIZATION  9/2/11    [facility]/14    [facility]    COLONOSCOPY      CORONARY ANGIOPLAST[facility] WITH STENT PLACEMENT  3/12/10    [facility]    CORONARY ARTERY BYPASS GRAFT  2014    [facility]    VASCULAR SURGERY         Social History     Socioeconomic History    Marital status:      Spouse name: El Rowe Number of children: 2    Years of education: 12    Highest education level: High school graduate   Occupational History    Not on file   Tobacco Use    Smoking status: Current Every Day Smoker     Packs/day: 1.00     Years: 32.00     Pack years: 32.00     Types: Cigarettes    Smokeless tobacco: Never Used   Substance and Sexual Activity    Alcohol use: No     Alcohol/week: 0.0 standard drinks    Drug use: No    Sexual activity: Yes     Partners: Female   Other Topics Concern    Not on file   Social History Narrative    Not on file     Social Determinants of Health     Financial Resource Strain: Low Risk     Difficulty of Paying Living Expenses: Not hard at all   Food Insecurity: No Food Insecurity    Worried About Running Out of Food in the Last Year: Never true    [place] of Food in the Last Year: Never true   Transportation Needs:     Lack of Transportation (Medical): Not on file    Lack of Transportation (Non-Medical):  Not on file   Physical Activity:     Days of Exercise per Week: Not on file    Minutes of Exercise per Session: Not on file   Stress:     Feeling of Stress : Not on file   Social Connections:     Frequency of Communication with Friends and Family: Not on file    Frequency of Social Gatherings with Friends and Family: Not on file    Attends [employer] Services: Not on file    Active Member of [address] [place] [address] [employer] or Organizations: Not on file    Attends Club or Organization Meetings: Not on file    Marital Status: Not on file   Intimate Partner Violence:     Fear of Current or Ex-Partner: Not on file    Emotionally Abused: Not on file    Physically Abused: Not on file    Sexually Abused: Not on file   Housing Stability:     Unable to Pay for Housing in the Last Year: Not on file    Number of [place] in the Last Year: Not on file    Unstable Housing in the Last Year: Not on file        Family History   Problem Relation Age of Onset    High Blood Pressure Mother     Heart Disease Father     High Blood Pressure Sister     Heart Disease Brother     Heart Disease Brother     Diabetes Paternal Cousin     Cancer Neg Hx     Stroke Neg Hx        ADVANCE DIRECTIVE: N, <no information>    Vitals:    11/16/21 0804   BP: 130/78   Pulse: 67   Resp: 18   SpO2: 98%   Weight: 189 lb 14.4 oz (86.1 kg)     Estimated body mass index is 26.67 kg/m² as calculated from the following:    Height as of 1/4/21: 5' 10.75\" (1.797 m). Weight as of this encounter: 189 lb 14.4 oz (86.1 kg). Physical Exam  Vitals and nursing note reviewed. Constitutional:       General: He is not in acute distress. Appearance: Normal appearance. He is well-developed. HENT:      Head: Normocephalic and atraumatic. Right Ear: Tympanic membrane normal.      Left Ear: Tympanic membrane normal.   Eyes:      Conjunctiva/sclera: Conjunctivae normal.   Cardiovascular:      Rate and Rhythm: Normal rate and regular rhythm. Heart sounds: Normal heart sounds. No murmur heard. Pulmonary:      Effort: Pulmonary effort is normal.      Breath sounds: Normal breath sounds. No wheezing, rhonchi or rales. Abdominal:      General: There is no distension. Musculoskeletal:      Cervical back: Neck supple. Skin:     General: Skin is warm and dry. Findings: No rash (on exposed surfaces). Neurological:      General: No focal deficit present. Mental Status: He is alert. Psychiatric:         Attention and Perception: Attention normal.         Mood and Affect: Mood normal.         Speech: Speech normal.         Behavior: Behavior normal. Behavior is cooperative. Thought Content: Thought content normal.         Judgment: Judgment normal.         No flowsheet data found.     Lab Results   Component Value Date    CHOL 154 10/29/2020    CHOL 145 05/10/2018    CHOL 161 03/30/2018    TRIG 124 10/29/2020    TRIG 72 05/10/2018    TRIG 154 03/30/2018    HDL 43 10/29/2020    HDL 48 05/10/2018    HDL 45 03/30/2018    LDLCALC 86 10/29/2020    LDLCALC 83 05/10/2018 [address] 03/30/2018    GLUF 142 01/05/2017    GLUCOSE 280 10/29/2020    GLUCOSE 139 11/18/2015    LABA1C 10.9 10/29/2020    LABA1C 7.9 07/07/2018    LABA1C 11.5 04/12/2018       The 10-year ASCVD risk score (Saray De La Garza, et al., 2013) is: 27.3%    Values used to calculate the score:      Age: 64 years      Sex: Male      Is Non- : No      Diabetic: Yes      Tobacco smoker: Yes      Systolic Blood Pressure: 577 mmHg      Is BP treated: Yes      HDL Cholesterol: 43 mg/dL      Total Cholesterol: 154 mg/dL    There is no immunization history for the selected administration types on file for this patient. Health Maintenance   Topic Date Due    Hepatitis C screen  Never done    Pneumococcal 0-64 years Vaccine (1 of 2 - PPSV23) Never done    COVID-19 Vaccine (1) Never done    HIV screen  Never done    DTaP/Tdap/Td vaccine (1 - Tdap) Never done    Colon cancer screen colonoscopy  Never done    Shingles Vaccine (1 of 2) Never done    Low dose CT lung screening  Never done    Diabetic retinal exam  05/05/2016    Diabetic microalbuminuria test  04/12/2019    Flu vaccine (1) Never done    A1C test (Diabetic or Prediabetic)  10/29/2021    Potassium monitoring  10/29/2021    Creatinine monitoring  10/29/2021    Diabetic foot exam  11/02/2021    Lipid screen  10/29/2021    Hepatitis A vaccine  Aged Out    Hib vaccine  Aged Out    Meningococcal (ACWY) vaccine  Aged Out          ASSESSMENT/PLAN:  1. Uncontrolled type 2 diabetes mellitus, without long-term current use of insulin (HCC)  -     Comprehensive Metabolic Panel; Future  -     Hemoglobin A1C; Future  -     Microalbumin / Creatinine Urine Ratio; Future  2. Coronary artery disease involving native coronary artery of native heart without angina pectoris  -     CBC Auto Differential; Future  3. Essential hypertension  -     CBC Auto Differential; Future  4.  Pure hypercholesterolemia  -     Lipid Panel w/ Reflex Direct LDL; Future  -     Comprehensive Metabolic Panel; Future  -     TSH with Reflex; Future  5. Chronic obstructive pulmonary disease, unspecified COPD type ([address]  6. Tobacco abuse  7. S/P CABG x 2  8. Screening for prostate cancer  -     PSA screening; Future    -  Pt non-compliant with medications and recommendations  -  Check labs above and go from there  -  Would like to restart Synjardy if sugars high  -  Must quit smoking  -  Follow up with Dr. Gabriel Limon for long-term disability needs  -  Declines all other preventive care at this time    Return in about 1 year (around 11/16/2022) for DM2. An electronic signature was used to authenticate this note.     --Deshawn Santos,  on 11/16/2021 at 8:48 AM

## 2021-11-17 ENCOUNTER — TELEPHONE (OUTPATIENT)
Dept: CARDIOLOGY CLINIC | Age: 61
End: 2021-11-17

## 2021-11-17 NOTE — TELEPHONE ENCOUNTER
Spoke with pt. Appt scheduled. Informed pt that we do not do long term disability paperwork. PCP will have to fill these out.

## 2021-11-29 ENCOUNTER — PATIENT MESSAGE (OUTPATIENT)
Dept: FAMILY MEDICINE CLINIC | Age: 61
End: 2021-11-29

## 2021-11-29 ENCOUNTER — NURSE ONLY (OUTPATIENT)
Dept: LAB | Age: 61
End: 2021-11-29

## 2021-11-29 DIAGNOSIS — Z12.5 SCREENING FOR PROSTATE CANCER: ICD-10-CM

## 2021-11-29 DIAGNOSIS — I10 ESSENTIAL HYPERTENSION: ICD-10-CM

## 2021-11-29 DIAGNOSIS — E78.00 PURE HYPERCHOLESTEROLEMIA: ICD-10-CM

## 2021-11-29 DIAGNOSIS — I25.10 CORONARY ARTERY DISEASE INVOLVING NATIVE CORONARY ARTERY OF NATIVE HEART WITHOUT ANGINA PECTORIS: ICD-10-CM

## 2021-11-29 LAB
ALBUMIN SERPL-MCNC: 4.4 G/DL (ref 3.5–5.1)
ALP BLD-CCNC: 71 U/L (ref 38–126)
ALT SERPL-CCNC: 11 U/L (ref 11–66)
ANION GAP SERPL CALCULATED.3IONS-SCNC: 12 MEQ/L (ref 8–16)
AST SERPL-CCNC: 11 U/L (ref 5–40)
AVERAGE GLUCOSE: 225 MG/DL (ref 70–126)
BASOPHILS # BLD: 1 %
BASOPHILS ABSOLUTE: 0.1 THOU/MM3 (ref 0–0.1)
BILIRUB SERPL-MCNC: 0.4 MG/DL (ref 0.3–1.2)
BUN BLDV-MCNC: 17 MG/DL (ref 7–22)
CALCIUM SERPL-MCNC: 9.7 MG/DL (ref 8.5–10.5)
CHLORIDE BLD-SCNC: 100 MEQ/L (ref 98–111)
CHOLESTEROL, TOTAL: 236 MG/DL (ref 100–199)
CO2: 23 MEQ/L (ref 23–33)
CREAT SERPL-MCNC: 1 MG/DL (ref 0.4–1.2)
CREATININE, URINE: 308.6 MG/DL
EOSINOPHIL # BLD: 1.8 %
EOSINOPHILS ABSOLUTE: 0.1 THOU/MM3 (ref 0–0.4)
ERYTHROCYTE [DISTWIDTH] IN BLOOD BY AUTOMATED COUNT: 12.8 % (ref 11.5–14.5)
ERYTHROCYTE [DISTWIDTH] IN BLOOD BY AUTOMATED COUNT: 46.9 FL (ref 35–45)
GFR SERPL CREATININE-BSD FRML MDRD: 76 ML/MIN/1.73M2
GLUCOSE BLD-MCNC: 238 MG/DL (ref 70–108)
HBA1C MFR BLD: 9.5 % (ref 4.4–6.4)
HCT VFR BLD CALC: 50 % (ref 42–52)
HDLC SERPL-MCNC: 46 MG/DL
HEMOGLOBIN: 16.6 GM/DL (ref 14–18)
IMMATURE GRANS (ABS): 0.02 THOU/MM3 (ref 0–0.07)
IMMATURE GRANULOCYTES: 0.3 %
LDL CHOLESTEROL CALCULATED: 163 MG/DL
LYMPHOCYTES # BLD: 23.8 %
LYMPHOCYTES ABSOLUTE: 1.7 THOU/MM3 (ref 1–4.8)
MCH RBC QN AUTO: 32.6 PG (ref 26–33)
MCHC RBC AUTO-ENTMCNC: 33.2 GM/DL (ref 32.2–35.5)
MCV RBC AUTO: 98.2 FL (ref 80–94)
MICROALBUMIN UR-MCNC: 17.8 MG/DL
MICROALBUMIN/CREAT UR-RTO: 58 MG/G (ref 0–30)
MONOCYTES # BLD: 8.5 %
MONOCYTES ABSOLUTE: 0.6 THOU/MM3 (ref 0.4–1.3)
NUCLEATED RED BLOOD CELLS: 0 /100 WBC
PLATELET # BLD: 257 THOU/MM3 (ref 130–400)
PMV BLD AUTO: 9.6 FL (ref 9.4–12.4)
POTASSIUM SERPL-SCNC: 4.4 MEQ/L (ref 3.5–5.2)
PROSTATE SPECIFIC ANTIGEN: 0.66 NG/ML (ref 0–1)
RBC # BLD: 5.09 MILL/MM3 (ref 4.7–6.1)
SEG NEUTROPHILS: 64.6 %
SEGMENTED NEUTROPHILS ABSOLUTE COUNT: 4.7 THOU/MM3 (ref 1.8–7.7)
SODIUM BLD-SCNC: 135 MEQ/L (ref 135–145)
TOTAL PROTEIN: 7.5 G/DL (ref 6.1–8)
TRIGL SERPL-MCNC: 135 MG/DL (ref 0–199)
TSH SERPL DL<=0.05 MIU/L-ACNC: 2.29 UIU/ML (ref 0.4–4.2)
WBC # BLD: 7.3 THOU/MM3 (ref 4.8–10.8)

## 2021-11-29 NOTE — TELEPHONE ENCOUNTER
From: Yue Flores  To: Dr. Morales Redo: 11/29/2021 8:00 AM EST  Subject: need refill    need refill of my metformin please send to [facility] on [address]    thanks

## 2021-12-03 ENCOUNTER — OFFICE VISIT (OUTPATIENT)
Dept: CARDIOLOGY CLINIC | Age: 61
End: 2021-12-03
Payer: COMMERCIAL

## 2021-12-03 VITALS
BODY MASS INDEX: 27.23 KG/M2 | HEIGHT: 71 IN | SYSTOLIC BLOOD PRESSURE: 134 MMHG | WEIGHT: 194.5 LBS | HEART RATE: 87 BPM | DIASTOLIC BLOOD PRESSURE: 85 MMHG

## 2021-12-03 DIAGNOSIS — I25.10 CORONARY ARTERY DISEASE DUE TO LIPID RICH PLAQUE: Primary | ICD-10-CM

## 2021-12-03 DIAGNOSIS — I25.83 CORONARY ARTERY DISEASE DUE TO LIPID RICH PLAQUE: Primary | ICD-10-CM

## 2021-12-03 PROCEDURE — 4004F PT TOBACCO SCREEN RCVD TLK: CPT | Performed by: INTERNAL MEDICINE

## 2021-12-03 PROCEDURE — G8427 DOCREV CUR MEDS BY ELIG CLIN: HCPCS | Performed by: INTERNAL MEDICINE

## 2021-12-03 PROCEDURE — 3017F COLORECTAL CA SCREEN DOC REV: CPT | Performed by: INTERNAL MEDICINE

## 2021-12-03 PROCEDURE — 99213 OFFICE O/P EST LOW 20 MIN: CPT | Performed by: INTERNAL MEDICINE

## 2021-12-03 PROCEDURE — G8484 FLU IMMUNIZE NO ADMIN: HCPCS | Performed by: INTERNAL MEDICINE

## 2021-12-03 PROCEDURE — G8419 CALC BMI OUT NRM PARAM NOF/U: HCPCS | Performed by: INTERNAL MEDICINE

## 2021-12-03 RX ORDER — ASPIRIN 325 MG
325 TABLET ORAL DAILY
COMMUNITY

## 2021-12-03 NOTE — PROGRESS NOTES
[facility] [address] [place] [address]  Dept: [phone]  Dept Fax: [phone]  Loc: [phone]    Visit Date: 12/3/2021    Mr. Milton Ho is a 64 y.o. male  who presented for:  Chief Complaint   Patient presents with    Check-Up    Coronary Artery Disease       HPI:   65 yo M c hx of MI, CAD s/p CABG (LIMA to LAD, SVG to RCA 6/23/14), COPD, DM, HLD, NONI, smoker is here for a one-year follow up visit. EKG today shows NSR with no ST or T wave changes. Patient states he is doing well. SOB on exertion going up and down stairs. Feels heart racing sporadically. Lasts for 30 seconds or less. Resolves without intervention. Only taking aspirin, plavix, and metformin. Denies any chest pain, lightheadedness, dizziness, orthopnea, PND or pedal edema. Continues to smoke      Current Outpatient Medications:     aspirin 325 MG tablet, Take 325 mg by mouth daily, Disp: , Rfl:     metFORMIN (GLUCOPHAGE) 500 MG tablet, Take 1 tablet by mouth 2 times daily (with meals), Disp: 180 tablet, Rfl: 3    clopidogrel (PLAVIX) 75 MG tablet, Take 1 tablet by mouth daily, Disp: 90 tablet, Rfl: 3    glucose blood VI test strips (FREESTYLE LITE) strip, TEST twice a day, Disp: 100 strip, Rfl: 3    FREESTYLE LANCETS MISC, TEST twice a day, Disp: 100 each, Rfl: 3    atorvastatin (LIPITOR) 80 MG tablet, Take 1 tablet by mouth nightly, Disp: 90 tablet, Rfl: 3    metoprolol succinate (TOPROL XL) 50 MG extended release tablet, Take 50 mg by mouth every morning (Patient not taking: Reported on 11/16/2021), Disp: , Rfl:     losartan (COZAAR) 25 MG tablet, Take 1 tablet by mouth daily (Patient not taking: Reported on 11/16/2021), Disp: 30 tablet, Rfl: 3    furosemide (LASIX) 40 MG tablet, Take 1 tablet by mouth daily (Patient not taking: Reported on 11/16/2021), Disp: 90 tablet, Rfl: 3    NITROGLYCERIN PO, Place  under the tongue as needed. (Patient not taking: Reported on 12/3/2021), Disp: , Rfl:     Past Medical History  Malik Ricketts  has a past medical history of CAD (coronary artery disease), CHF (congestive heart failure) ([facility] [address] COPD (chronic obstructive pulmonary disease) ([facility] [address] Diabetes mellitus ([facility] Hyperlipidemia, PONV (postoperative nausea and vomiting), and Sleep apnea. Social History  Malik Ricketts  reports that he has been smoking cigarettes. He has a 32.00 pack-year smoking history. He has never used smokeless tobacco. He reports that he does not drink alcohol and does not use drugs. Family History  Malik Ricketts family history includes Diabetes in his paternal cousin; Heart Disease in his brother, brother, and father; High Blood Pressure in his mother and sister. Past Surgical History   Past Surgical History:   Procedure Laterality Date    APPENDECTOMY      CARDIAC CATHETERIZATION  9/2/11    C[facility]14    [facility]    COLONOSCOPY      CORONARY ANGIOPLASTY WITH STENT PLACEMENT  3/12/10    [facility]    CORONARY ARTERY BYPASS GRAFT  2014    [facility]    VASCULAR SURGERY         Subjective:     REVIEW OF SYSTEMS  Constitutional: denies sweats, chills and fever  HENT: denies  congestion, sinus pressure, sneezing and sore throat. Eyes: denies  pain, discharge, redness and itching. Respiratory: denies apnea, cough  Gastrointestinal: denies blood in stool, constipation, diarrhea   Endocrine: denies cold intolerance, heat intolerance, polydipsia. Genitourinary: denies dysuria, enuresis, flank pain and hematuria. Musculoskeletal: denies arthralgias, joint swelling and neck pain. Neurological: denies numbness and headaches. Psychiatric/Behavioral: denies agitation, confusion, decreased concentration and dysphoric mood    All others reviewed and are negative.    Objective:     /85   Pulse 87   Ht 5' 10.5\" (1.791 m)   Wt 194 lb 8 oz (88.2 kg)   BMI 27.51 kg/m²     Wt Readings from Last 3 Encounters:   12/03/21 194 lb 8 oz (88.2 kg)   11/16/21 189 lb 14.4 oz (86.1 kg)   01/04/21 196 lb 3.2 oz (89 kg)     BP Readings from Last 3 Encounters:   12/03/21 134/85   11/16/21 130/78   01/04/21 105/68       PHYSICAL EXAM  Constitutional: Oriented to person, place, and time. Appears well-developed and well-nourished. HENT:   Head: Normocephalic and atraumatic. Eyes: EOM are normal. Pupils are equal, round, and reactive to light. Neck: Normal range of motion. Neck supple. No JVD present. Cardiovascular: Normal rate , normal heart sounds and intact distal pulses. Pulmonary/Chest: Effort normal and breath sounds normal. No respiratory distress. No wheezes. No rales. Abdominal: Soft. Bowel sounds are normal. No distension. There is no tenderness. Musculoskeletal: Normal range of motion. No edema. Neurological: Alert and oriented to person, place, and time. No cranial nerve deficit. Coordination normal.   Skin: Skin is warm and dry. Psychiatric: Normal mood and affect.        Lab Results   Component Value Date    CKTOTAL 564 07/18/2011    CKMB 24.1 07/18/2011       Lab Results   Component Value Date    WBC 7.3 11/29/2021    RBC 5.09 11/29/2021    RBC 4.19 09/02/2011    HGB 16.6 11/29/2021    HCT 50.0 11/29/2021    MCV 98.2 11/29/2021    MCH 32.6 11/29/2021    MCHC 33.2 11/29/2021    RDW 14.6 05/10/2018     11/29/2021    MPV 9.6 11/29/2021       Lab Results   Component Value Date     11/29/2021    K 4.4 11/29/2021    K 5.1 05/10/2018     11/29/2021    CO2 23 11/29/2021    BUN 17 11/29/2021    LABALBU 4.4 11/29/2021    LABALBU 4.0 09/02/2011    CREATININE 1.0 11/29/2021    CALCIUM 9.7 11/29/2021    LABGLOM 76 11/29/2021    GLUCOSE 238 11/29/2021    GLUCOSE 139 11/18/2015       Lab Results   Component Value Date    ALKPHOS 71 11/29/2021    ALT 11 11/29/2021    AST 11 11/29/2021    PROT 7.5 11/29/2021    BILITOT 0.4 11/29/2021    BILIDIR <0.2 03/30/2018    LABALBU 4.4 11/29/2021    LABALBU 4.0 09/02/2011       Lab Results   Component Value Date    MG 2.5 06/24/2014       Lab Results   Component Value Date    INR 0.91 06/19/2014         Lab Results   Component Value Date    LABA1C 9.5 11/29/2021       Lab Results   Component Value Date    TRIG 135 11/29/2021    HDL 46 11/29/2021    LDLCALC 163 11/29/2021    LDLDIRECT 103 10/31/2015       Lab Results   Component Value Date    TSH 2.290 11/29/2021         Testing Reviewed:      I have individually reviewed the below cardiac tests    EKG:    ECHO: No results found for this or any previous visit. STRESS:    CATH:    Assessment/Plan       CAD s/p CABG  DM (HbA1c: 10.9)  COPD  HLD   NONI   Smoker    Continue Aspirin, plavix,   Patient no longer taking statin, toprol, losartan  EF on cath in 5/2018 is 45-50%  Continues to smoke  On Aspirin 81 mg  Elevated cholesterol and LDL  Needs DM to be management better  Continues rest of the management  No claudication  The patient is asked to make an attempt to improve diet and exercise patterns to aid in medical management of this problem. Advised patient to call office or seek immediate medical attention if there is any new onset of  any chest pain, sob, palpitations, lightheadedness, dizziness, orthopnea, PND or pedal edema. All medication side effects were discussed in details. Thank you for allowing me to participate in the care of this patient. Please do not hesitate to contact me for any further questions. Return in about 1 year (around 12/3/2022), or if symptoms worsen or fail to improve, for Review testing, Regular follow up.        Electronically signed by Michel Tom MD [facility]  12/3/2021 at 9:57 AM EST

## 2021-12-03 NOTE — PROGRESS NOTES
Pt here for 1 yr check up      Pt states is taking metformin , asa, and plavix not taking other meds at this time      Pt continues with heart palpitations, describes as heart racing,

## 2021-12-06 ENCOUNTER — OFFICE VISIT (OUTPATIENT)
Dept: FAMILY MEDICINE CLINIC | Age: 61
End: 2021-12-06
Payer: COMMERCIAL

## 2021-12-06 VITALS
SYSTOLIC BLOOD PRESSURE: 132 MMHG | RESPIRATION RATE: 16 BRPM | BODY MASS INDEX: 27.64 KG/M2 | HEART RATE: 76 BPM | WEIGHT: 195.4 LBS | DIASTOLIC BLOOD PRESSURE: 76 MMHG

## 2021-12-06 DIAGNOSIS — J44.9 CHRONIC OBSTRUCTIVE PULMONARY DISEASE, UNSPECIFIED COPD TYPE (HCC): ICD-10-CM

## 2021-12-06 DIAGNOSIS — I10 ESSENTIAL HYPERTENSION: ICD-10-CM

## 2021-12-06 DIAGNOSIS — I25.10 CORONARY ARTERY DISEASE INVOLVING NATIVE CORONARY ARTERY OF NATIVE HEART WITHOUT ANGINA PECTORIS: ICD-10-CM

## 2021-12-06 DIAGNOSIS — Z95.1 S/P CABG X 2: ICD-10-CM

## 2021-12-06 DIAGNOSIS — E78.00 PURE HYPERCHOLESTEROLEMIA: ICD-10-CM

## 2021-12-06 DIAGNOSIS — R80.9 MICROALBUMINURIA: ICD-10-CM

## 2021-12-06 DIAGNOSIS — Z72.0 TOBACCO ABUSE: ICD-10-CM

## 2021-12-06 PROCEDURE — 99214 OFFICE O/P EST MOD 30 MIN: CPT | Performed by: FAMILY MEDICINE

## 2021-12-06 PROCEDURE — G8419 CALC BMI OUT NRM PARAM NOF/U: HCPCS | Performed by: FAMILY MEDICINE

## 2021-12-06 PROCEDURE — 4004F PT TOBACCO SCREEN RCVD TLK: CPT | Performed by: FAMILY MEDICINE

## 2021-12-06 PROCEDURE — 3023F SPIROM DOC REV: CPT | Performed by: FAMILY MEDICINE

## 2021-12-06 PROCEDURE — 3046F HEMOGLOBIN A1C LEVEL >9.0%: CPT | Performed by: FAMILY MEDICINE

## 2021-12-06 PROCEDURE — G8484 FLU IMMUNIZE NO ADMIN: HCPCS | Performed by: FAMILY MEDICINE

## 2021-12-06 PROCEDURE — 3017F COLORECTAL CA SCREEN DOC REV: CPT | Performed by: FAMILY MEDICINE

## 2021-12-06 PROCEDURE — G8427 DOCREV CUR MEDS BY ELIG CLIN: HCPCS | Performed by: FAMILY MEDICINE

## 2021-12-06 PROCEDURE — 2022F DILAT RTA XM EVC RTNOPTHY: CPT | Performed by: FAMILY MEDICINE

## 2021-12-06 PROCEDURE — G8926 SPIRO NO PERF OR DOC: HCPCS | Performed by: FAMILY MEDICINE

## 2021-12-06 RX ORDER — ATORVASTATIN CALCIUM 80 MG/1
80 TABLET, FILM COATED ORAL NIGHTLY
Qty: 90 TABLET | Refills: 3 | Status: SHIPPED | OUTPATIENT
Start: 2021-12-06 | End: 2022-07-11 | Stop reason: SDUPTHER

## 2021-12-06 RX ORDER — GLIMEPIRIDE 2 MG/1
2 TABLET ORAL
Qty: 90 TABLET | Refills: 3 | Status: SHIPPED | OUTPATIENT
Start: 2021-12-06 | End: 2022-03-21

## 2021-12-06 RX ORDER — LOSARTAN POTASSIUM 25 MG/1
25 TABLET ORAL DAILY
Qty: 90 TABLET | Refills: 3 | Status: SHIPPED | OUTPATIENT
Start: 2021-12-06 | End: 2022-07-22

## 2021-12-06 ASSESSMENT — PATIENT HEALTH QUESTIONNAIRE - PHQ9
SUM OF ALL RESPONSES TO PHQ QUESTIONS 1-9: 0
SUM OF ALL RESPONSES TO PHQ9 QUESTIONS 1 & 2: 0
1. LITTLE INTEREST OR PLEASURE IN DOING THINGS: 0
SUM OF ALL RESPONSES TO PHQ QUESTIONS 1-9: 0
SUM OF ALL RESPONSES TO PHQ QUESTIONS 1-9: 0
2. FEELING DOWN, DEPRESSED OR HOPELESS: 0

## 2021-12-06 ASSESSMENT — ENCOUNTER SYMPTOMS
RESPIRATORY NEGATIVE: 1
GASTROINTESTINAL NEGATIVE: 1

## 2021-12-06 NOTE — PROGRESS NOTES
Michael Flores (:  1960) is a 64 y.o. male,Established patient, here for evaluation of the following chief complaint(s):  Follow-up and Results        Subjective   SUBJECTIVE/OBJECTIVE:  HPI:    Chief Complaint   Patient presents with    Follow-up    Results     Pt presents today to review labs. Sugars improved but still not to goal.  Compliant with metformin. Lab Results   Component Value Date    LABA1C 9.5 (H) 2021    LABA1C 10.9 (H) 10/29/2020    LABA1C 7.9 (H) 2018     Lab Results   Component Value Date    GLUF 142 (H) 2017    LABMICR 17.80 2021    LDLCALC 163 2021    CREATININE 1.0 2021     BP Readings from Last 3 Encounters:   21 132/76   21 134/85   21 130/78     LDL elevated, admittedly not taking Lipitor. Patient Active Problem List   Diagnosis    COPD [facility]chronic obstructive [facility] disease) ([facility] [facility]    CAD (coronary artery disease)    Post PTCA with MI    Hyperlipidemia    HTN (hypertension)    Tobacco abuse    Polyp of colon    Chest pain syndrome    Abnormal nuclear stress test    Diabetes mellitus type II, uncontrolled ([address]    S/P CABG x 2    Uncontrolled type 2 diabetes mellitus, without long-term current use of insulin ([address]    Mild left ventricular systolic dysfunction     Past Surgical History:   Procedure Laterality Date    APPENDECTOMY      CARDIAC CATHETERIZATION  11    [facility]ATION  14    [facility]    COLONOSCOPY      CORONARY ANGIOPLASTY WITH STENT PLACEMENT  3/12/10    [facility]    CORONARY ARTERY BYPASS GRAFT      [facility]    VASCULAR SURGERY       Social History     Tobacco Use    Smoking status: Current Every Day Smoker     Packs/day: 1.00     Years: 32.00     Pack years: 32.00     Types: Cigarettes    Smokeless tobacco: Never Used   Substance Use Topics    Alcohol use: No     Alcohol/week: 0.0 standard drinks    Drug use: No         Review of Systems   Constitutional: Negative. HENT: Negative. Respiratory: Negative. Cardiovascular: Negative. Gastrointestinal: Negative. Musculoskeletal: Negative. All other systems reviewed and are negative. Objective   Physical Exam  Vitals and nursing note reviewed. Constitutional:       General: He is not in acute distress. Appearance: Normal appearance. He is well-developed. HENT:      Head: Normocephalic and atraumatic. Right Ear: Tympanic membrane normal.      Left Ear: Tympanic membrane normal.   Eyes:      Conjunctiva/sclera: Conjunctivae normal.   Cardiovascular:      Rate and Rhythm: Normal rate and regular rhythm. Heart sounds: Normal heart sounds. No murmur heard. Pulmonary:      Effort: Pulmonary effort is normal.      Breath sounds: Normal breath sounds. No wheezing, rhonchi or rales. Abdominal:      General: There is no distension. Musculoskeletal:      Cervical back: Neck supple. Skin:     General: Skin is warm and dry. Findings: No rash (on exposed surfaces). Neurological:      General: No focal deficit present. Mental Status: He is alert. Psychiatric:         Attention and Perception: Attention normal.         Mood and Affect: Mood normal.         Speech: Speech normal.         Behavior: Behavior normal. Behavior is cooperative. Thought Content: Thought content normal.         Judgment: Judgment normal.               ASSESSMENT/PLAN:  1. Uncontrolled type 2 diabetes mellitus, without long-term current use of insulin ([facility])  -      DIABETES FOOT EXAM  -     glimepiride (AMARYL) 2 MG tablet; Take 1 tablet by mouth every morning (before breakfast), Disp-90 tablet, R-3Normal  -     Comprehensive Metabolic Panel; Future  -     Hemoglobin A1C; Future  2. Hyperlipidemia  -     Lipid Panel w/ Reflex Direct LDL; Future  3. Coronary artery disease involving native coronary artery of native heart without angina pectoris  4. Essential hypertension  5.  Pure hypercholesterolemia  -     atorvastatin (LIPITOR) 80 MG tablet; Take 1 tablet by mouth nightly, Disp-90 tablet, R-3Normal  6. Chronic obstructive pulmonary disease, unspecified COPD type ([facility] [address]  7. Tobacco abuse  8. S/P CABG x 2  9. Microalbuminuria  -     losartan (COZAAR) 25 MG tablet; Take 1 tablet by mouth daily, Disp-90 tablet, R-3Normal    -  Chronic medical problems stable  -  Continue current medications  -  Restart Lipitor and losartan  -  Start Amaryl  -  Recheck labs 3 mos    Return in about 3 months (around 3/6/2022) for DM2. An electronic signature was used to authenticate this note.     --Rochelle Clifford, DO

## 2021-12-06 NOTE — PATIENT INSTRUCTIONS
You may receive a survey about your visit with us today. The feedback from our patients helps us identify what is working well and where the service to all patients can be enhanced. Thank you! Tobacco Cessation Programs     Telephonic behavior modification  Leonides Bandar ([phone])   Counseling service for those who are ready to quit using tobacco.     Available for uninsured [place] residents, [place] recipients, pregnant women, or patients whose health plans or employers are members of the [address] [place] [address] behavior modification   http://[place]. Quitlogix. org   Online support program to help patients through each step of the quitting process. Available 24 hours a day 7 days a week. Provides up to date researched based tool, step-by-step guides, and motivational messages. Online behavior modification   www.lungusa.org/stop-smoking/how-to-quit   HelpLine: 1-800-LUNG-[place] (798-7491)   Email questions to:  Jen@[employer]. [employer]cast Ready    Website offers resources to help tobacco users figure out their reasons for quitting and then take the big step of quitting for good. Hypnosis   Location: [facility], [address], [place]   Contact: Sandrita Banegas, PhD at [phone]   Hypnosis for tobacco cessation   Cost $225 for the initial session and $175 for each session afterwards. Most patients require 6-8 sessions. There is the option to submit through the patients insurance. Hypnosis and behavior modification   Location: [place] [address],  [address]   Contact: Radha Ramirez, PhD at [phone]  [facility] Counseling and hypnosis for nicotine addition   Cost: For uninsured patients:  Please call above phone number  Cost for insured patients depends on patients insurance plan.     Behavior modification   Location: [facility], [address] [facility] [address] [facility] [address], [address]: [address] four one-on-one appointments between the patient and a respiratory therapist.  The four appointments span over three weeks. The respiratory therapist schedules one of the appointments to occur 48 hours after the patients quit date.  Cost $100 total for the four sessions.   Tobacco cessation products are not included in the cost and are not provided by [facility].

## 2021-12-28 ENCOUNTER — TELEPHONE (OUTPATIENT)
Dept: CARDIOLOGY CLINIC | Age: 61
End: 2021-12-28

## 2021-12-28 NOTE — LETTER
[address] [facility]  [facility].  [address] [place] [address]  Phone: [phone]  Fax: [phone]    Jg Garcia MD        December 29, 2021    [address]  [address] [address]      To Whom it May Concern: It it in my medical opinion, from a cardiology standpoint, that Laurie Vicente (4/18/2021) may return to work without restrictions. If you have any questions or concerns, please don't hesitate to call.     Sincerely,        Jg Garcia MD

## 2021-12-28 NOTE — LETTER
[address] [facility]  [facility].  [address]  [facility] [address]  Phone: [phone]  Fax: [phone]    Kim Medina MD        December 29, 2021    [address]  [address] [address]      To Whom it May Concern: It it in my medical opinion, from a cardiology standpoint, that Jeanne Rivera (4/18/2021) may return to work without restrictions. If you have any questions or concerns, please don't hesitate to call.     Sincerely,        Kim Medina MD

## 2021-12-28 NOTE — TELEPHONE ENCOUNTER
This pt changed Cardiologists from Dr Austin Hayes (d/t Dr Sudha Jensen being out of network). He was on disability from Dr Austin Hayes. He wants off of disability and PER's need paperwork signed stating he may work from cardiac standpoint. Advised to go through PCP but pt states he did and Dr Jolly Naidu did not want to sign anything from a cardiac standpoint. He will bring paperwork in so we can take a look at it. Pt's My Chart Message:    Jhonathan Samuels MD    KW      1:07 PM  could Dr Asif Grover fill out a return to work form from [employer] so I could return to the work force            Thanks for your time      Mary Becerra CMA  to [address]      2:00 PM  Does a physician currently have you off work? Last read by Chip Marie at 6:03 AM on 12/28/2021.   December 28, 2021    Jhonathan Samuels MD    KW      6:06 AM  here is my phone number  its easer to for me to explain over the phone   Thanks for your time

## 2021-12-29 NOTE — TELEPHONE ENCOUNTER
Reviewed with Dr Dia Rao. Per Dr Dia Rao: Pt is okay to go back to work from a cardiac standpoint with no restriction. Will generate letter for Dr Dia Rao to sign. Pt notified that we are unable to fill out paperwork as it is for PCP to fill out. Pt voiced understanding. Please agree.

## 2022-02-07 RX ORDER — CLOPIDOGREL BISULFATE 75 MG/1
75 TABLET ORAL DAILY
Qty: 90 TABLET | Refills: 3 | Status: CANCELLED | OUTPATIENT
Start: 2022-02-07

## 2022-02-08 RX ORDER — CLOPIDOGREL BISULFATE 75 MG/1
75 TABLET ORAL DAILY
Qty: 90 TABLET | Refills: 3 | Status: SHIPPED | OUTPATIENT
Start: 2022-02-08 | End: 2022-05-09 | Stop reason: SDUPTHER

## 2022-03-21 RX ORDER — EMPAGLIFLOZIN AND METFORMIN HYDROCHLORIDE 5; 1000 MG/1; MG/1
TABLET ORAL
Qty: 60 TABLET | Refills: 5 | Status: SHIPPED | OUTPATIENT
Start: 2022-03-21

## 2022-04-04 ENCOUNTER — PATIENT MESSAGE (OUTPATIENT)
Dept: CARDIOLOGY CLINIC | Age: 62
End: 2022-04-04

## 2022-05-10 RX ORDER — CLOPIDOGREL BISULFATE 75 MG/1
75 TABLET ORAL DAILY
Qty: 90 TABLET | Refills: 2 | Status: SHIPPED | OUTPATIENT
Start: 2022-05-10 | End: 2022-07-11 | Stop reason: SDUPTHER

## 2022-07-11 ENCOUNTER — OFFICE VISIT (OUTPATIENT)
Dept: CARDIOLOGY CLINIC | Age: 62
End: 2022-07-11

## 2022-07-11 VITALS
HEIGHT: 71 IN | HEART RATE: 82 BPM | RESPIRATION RATE: 16 BRPM | BODY MASS INDEX: 28.28 KG/M2 | WEIGHT: 202 LBS | SYSTOLIC BLOOD PRESSURE: 142 MMHG | DIASTOLIC BLOOD PRESSURE: 78 MMHG

## 2022-07-11 DIAGNOSIS — I25.83 CORONARY ARTERY DISEASE DUE TO LIPID RICH PLAQUE: Primary | ICD-10-CM

## 2022-07-11 DIAGNOSIS — I25.10 CORONARY ARTERY DISEASE DUE TO LIPID RICH PLAQUE: Primary | ICD-10-CM

## 2022-07-11 DIAGNOSIS — E78.00 PURE HYPERCHOLESTEROLEMIA: ICD-10-CM

## 2022-07-11 DIAGNOSIS — R06.02 SOB (SHORTNESS OF BREATH) ON EXERTION: ICD-10-CM

## 2022-07-11 PROCEDURE — 93000 ELECTROCARDIOGRAM COMPLETE: CPT | Performed by: INTERNAL MEDICINE

## 2022-07-11 PROCEDURE — 99203 OFFICE O/P NEW LOW 30 MIN: CPT | Performed by: INTERNAL MEDICINE

## 2022-07-11 RX ORDER — ATORVASTATIN CALCIUM 80 MG/1
80 TABLET, FILM COATED ORAL NIGHTLY
Qty: 90 TABLET | Refills: 3 | Status: SHIPPED | OUTPATIENT
Start: 2022-07-11 | End: 2022-10-05 | Stop reason: SDUPTHER

## 2022-07-11 RX ORDER — METOPROLOL SUCCINATE 50 MG/1
25 TABLET, EXTENDED RELEASE ORAL EVERY MORNING
Qty: 90 TABLET | Refills: 3 | Status: SHIPPED | OUTPATIENT
Start: 2022-07-11 | End: 2022-10-05 | Stop reason: SDUPTHER

## 2022-07-11 RX ORDER — CLOPIDOGREL BISULFATE 75 MG/1
75 TABLET ORAL DAILY
Qty: 90 TABLET | Refills: 3 | Status: SHIPPED | OUTPATIENT
Start: 2022-07-11 | End: 2022-10-05 | Stop reason: SDUPTHER

## 2022-07-11 ASSESSMENT — ENCOUNTER SYMPTOMS
TROUBLE SWALLOWING: 0
CHOKING: 0
STRIDOR: 0
BLOOD IN STOOL: 0
COUGH: 0
SHORTNESS OF BREATH: 1
ABDOMINAL DISTENTION: 0
ABDOMINAL PAIN: 0
VOICE CHANGE: 0
CHEST TIGHTNESS: 0
VOMITING: 0
APNEA: 0
COLOR CHANGE: 0
ANAL BLEEDING: 0
WHEEZING: 0
NAUSEA: 0

## 2022-07-11 NOTE — PROGRESS NOTES
[address]  Dept: [address]  Loc: [phone]     7/11/2022       Beola Handler is here today for   Chief Complaint   Patient presents with    Follow-up           Referring Physician:  No ref. provider found     Patient Active Problem List   Diagnosis    [facility]) [facility]    CAD (coronary artery disease)    Post PTCA with MI    Hyperlipidemia    HTN (hypertension)    Tobacco abuse    Polyp of colon    Chest pain syndrome    Abnormal nuclear stress test    Diabetes mellitus type II, uncontrolled ([facility]    S/P CABG x 2    Uncontrolled type 2 diabetes mellitus, without long-term current use of insulin [facility]    Mild left ventricular systolic dysfunction       Review of Systems   Constitutional: Negative for activity change, appetite change, fatigue, fever and unexpected weight change. HENT: Negative for congestion, trouble swallowing and voice change. Eyes: Negative for visual disturbance. Respiratory: Positive for shortness of breath. Negative for apnea, cough, choking, chest tightness, wheezing and stridor. Cardiovascular: Positive for leg swelling. Negative for chest pain and palpitations. Gastrointestinal: Negative for abdominal distention, abdominal pain, anal bleeding, blood in stool, nausea and vomiting. Endocrine: Negative for cold intolerance and heat intolerance. Genitourinary: Negative for hematuria. Musculoskeletal: Negative for arthralgias, gait problem, joint swelling and myalgias. Skin: Negative for color change and rash. Allergic/Immunologic: Negative for environmental allergies and food allergies. Neurological: Positive for dizziness. Negative for tremors, syncope, facial asymmetry, weakness, light-headedness, numbness and headaches. Hematological: Does not bruise/bleed easily.    Psychiatric/Behavioral: Negative for agitation, behavioral problems and sleep disturbance. Past Medical History:   Diagnosis Date    CAD (coronary artery disease)     [facility]     COPD (chronic obstructive pulmonary disease) (HCC)     Diabetes mellitus (HCC)     Hyperlipidemia     PONV (postoperative nausea and vomiting)     Sleep apnea     has CPAP doesn't wear       No Known Allergies    Current Outpatient Medications   Medication Sig Dispense Refill    clopidogrel (PLAVIX) 75 MG tablet Take 1 tablet by mouth daily 90 tablet 3    atorvastatin (LIPITOR) 80 MG tablet Take 1 tablet by mouth nightly 90 tablet 3    metoprolol succinate (TOPROL XL) 50 MG extended release tablet Take 0.5 tablets by mouth every morning 90 tablet 3    Empagliflozin-metFORMIN HCl (SYNJARDY) 5-1000 MG TABS Take 1 tablet BID 60 tablet 5    aspirin 325 MG tablet Take 325 mg by mouth daily      glucose blood VI test strips (FREESTYLE LITE) strip TEST twice a day 100 strip 3    FREESTYLE LANCETS MISC TEST twice a day 100 each 3    NITROGLYCERIN PO Place under the tongue as needed       losartan (COZAAR) 25 MG tablet Take 1 tablet by mouth daily (Patient not taking: Reported on 7/11/2022) 90 tablet 3     No current facility-administered medications for this visit.        Social History     Socioeconomic History    Marital status:      Spouse name: Tayo Somers Number of children: 2    Years of education: 15    Highest education level: High school graduate   Occupational History    None   Tobacco Use    Smoking status: Current Every Day Smoker     Packs/day: 1.00     Years: 32.00     Pack years: 32.00     Types: Cigarettes    Smokeless tobacco: Never Used   Substance and Sexual Activity    Alcohol use: No     Alcohol/week: 0.0 standard drinks    Drug use: No    Sexual activity: Yes     Partners: Female   Other Topics Concern    None   Social History Narrative    None     Social Determinants of Health Financial Resource Strain: Low Risk     Difficulty of Paying Living Expenses: Not hard at all   Food Insecurity: No Food Insecurity    Worried About Running Out of Food in the Last Year: Never true    [place] of Food in the Last Year: Never true   Transportation Needs:     Lack of Transportation (Medical): Not on file    Lack of Transportation (Non-Medical): Not on file   Physical Activity:     Days of Exercise per Week: Not on file    Minutes of Exercise per Session: Not on file   Stress:     Feeling of Stress : Not on file   Social Connections:     Frequency of Communication with Friends and Family: Not on file    Frequency of Social Gatherings with Friends and Family: Not on file    Attends [employer] Services: Not on file    Active Member of [address] [place] [address] [employer] or Organizations: Not on file    Attends Club or Organization Meetings: Not on file    Marital Status: Not on file   Intimate Partner Violence:     Fear of Current or Ex-Partner: Not on file    Emotionally Abused: Not on file    Physically Abused: Not on file    Sexually Abused: Not on file   Housing Stability:     Unable to Pay for Housing in the Last Year: Not on file    Number of [place] in the Last Year: Not on file    Unstable Housing in the Last Year: Not on file       Family History   Problem Relation Age of Onset    High Blood Pressure Mother     Heart Disease Father     High Blood Pressure Sister     Heart Disease Brother     Heart Disease Brother     Diabetes Paternal Cousin     Cancer Neg Hx     Stroke Neg Hx        Blood pressure (!) 142/78, pulse 82, resp. rate 16, height 5' 10.5\" (1.791 m), weight 202 lb (91.6 kg).     Physical Exam:    General Appearance: alert and oriented to person, place and time, in no acute distress  Cardiovascular: normal rate, regular rhythm, normal S1 and S2, no murmurs, rubs, clicks, or gallops, distal pulses intact, no carotid bruits, no JVD  Pulmonary/Chest: clear to auscultation bilaterally- no wheezes, rales or rhonchi, normal air movement, no respiratory distress  Abdomen: soft, non-tender, non-distended, normal bowel sounds, no masses   Extremities: no cyanosis, clubbing or edema, pulse   Skin: warm and dry, no rash or erythema  Head: normocephalic and atraumatic  Eyes: pupils equal, round, and reactive to light  Neck: supple and non-tender without mass, no thyromegaly   Musculoskeletal: normal range of motion, no joint swelling, deformity or tenderness  Neurological: alert, oriented, normal speech, no focal findings or movement disorder noted    Lab Data:    Cardiac Enzymes:  No results for input(s): CKTOTAL, CKMB, CKMBINDEX, TROPONINI in the last 72 hours.     CBC:   Lab Results   Component Value Date/Time    WBC 7.3 11/29/2021 07:44 AM    RBC 5.09 11/29/2021 07:44 AM    RBC 4.19 09/02/2011 07:06 AM    HGB 16.6 11/29/2021 07:44 AM    HCT 50.0 11/29/2021 07:44 AM     11/29/2021 07:44 AM       CMP:    Lab Results   Component Value Date/Time     11/29/2021 07:43 AM    K 4.4 11/29/2021 07:43 AM    K 5.1 05/10/2018 11:10 AM     11/29/2021 07:43 AM    CO2 23 11/29/2021 07:43 AM    BUN 17 11/29/2021 07:43 AM    CREATININE 1.0 11/29/2021 07:43 AM    AGRATIO 1.8 11/18/2015 08:16 AM    LABGLOM 76 11/29/2021 07:43 AM    GLUCOSE 238 11/29/2021 07:43 AM    GLUCOSE 139 11/18/2015 08:16 AM    CALCIUM 9.7 11/29/2021 07:43 AM       Hepatic Function Panel:    Lab Results   Component Value Date/Time    ALKPHOS 71 11/29/2021 07:43 AM    ALT 11 11/29/2021 07:43 AM    AST 11 11/29/2021 07:43 AM    PROT 7.5 11/29/2021 07:43 AM    BILITOT 0.4 11/29/2021 07:43 AM    BILIDIR <0.2 03/30/2018 07:04 AM    LABALBU 4.4 11/29/2021 07:43 AM    LABALBU 4.0 09/02/2011 07:06 AM       Magnesium:    Lab Results   Component Value Date/Time    MG 2.5 06/24/2014 05:00 AM       PT/INR:    Lab Results   Component Value Date/Time    INR 0.91 06/19/2014 07:50 AM       HgBA1c:    Lab Results   Component Value Date/Time    LABA1C 9.5 11/29/2021 07:43 AM       FLP:    Lab Results   Component Value Date/Time    TRIG 135 11/29/2021 07:43 AM    HDL 46 11/29/2021 07:43 AM    LDLCALC 163 11/29/2021 07:43 AM    LDLDIRECT 103 10/31/2015 08:57 AM       TSH:    Lab Results   Component Value Date/Time    TSH 2.290 11/29/2021 07:43 AM        Diagnosis Orders   1. Coronary artery disease due to lipid rich plaque  06794 - [place] ELECTROCARDIOGRAM, COMPLETE    Tilt table test    CARDIAC STRESS TEST EXERCISE ONLY   2. Pure hypercholesterolemia  atorvastatin (LIPITOR) 80 MG tablet    Tilt table test    CARDIAC STRESS TEST EXERCISE ONLY   3. SOB (shortness of breath) on exertion  Tilt table test    CARDIAC STRESS TEST EXERCISE ONLY    ECHO Complete 2D W Doppler W Color        Assessment/Plan    Zeferino Dover is 58years old gentleman with known history of coronary artery disease last seen here back in 2018. Patient has less than optimal compliance with medication he is complaining of chest pain shortness of breath and the pedal edema. The patient had history of CABG and a history of mild systolic dysfunction of the left ventricle he stopped all his medication in his own. Has a history of hypercholesteremia supposed to be on Lipitor has a history of diabetes and mellitus. He has noticed some green weight of his weight and also he denied dizziness has occasional palpitation he will get dizzy however if he is try to stand up fast.  The patient lab work showed his marked elevation of his cholesterol. Patient was educated about the importance of compliance he was placed on the beta-blockers and the Lipitor arrangements will be made for him to have a tilt table test for his dizziness to rule out postural hypotension and an echocardiogram to evaluate left ventricular function valvular heart disease we will have a stress test on the patient.   And he will be seen after the above test he is to seek medical attention if he had any change in clinical condition he is to follow-up with family physician for his diabetes and he was counseled about the smoke cessation diet and risk factor modification pending the results of the above test further recommendation will be made    Orders Placed This Encounter   Procedures    Tilt table test     Standing Status:   Future     Standing Expiration Date:   7/11/2023    CARDIAC STRESS TEST EXERCISE ONLY     Meds to hold:none     Standing Status:   Future     Standing Expiration Date:   7/11/2023     Order Specific Question:   Reason for Exam?     Answer:   Chest pain     Order Specific Question:   Reason for Exam?     Answer:   Shortness of breath    ECHO Complete 2D W Doppler W Color     Standing Status:   Future     Standing Expiration Date:   7/11/2023     Order Specific Question:   Reason for exam:     Answer:   left ventricular function    63580 - [place] ELECTROCARDIOGRAM, COMPLETE       Return in about 4 weeks (around 8/8/2022) for sob.      Regi Fernandez MD

## 2022-07-22 ENCOUNTER — PROCEDURE VISIT (OUTPATIENT)
Dept: CARDIOLOGY CLINIC | Age: 62
End: 2022-07-22
Payer: MEDICAID

## 2022-07-22 VITALS
SYSTOLIC BLOOD PRESSURE: 118 MMHG | HEIGHT: 70 IN | HEART RATE: 69 BPM | DIASTOLIC BLOOD PRESSURE: 64 MMHG | BODY MASS INDEX: 28.92 KG/M2 | WEIGHT: 202 LBS

## 2022-07-22 DIAGNOSIS — I25.83 CORONARY ARTERY DISEASE DUE TO LIPID RICH PLAQUE: Primary | ICD-10-CM

## 2022-07-22 DIAGNOSIS — R06.02 SOB (SHORTNESS OF BREATH): ICD-10-CM

## 2022-07-22 DIAGNOSIS — I25.10 CORONARY ARTERY DISEASE DUE TO LIPID RICH PLAQUE: ICD-10-CM

## 2022-07-22 DIAGNOSIS — E78.00 PURE HYPERCHOLESTEROLEMIA: ICD-10-CM

## 2022-07-22 DIAGNOSIS — I25.10 CORONARY ARTERY DISEASE DUE TO LIPID RICH PLAQUE: Primary | ICD-10-CM

## 2022-07-22 DIAGNOSIS — I25.83 CORONARY ARTERY DISEASE DUE TO LIPID RICH PLAQUE: ICD-10-CM

## 2022-07-22 DIAGNOSIS — R06.02 SOB (SHORTNESS OF BREATH) ON EXERTION: ICD-10-CM

## 2022-07-27 ENCOUNTER — HOSPITAL ENCOUNTER (OUTPATIENT)
Dept: NON INVASIVE DIAGNOSTICS | Age: 62
Discharge: HOME | End: 2022-07-27
Payer: MEDICAID

## 2022-07-27 DIAGNOSIS — R06.02 SOB (SHORTNESS OF BREATH) ON EXERTION: ICD-10-CM

## 2022-07-27 LAB
LV EF: 33 %
LVEF MODALITY: NORMAL

## 2022-07-27 PROCEDURE — 93306 TTE W/DOPPLER COMPLETE: CPT

## 2022-08-01 PROCEDURE — 93015 CV STRESS TEST SUPVJ I&R: CPT | Performed by: INTERNAL MEDICINE

## 2022-08-01 NOTE — PROGRESS NOTES
.. [address]  [place] [address]  Dept: [address]: [phone]  8/1/2022  No ref. provider found     Cesar Carolina   1960  [id number]      Regular STRESS TEST    HISTORY AND INDICATION:    Is 58years old gentleman with a history of shortness of breath chest pain history of CABG and been having angina pectoris referred for stress test history of hypercholesterolemia      STRESS PROTOCOL:    Patient exercise according to standard [facility] protocol for 3 minutes and 43 seconds maximum workload attained about 5 minutes    His heart rate was up from  75% predicted heart rate rapid decrease of the heart rate and recovery. His blood pressure increased from 118/64 274/82 the clinically patient has shortness of breath he has frequent PVCs he had about 1 mm ST segment depression in the inferolateral leads. His last for about 6 minutes in the recovery. IMPRESSION:    Exercise tolerance on the patient achieved 75% predicted heart rate    Clinically patient has shortness of breath    Normal response of the blood pressure to exercise Patient has PVCs the peak of the exercise and into recovery. About 1 mm ST segment depression inferolateral leads persist about 5 minutes in the recovery.     Stress test is abnormal    Patient placed on Norvasc at 5 mg and he will be seen in 8 weeks continue if you continue to be symptomatic further cardiac work-up is needed    Saul Roblero MD8/1/20225:19 PM

## 2022-08-09 ENCOUNTER — HOSPITAL ENCOUNTER (OUTPATIENT)
Dept: NON INVASIVE DIAGNOSTICS | Age: 62
Discharge: HOME | End: 2022-08-09
Payer: MEDICAID

## 2022-08-09 VITALS — WEIGHT: 210 LBS | BODY MASS INDEX: 29.4 KG/M2 | HEIGHT: 71 IN

## 2022-08-09 DIAGNOSIS — I25.10 CORONARY ARTERY DISEASE DUE TO LIPID RICH PLAQUE: ICD-10-CM

## 2022-08-09 DIAGNOSIS — E78.00 PURE HYPERCHOLESTEROLEMIA: ICD-10-CM

## 2022-08-09 DIAGNOSIS — R06.02 SOB (SHORTNESS OF BREATH) ON EXERTION: ICD-10-CM

## 2022-08-09 DIAGNOSIS — I25.83 CORONARY ARTERY DISEASE DUE TO LIPID RICH PLAQUE: ICD-10-CM

## 2022-08-09 PROBLEM — R42 DIZZINESSES: Status: ACTIVE | Noted: 2022-08-09

## 2022-08-09 PROCEDURE — 93660 TILT TABLE EVALUATION: CPT | Performed by: INTERNAL MEDICINE

## 2022-08-09 NOTE — PROCEDURES
[address] [place] [address]                                 EVENT MONITOR    PATIENT NAME: Rosario Nowak                  :        1960  MED REC NO:   [id number]                           ROOM:  ACCOUNT NO:   [de-identified]                           ADMIT DATE: 2022  PROVIDER:     Holden Kitchen M.D.    TEST TYPE:  Tilt table test.    INDICATION FOR STUDY:  This is a patient who is 20-year-old gentleman  who had been having dizzy spells and near syncope, nonspecific, referred  for a tilt table test to rule out postural hypotension as the cause for  his symptoms. His heart rate resting was 78, blood pressure 137/78. Heart rate goes  to 78 at the first minute, blood pressure 142/83. He was a tilted for a  total of 30 minutes. During the time of the tilting, the patient  continued to be in sinus rhythm with the heart rate is in 84. He has  occasional PVCs. At the peak of the tilt table test, he did feel some  dizziness and somewhat anxious. His blood pressure at the time was  104/72. After that, his blood pressure increased to 152/64. He is in  sinus rhythm, rate of 85, no pauses. The lowest blood pressure was  104/72 at 25 minutes on the treadmill with a heart rate of 80. IMPRESSION:  The patient did develop dizziness, but is not as severe  symptoms as at home at the peak of the tilt table test about 25 minutes. No significant change in his heart rate, has occasional PVCs. Borderline dropping of his blood pressure. Tilt table test is borderline abnormal, and clinical correlation is  recommended.         Tito Noble M.D.    D: 2022 9:28:19       T: 2022 10:29:55     AS/NOLVIA_FOREIGN  Job#: [id number]     Doc#: [id number]    CC:

## 2022-10-05 ENCOUNTER — OFFICE VISIT (OUTPATIENT)
Dept: CARDIOLOGY CLINIC | Age: 62
End: 2022-10-05
Payer: MEDICAID

## 2022-10-05 VITALS
HEART RATE: 83 BPM | WEIGHT: 197.4 LBS | SYSTOLIC BLOOD PRESSURE: 129 MMHG | HEIGHT: 71 IN | BODY MASS INDEX: 27.64 KG/M2 | DIASTOLIC BLOOD PRESSURE: 72 MMHG | RESPIRATION RATE: 22 BRPM

## 2022-10-05 DIAGNOSIS — R07.9 CHEST PAIN SYNDROME: Primary | ICD-10-CM

## 2022-10-05 DIAGNOSIS — E78.00 PURE HYPERCHOLESTEROLEMIA: ICD-10-CM

## 2022-10-05 DIAGNOSIS — E11.59 ANGINA PECTORIS ASSOCIATED WITH TYPE 2 DIABETES MELLITUS (HCC): ICD-10-CM

## 2022-10-05 DIAGNOSIS — I20.9 ANGINA PECTORIS ASSOCIATED WITH TYPE 2 DIABETES MELLITUS (HCC): ICD-10-CM

## 2022-10-05 PROCEDURE — 3017F COLORECTAL CA SCREEN DOC REV: CPT | Performed by: INTERNAL MEDICINE

## 2022-10-05 PROCEDURE — G8419 CALC BMI OUT NRM PARAM NOF/U: HCPCS | Performed by: INTERNAL MEDICINE

## 2022-10-05 PROCEDURE — 99214 OFFICE O/P EST MOD 30 MIN: CPT | Performed by: INTERNAL MEDICINE

## 2022-10-05 PROCEDURE — 3046F HEMOGLOBIN A1C LEVEL >9.0%: CPT | Performed by: INTERNAL MEDICINE

## 2022-10-05 PROCEDURE — G8427 DOCREV CUR MEDS BY ELIG CLIN: HCPCS | Performed by: INTERNAL MEDICINE

## 2022-10-05 PROCEDURE — 2022F DILAT RTA XM EVC RTNOPTHY: CPT | Performed by: INTERNAL MEDICINE

## 2022-10-05 PROCEDURE — 4004F PT TOBACCO SCREEN RCVD TLK: CPT | Performed by: INTERNAL MEDICINE

## 2022-10-05 PROCEDURE — G8484 FLU IMMUNIZE NO ADMIN: HCPCS | Performed by: INTERNAL MEDICINE

## 2022-10-05 PROCEDURE — 93000 ELECTROCARDIOGRAM COMPLETE: CPT | Performed by: INTERNAL MEDICINE

## 2022-10-05 RX ORDER — AMLODIPINE BESYLATE 5 MG/1
5 TABLET ORAL DAILY
Qty: 30 TABLET | Refills: 3 | Status: SHIPPED | OUTPATIENT
Start: 2022-10-05

## 2022-10-05 RX ORDER — AMLODIPINE BESYLATE 5 MG/1
5 TABLET ORAL DAILY
COMMUNITY
End: 2022-10-05 | Stop reason: SDUPTHER

## 2022-10-05 RX ORDER — NITROGLYCERIN 0.4 MG/1
0.4 TABLET SUBLINGUAL EVERY 5 MIN PRN
Qty: 25 TABLET | Refills: 3 | Status: SHIPPED | OUTPATIENT
Start: 2022-10-05

## 2022-10-05 RX ORDER — ATORVASTATIN CALCIUM 80 MG/1
80 TABLET, FILM COATED ORAL NIGHTLY
Qty: 90 TABLET | Refills: 3 | Status: ON HOLD | OUTPATIENT
Start: 2022-10-05 | End: 2022-10-31 | Stop reason: SDUPTHER

## 2022-10-05 RX ORDER — METOPROLOL SUCCINATE 50 MG/1
25 TABLET, EXTENDED RELEASE ORAL EVERY MORNING
Qty: 45 TABLET | Refills: 3 | Status: SHIPPED | OUTPATIENT
Start: 2022-10-05

## 2022-10-05 RX ORDER — LOSARTAN POTASSIUM 50 MG/1
50 TABLET ORAL DAILY
Qty: 30 TABLET | Refills: 11 | Status: ON HOLD | OUTPATIENT
Start: 2022-10-05 | End: 2022-10-31 | Stop reason: HOSPADM

## 2022-10-05 RX ORDER — NITROGLYCERIN 0.4 MG/1
0.4 TABLET SUBLINGUAL EVERY 5 MIN PRN
COMMUNITY
End: 2022-10-05 | Stop reason: SDUPTHER

## 2022-10-05 RX ORDER — CLOPIDOGREL BISULFATE 75 MG/1
75 TABLET ORAL DAILY
Qty: 90 TABLET | Refills: 3 | Status: SHIPPED | OUTPATIENT
Start: 2022-10-05

## 2022-10-05 ASSESSMENT — ENCOUNTER SYMPTOMS
ABDOMINAL PAIN: 0
COUGH: 0
CHOKING: 0
TROUBLE SWALLOWING: 0
STRIDOR: 0
NAUSEA: 0
CHEST TIGHTNESS: 1
SHORTNESS OF BREATH: 1
ABDOMINAL DISTENTION: 0
APNEA: 0
COLOR CHANGE: 0
ANAL BLEEDING: 0
WHEEZING: 0
VOICE CHANGE: 0
VOMITING: 0
BLOOD IN STOOL: 0

## 2022-10-05 NOTE — PROGRESS NOTES
[address] [facility] [facility]  Dept: [address]  Loc: [phone]     10/5/2022       Dl Stevens is here today for   Chief Complaint   Patient presents with    Follow-up           Referring Physician:  No ref. provider found     Patient Active Problem List   Diagnosis    COPD (chronic obstructive pulmonary disease) (HCC)    CAD (coronary artery disease)    Post PTCA with MI    Hyperlipidemia    HTN (hypertension)    Tobacco abuse    Polyp of colon    Chest pain syndrome    Abnormal nuclear stress test    Diabetes mellitus type II, uncontrolled    S/P CABG x 2    Uncontrolled type 2 diabetes mellitus, without long-term current use of insulin    Mild left ventricular systolic dysfunction    Dizzinesses       Review of Systems   Constitutional:  Negative for activity change, appetite change, fatigue, fever and unexpected weight change. HENT:  Negative for congestion, trouble swallowing and voice change. Eyes:  Negative for visual disturbance. Respiratory:  Positive for chest tightness and shortness of breath. Negative for apnea, cough, choking, wheezing and stridor. Cardiovascular:  Negative for chest pain, palpitations and leg swelling. Gastrointestinal:  Negative for abdominal distention, abdominal pain, anal bleeding, blood in stool, nausea and vomiting. Endocrine: Negative for cold intolerance and heat intolerance. Genitourinary:  Negative for hematuria. Musculoskeletal:  Negative for arthralgias, gait problem, joint swelling and myalgias. Skin:  Negative for color change and rash. Allergic/Immunologic: Negative for environmental allergies and food allergies. Neurological:  Negative for dizziness, tremors, syncope, facial asymmetry, weakness, light-headedness, numbness and headaches. Hematological:  Does not bruise/bleed easily.    Psychiatric/Behavioral:  Negative for agitation, behavioral problems and sleep disturbance. Past Medical History:   Diagnosis Date    CAD (coronary artery disease)     CH[facility]congest[facility] heart failure) ([facility])     COPD (chronic obstructive pulmonary disease) (HCC)     Diabetes mellitus (HCC)     Hyperlipidemia     PONV (postoperative nausea and vomiting)     Sleep apnea     has CPAP doesn't wear       No Known Allergies    Current Outpatient Medications   Medication Sig Dispense Refill    amLODIPine (NORVASC) 5 MG tablet Take 1 tablet by mouth daily 30 tablet 3    metoprolol succinate (TOPROL XL) 50 MG extended release tablet Take 0.5 tablets by mouth every morning 45 tablet 3    atorvastatin (LIPITOR) 80 MG tablet Take 1 tablet by mouth nightly 90 tablet 3    clopidogrel (PLAVIX) 75 MG tablet Take 1 tablet by mouth daily 90 tablet 3    nitroGLYCERIN (NITROSTAT) 0.4 MG SL tablet Place 1 tablet under the tongue every 5 minutes as needed for Chest pain up to max of 3 total doses. If no relief after 1 dose, call 911. 25 tablet 3    losartan (COZAAR) 50 MG tablet Take 1 tablet by mouth daily 30 tablet 11    nitroGLYCERIN (NITROSTAT) 0.4 MG SL tablet Place 1 tablet under the tongue every 5 minutes as needed for Chest pain 25 tablet 3    Empagliflozin-metFORMIN HCl (SYNJARDY) 5-1000 MG TABS Take 1 tablet BID 60 tablet 5    aspirin 325 MG tablet Take 325 mg by mouth daily      glucose blood VI test strips (FREESTYLE LITE) strip TEST twice a day 100 strip 3    FREESTYLE LANCETS MISC TEST twice a day 100 each 3     No current facility-administered medications for this visit. Social History     Socioeconomic History    Marital status:      Spouse name: Daniel Hernández    Number of children: 2    Years of education: 12    Highest education level: High school graduate   Tobacco Use    Smoking status: Every Day     Packs/day: 1.00     Years: 32.00     Pack years: 32.00     Types: Cigarettes    Smokeless tobacco: Never   Substance and Sexual Activity    Alcohol use:  No Alcohol/week: 0.0 standard drinks    Drug use: No    Sexual activity: Yes     Partners: Female     Social Determinants of Health     Financial Resource Strain: Low Risk     Difficulty of Paying Living Expenses: Not hard at all   Food Insecurity: No Food Insecurity    Worried About Running Out of Food in the Last Year: Never true    Ran Out of Food in the Last Year: Never true       Family History   Problem Relation Age of Onset    High Blood Pressure Mother     Heart Disease Father     High Blood Pressure Sister     Heart Disease Brother     Heart Disease Brother     Diabetes Paternal Cousin     Cancer Neg Hx     Stroke Neg Hx        Blood pressure 129/72, pulse 83, resp. rate 22, height 5' 10.5\" (1.791 m), weight 197 lb 6.4 oz (89.5 kg). Physical Exam:    General Appearance: alert and oriented to person, place and time, in no acute distress  Cardiovascular: normal rate, regular rhythm, normal S1 and S2, no murmurs, rubs, clicks, or gallops, distal pulses intact, no carotid bruits, no JVD  Pulmonary/Chest: clear to auscultation bilaterally- no wheezes, rales or rhonchi, normal air movement, no respiratory distress  Abdomen: soft, non-tender, non-distended, normal bowel sounds, no masses   Extremities: no cyanosis, clubbing or edema, pulse   Skin: warm and dry, no rash or erythema  Head: normocephalic and atraumatic  Eyes: pupils equal, round, and reactive to light  Neck: supple and non-tender without mass, no thyromegaly   Musculoskeletal: normal range of motion, no joint swelling, deformity or tenderness  Neurological: alert, oriented, normal speech, no focal findings or movement disorder noted    Lab Data:    Cardiac Enzymes:  No results for input(s): CKTOTAL, CKMB, CKMBINDEX, TROPONINI in the last 72 hours.     CBC:   Lab Results   Component Value Date/Time    WBC 7.3 11/29/2021 07:44 AM    RBC 5.09 11/29/2021 07:44 AM    RBC 4.19 09/02/2011 07:06 AM    HGB 16.6 11/29/2021 07:44 AM    HCT 50.0 11/29/2021 07:44 AM     11/29/2021 07:44 AM       CMP:    Lab Results   Component Value Date/Time     11/29/2021 07:43 AM    K 4.4 11/29/2021 07:43 AM    K 5.1 05/10/2018 11:10 AM     11/29/2021 07:43 AM    CO2 23 11/29/2021 07:43 AM    BUN 17 11/29/2021 07:43 AM    CREATININE 1.0 11/29/2021 07:43 AM    AGRATIO 1.8 11/18/2015 08:16 AM    LABGLOM 76 11/29/2021 07:43 AM    GLUCOSE 238 11/29/2021 07:43 AM    GLUCOSE 139 11/18/2015 08:16 AM    CALCIUM 9.7 11/29/2021 07:43 AM       Hepatic Function Panel:    Lab Results   Component Value Date/Time    ALKPHOS 71 11/29/2021 07:43 AM    ALT 11 11/29/2021 07:43 AM    AST 11 11/29/2021 07:43 AM    PROT 7.5 11/29/2021 07:43 AM    BILITOT 0.4 11/29/2021 07:43 AM    BILIDIR <0.2 03/30/2018 07:04 AM    LABALBU 4.4 11/29/2021 07:43 AM    LABALBU 4.0 09/02/2011 07:06 AM       Magnesium:    Lab Results   Component Value Date/Time    MG 2.5 06/24/2014 05:00 AM       PT/INR:    Lab Results   Component Value Date/Time    INR 0.91 06/19/2014 07:50 AM       HgBA1c:    Lab Results   Component Value Date/Time    LABA1C 9.5 11/29/2021 07:43 AM       FLP:    Lab Results   Component Value Date/Time    TRIG 135 11/29/2021 07:43 AM    HDL 46 11/29/2021 07:43 AM    LDLCALC 163 11/29/2021 07:43 AM    LDLDIRECT 103 10/31/2015 08:57 AM       TSH:    Lab Results   Component Value Date/Time    TSH 2.290 11/29/2021 07:43 AM        Diagnosis Orders   1. Chest pain syndrome  07191 - [place] ELECTROCARDIOGRAM, COMPLETE    Left heart cath      2. Pure hypercholesterolemia  atorvastatin (LIPITOR) 80 MG tablet    Left heart cath      3. Angina pectoris associated with type 2 diabetes mellitus ([address]  Left heart cath           Assessment/Plan    Dictating on Siri Pacheco this is a patient who is a 58years old gentleman who recently had a stress test was abnormal he had echo showed systolic dysfunction of the left ventricle. Patient has history of the diabetes the milliliters.   The patient has a history of hypertension unfortunately still smoking has mild obesity still have angina pectoris his EKG showed sinus rhythm poor R wave progression V1 to V3 the stress test finding the echo findings the lab findings were discussed with him with his clinical situation continues to have the chest pain despite medical treatment and the above findings arrangements will be made for the patient to have a heart cath possible intervention he understand the heart catheter procedure the benefit the risk alternative methods with possible complication wants to be done he was was given nitroglycerin to take as needed advised about diet exercise seek medical attention with any change in clinical condition medication were reconciled and sent to his [facility] and he is to seek medical attention if he had any change in clinical condition thank    Orders Placed This Encounter   Procedures    20971 - [place] ELECTROCARDIOGRAM, COMPLETE       No follow-ups on file.      Miley De La Cruz MD

## 2022-10-18 ENCOUNTER — PRE-PROCEDURE TELEPHONE (OUTPATIENT)
Dept: INPATIENT UNIT | Age: 62
End: 2022-10-18

## 2022-10-18 NOTE — PROGRESS NOTES
Called patient  scheduled for heart cath  instructed to bring in  license,  Insurance cards, overnight bag, medications in the original bottles. Patient instructed on precedure and where and when to arrive. Medication to take day of procedure  went over with patient. NPO after midnight , 12 hour fasting. Wear comfortable clean clothes, shower morning of and night before with liquid antibacterial,and  remove jewery. Follow all instructions given  to you by your doctor. Please notify your doctor if you need to cancel or reschedule your procedure.  needed at discharge. Instructed to take usual heart medications, no diabetic meds am of procedure.

## 2022-10-20 ENCOUNTER — HOSPITAL ENCOUNTER (OUTPATIENT)
Dept: INPATIENT UNIT | Age: 62
Discharge: HOME | End: 2022-10-21
Attending: INTERNAL MEDICINE | Admitting: INTERNAL MEDICINE
Payer: MEDICAID

## 2022-10-20 LAB
ABO: NORMAL
ACTIVATED CLOTTING TIME: 376 SECONDS (ref 1–150)
ACTIVATED CLOTTING TIME: 393 SECONDS (ref 1–150)
ACTIVATED CLOTTING TIME: 584 SECONDS (ref 1–150)
ALBUMIN SERPL-MCNC: 4 G/DL (ref 3.5–5.1)
ALP BLD-CCNC: 62 U/L (ref 38–126)
ALT SERPL-CCNC: 19 U/L (ref 11–66)
ANION GAP SERPL CALCULATED.3IONS-SCNC: 12 MEQ/L (ref 8–16)
ANTIBODY SCREEN: NORMAL
AST SERPL-CCNC: 19 U/L (ref 5–40)
BILIRUB SERPL-MCNC: 0.2 MG/DL (ref 0.3–1.2)
BUN BLDV-MCNC: 21 MG/DL (ref 7–22)
CALCIUM SERPL-MCNC: 9.5 MG/DL (ref 8.5–10.5)
CHLORIDE BLD-SCNC: 103 MEQ/L (ref 98–111)
CO2: 21 MEQ/L (ref 23–33)
CREAT SERPL-MCNC: 1 MG/DL (ref 0.4–1.2)
ERYTHROCYTE [DISTWIDTH] IN BLOOD BY AUTOMATED COUNT: 14 % (ref 11.5–14.5)
ERYTHROCYTE [DISTWIDTH] IN BLOOD BY AUTOMATED COUNT: 50.6 FL (ref 35–45)
GFR SERPL CREATININE-BSD FRML MDRD: > 60 ML/MIN/1.73M2
GLUCOSE BLD-MCNC: 242 MG/DL (ref 70–108)
HCT VFR BLD CALC: 45.7 % (ref 42–52)
HEMOGLOBIN: 15.2 GM/DL (ref 14–18)
INR BLD: 0.92 (ref 0.85–1.13)
MCH RBC QN AUTO: 32.5 PG (ref 26–33)
MCHC RBC AUTO-ENTMCNC: 33.3 GM/DL (ref 32.2–35.5)
MCV RBC AUTO: 97.9 FL (ref 80–94)
PLATELET # BLD: 246 THOU/MM3 (ref 130–400)
PMV BLD AUTO: 9.3 FL (ref 9.4–12.4)
POTASSIUM REFLEX MAGNESIUM: 5.2 MEQ/L (ref 3.5–5.2)
RBC # BLD: 4.67 MILL/MM3 (ref 4.7–6.1)
RH FACTOR: NORMAL
SODIUM BLD-SCNC: 136 MEQ/L (ref 135–145)
TOTAL PROTEIN: 6.9 G/DL (ref 6.1–8)
WBC # BLD: 8.1 THOU/MM3 (ref 4.8–10.8)

## 2022-10-20 PROCEDURE — 93459 L HRT ART/GRFT ANGIO: CPT | Performed by: INTERNAL MEDICINE

## 2022-10-20 PROCEDURE — 2500000003 HC RX 250 WO HCPCS

## 2022-10-20 PROCEDURE — 2500000003 HC RX 250 WO HCPCS: Performed by: INTERNAL MEDICINE

## 2022-10-20 PROCEDURE — 6370000000 HC RX 637 (ALT 250 FOR IP): Performed by: INTERNAL MEDICINE

## 2022-10-20 PROCEDURE — 86850 RBC ANTIBODY SCREEN: CPT

## 2022-10-20 PROCEDURE — 92928 PRQ TCAT PLMT NTRAC ST 1 LES: CPT | Performed by: INTERNAL MEDICINE

## 2022-10-20 PROCEDURE — C1725 CATH, TRANSLUMIN NON-LASER: HCPCS

## 2022-10-20 PROCEDURE — 92928 PRQ TCAT PLMT NTRAC ST 1 LES: CPT

## 2022-10-20 PROCEDURE — 85027 COMPLETE CBC AUTOMATED: CPT

## 2022-10-20 PROCEDURE — 92929 HC PRQ CARD STENT W/ANGIO ADDL: CPT

## 2022-10-20 PROCEDURE — 80053 COMPREHEN METABOLIC PANEL: CPT

## 2022-10-20 PROCEDURE — 6370000000 HC RX 637 (ALT 250 FOR IP)

## 2022-10-20 PROCEDURE — 85610 PROTHROMBIN TIME: CPT

## 2022-10-20 PROCEDURE — C1760 CLOSURE DEV, VASC: HCPCS

## 2022-10-20 PROCEDURE — 96365 THER/PROPH/DIAG IV INF INIT: CPT

## 2022-10-20 PROCEDURE — 86900 BLOOD TYPING SEROLOGIC ABO: CPT

## 2022-10-20 PROCEDURE — 2580000003 HC RX 258: Performed by: INTERNAL MEDICINE

## 2022-10-20 PROCEDURE — C1874 STENT, COATED/COV W/DEL SYS: HCPCS

## 2022-10-20 PROCEDURE — C1894 INTRO/SHEATH, NON-LASER: HCPCS

## 2022-10-20 PROCEDURE — 93459 L HRT ART/GRFT ANGIO: CPT

## 2022-10-20 PROCEDURE — 6360000004 HC RX CONTRAST MEDICATION: Performed by: INTERNAL MEDICINE

## 2022-10-20 PROCEDURE — 6360000002 HC RX W HCPCS

## 2022-10-20 PROCEDURE — C1769 GUIDE WIRE: HCPCS

## 2022-10-20 PROCEDURE — 92929 PR PRQ TRLUML CORONARY STENT W/ANGIO ADDL ART/BRNCH: CPT | Performed by: INTERNAL MEDICINE

## 2022-10-20 PROCEDURE — 86901 BLOOD TYPING SEROLOGIC RH(D): CPT

## 2022-10-20 PROCEDURE — C1887 CATHETER, GUIDING: HCPCS

## 2022-10-20 PROCEDURE — 85347 COAGULATION TIME ACTIVATED: CPT

## 2022-10-20 PROCEDURE — 93005 ELECTROCARDIOGRAM TRACING: CPT | Performed by: INTERNAL MEDICINE

## 2022-10-20 PROCEDURE — 96366 THER/PROPH/DIAG IV INF ADDON: CPT

## 2022-10-20 PROCEDURE — 36415 COLL VENOUS BLD VENIPUNCTURE: CPT

## 2022-10-20 PROCEDURE — 99152 MOD SED SAME PHYS/QHP 5/>YRS: CPT | Performed by: INTERNAL MEDICINE

## 2022-10-20 RX ORDER — SODIUM CHLORIDE 9 MG/ML
INJECTION, SOLUTION INTRAVENOUS CONTINUOUS
Status: DISCONTINUED | OUTPATIENT
Start: 2022-10-20 | End: 2022-10-21 | Stop reason: HOSPADM

## 2022-10-20 RX ORDER — NITROGLYCERIN 20 MG/100ML
5-200 INJECTION INTRAVENOUS CONTINUOUS
Status: DISCONTINUED | OUTPATIENT
Start: 2022-10-20 | End: 2022-10-21

## 2022-10-20 RX ORDER — ATORVASTATIN CALCIUM 80 MG/1
80 TABLET, FILM COATED ORAL NIGHTLY
Status: DISCONTINUED | OUTPATIENT
Start: 2022-10-20 | End: 2022-10-21 | Stop reason: HOSPADM

## 2022-10-20 RX ORDER — SODIUM CHLORIDE 9 MG/ML
INJECTION, SOLUTION INTRAVENOUS PRN
Status: DISCONTINUED | OUTPATIENT
Start: 2022-10-20 | End: 2022-10-21 | Stop reason: HOSPADM

## 2022-10-20 RX ORDER — ONDANSETRON 2 MG/ML
4 INJECTION INTRAMUSCULAR; INTRAVENOUS EVERY 6 HOURS PRN
Status: DISCONTINUED | OUTPATIENT
Start: 2022-10-20 | End: 2022-10-21 | Stop reason: HOSPADM

## 2022-10-20 RX ORDER — CLOPIDOGREL BISULFATE 75 MG/1
75 TABLET ORAL DAILY
Status: DISCONTINUED | OUTPATIENT
Start: 2022-10-21 | End: 2022-10-21 | Stop reason: HOSPADM

## 2022-10-20 RX ORDER — SODIUM CHLORIDE 0.9 % (FLUSH) 0.9 %
5-40 SYRINGE (ML) INJECTION EVERY 12 HOURS SCHEDULED
Status: DISCONTINUED | OUTPATIENT
Start: 2022-10-20 | End: 2022-10-21 | Stop reason: HOSPADM

## 2022-10-20 RX ORDER — AMLODIPINE BESYLATE 5 MG/1
5 TABLET ORAL DAILY
Status: DISCONTINUED | OUTPATIENT
Start: 2022-10-21 | End: 2022-10-21 | Stop reason: HOSPADM

## 2022-10-20 RX ORDER — ACETAMINOPHEN 325 MG/1
650 TABLET ORAL EVERY 4 HOURS PRN
Status: DISCONTINUED | OUTPATIENT
Start: 2022-10-20 | End: 2022-10-21 | Stop reason: HOSPADM

## 2022-10-20 RX ORDER — LOSARTAN POTASSIUM 50 MG/1
50 TABLET ORAL DAILY
Status: DISCONTINUED | OUTPATIENT
Start: 2022-10-21 | End: 2022-10-21 | Stop reason: HOSPADM

## 2022-10-20 RX ORDER — ATROPINE SULFATE 0.4 MG/ML
0.5 AMPUL (ML) INJECTION
Status: ACTIVE | OUTPATIENT
Start: 2022-10-20 | End: 2022-10-21

## 2022-10-20 RX ORDER — ASPIRIN 325 MG
325 TABLET ORAL DAILY
Status: DISCONTINUED | OUTPATIENT
Start: 2022-10-21 | End: 2022-10-21 | Stop reason: HOSPADM

## 2022-10-20 RX ORDER — SODIUM CHLORIDE 9 MG/ML
100 INJECTION, SOLUTION INTRAVENOUS CONTINUOUS
Status: ACTIVE | OUTPATIENT
Start: 2022-10-20 | End: 2022-10-20

## 2022-10-20 RX ORDER — SODIUM CHLORIDE 0.9 % (FLUSH) 0.9 %
5-40 SYRINGE (ML) INJECTION PRN
Status: DISCONTINUED | OUTPATIENT
Start: 2022-10-20 | End: 2022-10-21 | Stop reason: HOSPADM

## 2022-10-20 RX ORDER — ALPRAZOLAM 0.5 MG/1
0.5 TABLET ORAL ONCE
Status: COMPLETED | OUTPATIENT
Start: 2022-10-20 | End: 2022-10-20

## 2022-10-20 RX ORDER — NITROGLYCERIN 0.4 MG/1
0.4 TABLET SUBLINGUAL EVERY 5 MIN PRN
Status: DISCONTINUED | OUTPATIENT
Start: 2022-10-20 | End: 2022-10-20

## 2022-10-20 RX ORDER — ASPIRIN 325 MG
325 TABLET ORAL ONCE
Status: DISCONTINUED | OUTPATIENT
Start: 2022-10-20 | End: 2022-10-21 | Stop reason: HOSPADM

## 2022-10-20 RX ORDER — METOPROLOL SUCCINATE 25 MG/1
25 TABLET, EXTENDED RELEASE ORAL EVERY MORNING
Status: DISCONTINUED | OUTPATIENT
Start: 2022-10-21 | End: 2022-10-20 | Stop reason: SDUPTHER

## 2022-10-20 RX ORDER — METOPROLOL SUCCINATE 50 MG/1
25 TABLET, EXTENDED RELEASE ORAL EVERY MORNING
Status: DISCONTINUED | OUTPATIENT
Start: 2022-10-21 | End: 2022-10-21 | Stop reason: HOSPADM

## 2022-10-20 RX ADMIN — ALPRAZOLAM 0.5 MG: 0.5 TABLET ORAL at 11:24

## 2022-10-20 RX ADMIN — SODIUM CHLORIDE: 9 INJECTION, SOLUTION INTRAVENOUS at 06:57

## 2022-10-20 RX ADMIN — ATORVASTATIN CALCIUM 80 MG: 80 TABLET, FILM COATED ORAL at 22:13

## 2022-10-20 RX ADMIN — ACETAMINOPHEN 650 MG: 325 TABLET ORAL at 13:04

## 2022-10-20 RX ADMIN — IOPAMIDOL 290 ML: 755 INJECTION, SOLUTION INTRAVENOUS at 10:37

## 2022-10-20 RX ADMIN — SODIUM CHLORIDE, PRESERVATIVE FREE 5 ML: 5 INJECTION INTRAVENOUS at 20:27

## 2022-10-20 RX ADMIN — NITROGLYCERIN 5 MCG/MIN: 20 INJECTION INTRAVENOUS at 14:02

## 2022-10-20 ASSESSMENT — PAIN SCALES - GENERAL
PAINLEVEL_OUTOF10: 0
PAINLEVEL_OUTOF10: 5

## 2022-10-20 NOTE — FLOWSHEET NOTE
Took over care of pt. Dangled and up to br. Voided large amount urine. Up in room. Pt states pain level \"4\" in his  back. Right groin stable. Increased nitro gtt to 3 mls/hror 10 mcgs/min. Wife at bedside. Oxygen to room air.

## 2022-10-20 NOTE — FLOWSHEET NOTE
Denies back pain now but states pain by his heart is a \"4\".  Increased nitro gtt to 15 mcg/min or 4.5mls/hr

## 2022-10-20 NOTE — PROGRESS NOTES
Inpatient Cardiac Rehabilitation Consult    Received consult for Phase II Cardiac Rehabilitation. Patient needs cardiac rehab due to PCI on 10/20/22. Importance of Cardiac Rehab discussed with patient. Reviewed cardiac rehab class times. Patient questions answered. We will contact patient at home to schedule evaluation appointment. Pt unsure. Cardiac Rehab brochure given.

## 2022-10-20 NOTE — FLOWSHEET NOTE
Complaining  \"pain between shoulder blades, denies chest pain\"    EKG obtained and faxed to Dr Genoveva Carolina

## 2022-10-20 NOTE — OP NOTE
Operative Note      Patient: Mckayla Farias  Sedation/Analgesia Post Sedation Record      Pt Name: Alexis Brito  MRN: [id number]  YOB: 1960  Procedure Performed By: Inez Harding MD, MD  Primary Care Physician: Sagrario Keating DO    POST-PROCEDURE    Physician: Inez Harding MD, MD    Procedure Performed:   stent circ and om Percutaneous Coronary Intervention and Left Heart Cath    Sedation/Anesthesia:  Local Anesthesia and IV Conscious Sedation with continuous O2 monitoring    Estimated Blood Loss:  Minimal    Specimens Removed:  None    Complications:  None     Post Procedure Diagnosis/Findings:  Coronary Artery Disease    Recommendations:  Medical treatment and review films.        Inez Harding MD, MD  Electronically signed 10/20/2022 at 10:27 AM

## 2022-10-20 NOTE — PROGRESS NOTES
0600  Pt admitted to  2E12 ambulatory for left heart cath. Pt NPO. Patient accompanied by spouse, Drake Rodriguez. Vital signs obtained. Assessment and data collection initiated. Oriented to room. Policies and procedures for 2E explained   All questions answered with no further questions at this time. Fall prevention and safety precautions discussed with patient. Care plan reviewed with patient and spouse. Patient and spouse verbalize understanding of the plan of care and contribute to goal setting.      2723  to procedure

## 2022-10-20 NOTE — FLOWSHEET NOTE
Rates right chest pain \"3\" on 1 to 10 scale. No change in pain with increase in nitro gtt rate. Declines acetominophen at this time. Right groin stable.

## 2022-10-20 NOTE — PLAN OF CARE
Problem: Chronic Conditions and Co-morbidities  Goal: Patient's chronic conditions and co-morbidity symptoms are monitored and maintained or improved  Outcome: Adequate for Discharge     Problem: Discharge Planning  Goal: Discharge to home or other facility with appropriate resources  Outcome: Adequate for Discharge     Problem: Pain  Goal: Verbalizes/displays adequate comfort level or baseline comfort level  Outcome: Adequate for Discharge

## 2022-10-20 NOTE — FLOWSHEET NOTE
Received in Cath Recovery. Report received from Cath Lab. Right femoral site has no signs of bleeding or swelling, area soft. Tegaderm dressing clean, dry, and intact. IV 1000 0.9 NS infusing at 100 ml per hour in right forearm. Monitor showing NSR. See Post Cath Assessment flowsheet.    Complaining chest discomfort, \"5\"  EKG obtained and shown to Dr Nichole Calvillo per Wendy Section RN  Orders for Xanax received

## 2022-10-21 VITALS
OXYGEN SATURATION: 97 % | DIASTOLIC BLOOD PRESSURE: 65 MMHG | HEIGHT: 70 IN | SYSTOLIC BLOOD PRESSURE: 104 MMHG | HEART RATE: 63 BPM | RESPIRATION RATE: 18 BRPM | BODY MASS INDEX: 27.2 KG/M2 | TEMPERATURE: 98 F | WEIGHT: 190 LBS

## 2022-10-21 PROBLEM — I20.0 ANGINA PECTORIS, CRESCENDO (HCC): Status: ACTIVE | Noted: 2022-10-21

## 2022-10-21 LAB
EKG ATRIAL RATE: 59 BPM
EKG ATRIAL RATE: 60 BPM
EKG ATRIAL RATE: 68 BPM
EKG ATRIAL RATE: 76 BPM
EKG P AXIS: 43 DEGREES
EKG P AXIS: 44 DEGREES
EKG P AXIS: 45 DEGREES
EKG P AXIS: 62 DEGREES
EKG P-R INTERVAL: 164 MS
EKG P-R INTERVAL: 166 MS
EKG P-R INTERVAL: 166 MS
EKG P-R INTERVAL: 182 MS
EKG Q-T INTERVAL: 390 MS
EKG Q-T INTERVAL: 418 MS
EKG Q-T INTERVAL: 436 MS
EKG Q-T INTERVAL: 446 MS
EKG QRS DURATION: 100 MS
EKG QRS DURATION: 102 MS
EKG QRS DURATION: 94 MS
EKG QRS DURATION: 98 MS
EKG QTC CALCULATION (BAZETT): 436 MS
EKG QTC CALCULATION (BAZETT): 438 MS
EKG QTC CALCULATION (BAZETT): 441 MS
EKG QTC CALCULATION (BAZETT): 444 MS
EKG R AXIS: 108 DEGREES
EKG R AXIS: 119 DEGREES
EKG R AXIS: 84 DEGREES
EKG R AXIS: 98 DEGREES
EKG T AXIS: 24 DEGREES
EKG T AXIS: 33 DEGREES
EKG T AXIS: 58 DEGREES
EKG T AXIS: 64 DEGREES
EKG VENTRICULAR RATE: 59 BPM
EKG VENTRICULAR RATE: 60 BPM
EKG VENTRICULAR RATE: 68 BPM
EKG VENTRICULAR RATE: 76 BPM

## 2022-10-21 PROCEDURE — 99214 OFFICE O/P EST MOD 30 MIN: CPT | Performed by: INTERNAL MEDICINE

## 2022-10-21 PROCEDURE — 93010 ELECTROCARDIOGRAM REPORT: CPT | Performed by: INTERNAL MEDICINE

## 2022-10-21 PROCEDURE — 93005 ELECTROCARDIOGRAM TRACING: CPT | Performed by: INTERNAL MEDICINE

## 2022-10-21 RX ADMIN — AMLODIPINE BESYLATE 5 MG: 5 TABLET ORAL at 08:26

## 2022-10-21 RX ADMIN — METOPROLOL SUCCINATE 25 MG: 50 TABLET, EXTENDED RELEASE ORAL at 08:26

## 2022-10-21 RX ADMIN — LOSARTAN POTASSIUM 50 MG: 50 TABLET ORAL at 08:26

## 2022-10-21 RX ADMIN — Medication 325 MG: at 08:26

## 2022-10-21 RX ADMIN — CLOPIDOGREL BISULFATE 75 MG: 75 TABLET ORAL at 08:26

## 2022-10-21 NOTE — PLAN OF CARE
Problem: Cardiovascular - Adult  Goal: Maintains optimal cardiac output and hemodynamic stability  10/20/2022 2340 by Jenny Molina RN  Outcome: Progressing  Flowsheets (Taken 10/20/2022 2340)  Maintains optimal cardiac output and hemodynamic stability:   Monitor blood pressure and heart rate   Monitor urine output and notify Licensed Independent Practitioner for values outside of normal range   Assess for signs of decreased cardiac output     Problem: Cardiovascular - Adult  Goal: Absence of cardiac dysrhythmias or at baseline  10/20/2022 2340 by Jenny Molina RN  Outcome: Progressing  Flowsheets (Taken 10/20/2022 2340)  Absence of cardiac dysrhythmias or at baseline:   Monitor cardiac rate and rhythm   Assess for signs of decreased cardiac output     Problem: Skin/Tissue Integrity - Adult  Goal: Skin integrity remains intact  10/20/2022 2340 by Jenny Molina RN  Outcome: Progressing  Flowsheets (Taken 10/20/2022 2340)  Skin Integrity Remains Intact:   Monitor for areas of redness and/or skin breakdown   Assess vascular access sites hourly     Problem: Skin/Tissue Integrity - Adult  Goal: Incisions, wounds, or drain sites healing without S/S of infection  10/20/2022 2340 by Jenny Molina RN  Outcome: Progressing     Problem: Hematologic - Adult  Goal: Maintains hematologic stability  10/20/2022 2340 by Jenny Molina RN  Outcome: Progressing  Flowsheets (Taken 10/20/2022 2340)  Maintains hematologic stability: Assess for signs and symptoms of bleeding or hemorrhage     Problem: Chronic Conditions and Co-morbidities  Goal: Patient's chronic conditions and co-morbidity symptoms are monitored and maintained or improved  Recent Flowsheet Documentation  Taken 10/20/2022 1747 by Ezekiel Harris 34 - Patient's Chronic Conditions and Co-Morbidity Symptoms are Monitored and Maintained or Improved:   Monitor and assess patient's chronic conditions and comorbid symptoms for stability, deterioration, or improvement   Collaborate with multidisciplinary team to address chronic and comorbid conditions and prevent exacerbation or deterioration   Update acute care plan with appropriate goals if chronic or comorbid symptoms are exacerbated and prevent overall improvement and discharge  Care plan reviewed with patient   Patient an verbalize understanding of the plan of care and contribute to goal setting.

## 2022-10-21 NOTE — DISCHARGE INSTRUCTIONS
Call 911 for chest pain. Followup appointment in 3-4ks, call office for specifics. Eunice Castillo MD, MD       Discharge Instructions for Femoral Heart Catheterization  Take it easy for 3-4 days, limit vigorous activity (contact sports) for 2 weeks  No driving for 2 days  No lifting over 5 lbs for 1 week, this is a half gallon of milk  May shower after 24 hours  May remove dressing and apply band-aid after 24 hours. Apply a clean band-aid daily for 5 days over the incision. No creams, ointments, or powders near site for 2 weeks. No hot tub, swimming or tub bath for 1 week  Gently clean your site daily using soap and water while standing in the shower. Dry thoroughly. 10. Monitor site for infection- redness, increased pain, hardness or drainage at site, elevated temperature, poor healing of incision, fever, chills. 11. If bleeding from incision, apply pressure and call 911 immediately.

## 2022-10-21 NOTE — PROGRESS NOTES
2039 pt received from Cardiac cath lab, via w/c. Pt awake and alert. Nitro gtt infusing at 20mcg/min. Pt states no pain. Spouse accompanied pt to room.

## 2022-10-21 NOTE — PLAN OF CARE
Problem: Cardiovascular - Adult  Goal: Maintains optimal cardiac output and hemodynamic stability  Outcome: Progressing  Goal: Absence of cardiac dysrhythmias or at baseline  Outcome: Progressing     Problem: Skin/Tissue Integrity - Adult  Goal: Skin integrity remains intact  Outcome: Progressing  Goal: Incisions, wounds, or drain sites healing without S/S of infection  Outcome: Progressing  Goal: Oral mucous membranes remain intact  Outcome: Progressing     Problem: Hematologic - Adult  Goal: Maintains hematologic stability  Outcome: Progressing

## 2022-10-21 NOTE — CARE COORDINATION
10/21/22, 11:48 AM EDT    Patient goals/plan/ treatment preferences discussed by  and . Patient goals/plan/ treatment preferences reviewed with patient/ family. Patient/ family verbalize understanding of discharge plan and are in agreement with goal/plan/treatment preferences. Understanding was demonstrated using the teach back method. AVS provided by RN at time of discharge, which includes all necessary medical information pertaining to the patients current course of illness, treatment, post-discharge goals of care, and treatment preferences. Services At/After Discharge: None    Met with patient and wife briefly. They deny having any needs for discharge.

## 2022-10-22 NOTE — DISCHARGE SUMMARY
[address] [place] [address]                               DISCHARGE SUMMARY    PATIENT NAME: Delonte Leiaj                  :        1960  MED REC NO:   [id number]                           ROOM:       0017  ACCOUNT NO:   [de-identified]                           ADMIT DATE: 10/20/2022  PROVIDER:     CEASAR Cho Keila: 10/21/2022    FINAL DIAGNOSES:  1. Crescendo angina pectoris. 2.  Atherosclerotic coronary artery disease with history of CABG. 3.  History of hypertension. 4.  Excessive tobacco use. 5.  Diabetes mellitus. 6.  Dyslipidemia. 7.  Abnormal nuclear stress test.    PROCEDURE PERFORMED DURING HOSPITALIZATION:  1. Left heart cath, left ventriculogram, selective coronary angiogram.  2.  Saphenous vein graft visualization. 3.  LIMA visualization. 4.  Complex angioplasty and the stenting of the circumflex artery  utilizing Resolute drug-eluting stent x3. BRIEF HISTORY:  The patient is a 68-year-old gentleman with history of  coronary artery disease, history of CABG. This patient presented with  the complaint of shortness of breath and the chest pain and the stress  test was abnormal.  The patient was treated medically, continued to be  symptomatic. The patient had multiple medical problems. The patient  had excessive tobacco use, not interested in quitting. The patient had  history of hypercholesterolemia. For the rest of the H and P, please  refer to the consultation note. HOSPITALIZATION COURSE:  The patient underwent a heart cath and  subsequent intervention of the circumflex artery and the obtuse  marginal.    The patient tolerated the procedure well. The patient had developed the  chest pain after the procedure. Nitroglycerin was started. The  patient's symptoms subsided and the patient was given the aspirin and  Plavix.   The patient's home medication continued, was placed on a  sliding scale. The patient monitored overnight, continued to be stable. No recurrent chest pain, ambulating. He was advised about risk factor  modification, periodic followup, smoke cessation. He was advised about  cardiac rehab. He will be seen in the office. He might be considered  for intervention of the ostium of the LAD to the diagonal artery that  has not been bypassed. The patient has mild systolic dysfunction of the  left ventricle. This will be monitored closely. The medications will be adjusted accordingly. He was discharged home in  a stable condition.         Jae Xavier M.D.    D: 10/21/2022 13:09:14       T: 10/21/2022 15:31:52     [email or web address]  Job#: [id number]     Doc#: [id number]    CC:

## 2022-10-24 ENCOUNTER — TELEPHONE (OUTPATIENT)
Dept: FAMILY MEDICINE CLINIC | Age: 62
End: 2022-10-24

## 2022-10-24 NOTE — TELEPHONE ENCOUNTER
[facility] Initial Follow Up Call    Outreach made within 2 business days of discharge: Yes    Patient: Rasheeda Segundo Patient : 1960   MRN: [id number]  Reason for Admission: There are no discharge diagnoses documented for the most recent discharge. Discharge Date: 10/21/22       Spoke with: patient    Discharge department/facility: [facility]    TCM Interactive Patient Contact:  Was patient able to fill all prescriptions: N/A  Was patient instructed to bring all medications to the follow-up visit: Yes  Is patient taking all medications as directed in the discharge summary?  Yes  Does patient understand their discharge instructions: Yes  Does patient have questions or concerns that need addressed prior to 7-14 day follow up office visit: no    Scheduled appointment with PCP within 7-14 days    Follow Up  Future Appointments   Date Time Provider Mendy Chaidez   2022  2:00 PM [address], MD [facility]   12/15/2022  9:45 AM Kevin Hanson MD N [facility] - Mary Iraheta LPN

## 2022-10-25 NOTE — PROCEDURES
[address] [place] [address]                            CARDIAC CATHETERIZATION    PATIENT NAME: Jed Stahl                  :        1960  MED REC NO:   [id number]                           ROOM:       0017  ACCOUNT NO:   [de-identified]                           ADMIT DATE: 10/20/2022  PROVIDER:     Sky Ndiaye. Pennie Jewell M.D.    Coleman Silvary:  10/20/2022    CARDIAC CATH REPORT AND INTERVENTIONAL PROCEDURE    INDICATION FOR PROCEDURE:  This is a 77-year-old gentleman who is known  to have a history of coronary artery disease, he has a history of CABG. The patient recently has been complaining of recurrent episodes of  shortness of breath and chest pain. The patient did have the stress  test, was abnormal.  He was treated medically, continued to have the  angina pectoris, class III, admitted for heart cath possible  intervention. The patient has a history of tobacco use, hypertension,  hypercholesterolemia, diabetes mellitus. He had a history of sleep apnea. PROCEDURES PERFORMED:  1.  IV conscious sedation. The patient was given IV conscious sedation  in increment dosages by circulating cath lab RN, monitored by the cath  lab monitor tech under my supervision. The procedure started at 9 a.m.  and finished at 10:20 a.m. Estimated blood loss about 25 mL. 2.  Left heart cath: The right femoral artery was cannulated with the  6-[place] sheath. The coronary angiogram showed the left main is short  and patent, bifurcates to the LAD and circumflex. There is about 50%  narrowing at the ostium of the LAD, about 85% to 90% narrowing of the  mid LAD, therefore the origin of the moderately large diagonal branch  and before the origin of septal  branches. The LAD is totally  occluded in its mid course.   The stented area of the circumflex is still  patent with a severe stenosis of the mid circumflex, subtotal stenosis  at the ostium and the proximal portion of the moderate large obtuse  marginal branch, severe stenosis of the distal circumflex at the point  of bifurcation. The LIMA to the LAD was patent with a good distal runoff. The RCA is totally occluded proximally. The saphenous vein graft to the RCA was patent, but the PDA of the RCA  does exhibit about 70% stenosis distal to the point of the anastomosis. It is about 2 mm in diameter. The PDA is small and was patent. There is left-to-right collaterals. The left ventriculogram showed diffuse hypokinesia of the left ventricle  and anterolateral wall hypokinesia, and ejection fraction about 40%. No  mitral regurg. No gradient across the aortic valve. Left ventricular  end-diastolic pressure was 17.    3. Intervention of the circumflex. The left main was then cannulated  with the 7-Divehi 3.5 left Voda guide catheter. A Whisper wire was placed into the obtuse marginal, another Whisper wire  was placed into the circumflex artery. The patient has sluggish flow of  the OM after placement of the wires. The mid circumflex and the obtuse  marginal ostium were predilated, then we utilized 2.5 x 18 mm Resolute  drug-eluting stent to the obtuse marginal and 2.5 x 15 mm Resolute  drug-eluting stent to the circumflex, both stents were placed at the  same time, deployed at 16 atmospheric pressure to 25 seconds. There was  reduction of stenosis to a 0% JOSE-3 flow. There was some haziness in the mid circumflex. We placed another stent  in the mid circumflex, 3.0 Resolute drug-eluting stent. Overlapping an  old stent and obtuse marginal stent. This compromised the flow into the  distal circumflex. The distal circumflex was then re-cannulated with  the Whisper wire and then with the [facility] wire and predilated utilizing  pushing balloon.   We were able to establish JOSE-2 flow into the obtuse  marginal.    Angiogram of the right iliac artery showed patent right iliac artery  with a good distal runoff and successful deployment of the Angio-Seal  closure device. IMPRESSION:  1. Dilated left ventricle, anterolateral wall hypokinesia and ejection  fraction about 40%. Left ventricular end-diastolic pressure was 17  mmHg. 2.  Severe stenosis of the proximal RCA followed by total occlusion of  the RCA. 3.  Saphenous vein graft to the RCA was patent with the severe stenosis  of the PLV just distal to the site of the anastomosis, about 2 mm  artery. PDA is small and patent. 4.  Left-to-right collaterals. 5.  Patent left main. 6.  Severe stenosis at the ostium of the obtuse marginal of the  circumflex, _____ plaque of the mid circumflex, severe stenosis of the  distal circumflex at the point of bifurcation. 7.  Moderate disease at the ostium of the LAD and severe stenosis of the  proximal LAD, total occlusion of the mid LAD. 8.  LIMA to the LAD was patent, good distal runoff. 9.  Complex angioplasty and stenting of mid circumflex of the obtuse  marginal and distal circumflex utilizing Resolute drug-eluting stents,  reducing the stenosis from 90% to 0% into the OM. 10.  JOSE-2 flow in the distal circumflex. 11.  Successful deployment of the Angio-Seal closure device. RECOMMENDATIONS:  At this point, we recommend to continue aggressive  medical treatment, risk factor modification and periodic followup. The patient needs to be on dual antiplatelet treatments. The patient  could be considered for intervention of the ostium and the proximal LAD  to supply large septal and diagonal branch. The patient will be  monitored overnight.         Jeremias Lanier M.D.    D: 10/25/2022 12:50:34       T: 10/25/2022 14:43:06     AS/LILIANA  Job#: [id number]     Doc#: [id number]    CC:

## 2022-10-29 ENCOUNTER — APPOINTMENT (OUTPATIENT)
Dept: GENERAL RADIOLOGY | Age: 62
DRG: 201 | End: 2022-10-29
Payer: MEDICAID

## 2022-10-29 ENCOUNTER — HOSPITAL ENCOUNTER (INPATIENT)
Age: 62
LOS: 2 days | Discharge: HOME | DRG: 201 | End: 2022-10-31
Attending: EMERGENCY MEDICINE | Admitting: INTERNAL MEDICINE
Payer: MEDICAID

## 2022-10-29 DIAGNOSIS — R57.0 CARDIOGENIC SHOCK (HCC): ICD-10-CM

## 2022-10-29 DIAGNOSIS — I21.4 NSTEMI (NON-ST ELEVATED MYOCARDIAL INFARCTION) (HCC): ICD-10-CM

## 2022-10-29 DIAGNOSIS — I47.20 VENTRICULAR TACHYCARDIA: Primary | ICD-10-CM

## 2022-10-29 DIAGNOSIS — E78.00 PURE HYPERCHOLESTEROLEMIA: ICD-10-CM

## 2022-10-29 PROBLEM — I95.9 HYPOTENSION: Status: ACTIVE | Noted: 2022-10-29

## 2022-10-29 LAB
ALBUMIN SERPL-MCNC: 3.6 G/DL (ref 3.5–5.1)
ALP BLD-CCNC: 186 U/L (ref 38–126)
ALT SERPL-CCNC: 570 U/L (ref 11–66)
ANION GAP SERPL CALCULATED.3IONS-SCNC: 24 MEQ/L (ref 8–16)
APTT: 28.3 SECONDS (ref 22–38)
AST SERPL-CCNC: 345 U/L (ref 5–40)
AVERAGE GLUCOSE: 201 MG/DL (ref 70–126)
BASOPHILS # BLD: 0.2 %
BASOPHILS # BLD: 0.3 %
BASOPHILS ABSOLUTE: 0 THOU/MM3 (ref 0–0.1)
BASOPHILS ABSOLUTE: 0 THOU/MM3 (ref 0–0.1)
BILIRUB SERPL-MCNC: 0.4 MG/DL (ref 0.3–1.2)
BILIRUBIN DIRECT: < 0.2 MG/DL (ref 0–0.3)
BILIRUBIN URINE: NEGATIVE
BLOOD, URINE: NEGATIVE
BUN BLDV-MCNC: 41 MG/DL (ref 7–22)
CALCIUM IONIZED: 1.12 MMOL/L (ref 1.12–1.32)
CALCIUM SERPL-MCNC: 10.1 MG/DL (ref 8.5–10.5)
CHARACTER, URINE: CLEAR
CHLORIDE BLD-SCNC: 90 MEQ/L (ref 98–111)
CO2: 12 MEQ/L (ref 23–33)
COLOR: YELLOW
CREAT SERPL-MCNC: 1.3 MG/DL (ref 0.4–1.2)
EOSINOPHIL # BLD: 0 %
EOSINOPHIL # BLD: 0.3 %
EOSINOPHILS ABSOLUTE: 0 THOU/MM3 (ref 0–0.4)
EOSINOPHILS ABSOLUTE: 0 THOU/MM3 (ref 0–0.4)
ERYTHROCYTE [DISTWIDTH] IN BLOOD BY AUTOMATED COUNT: 14 % (ref 11.5–14.5)
ERYTHROCYTE [DISTWIDTH] IN BLOOD BY AUTOMATED COUNT: 14.2 % (ref 11.5–14.5)
ERYTHROCYTE [DISTWIDTH] IN BLOOD BY AUTOMATED COUNT: 49.1 FL (ref 35–45)
ERYTHROCYTE [DISTWIDTH] IN BLOOD BY AUTOMATED COUNT: 50.7 FL (ref 35–45)
GFR SERPL CREATININE-BSD FRML MDRD: > 60 ML/MIN/1.73M2
GLUCOSE BLD-MCNC: 184 MG/DL (ref 70–108)
GLUCOSE BLD-MCNC: 195 MG/DL (ref 70–108)
GLUCOSE BLD-MCNC: 250 MG/DL (ref 70–108)
GLUCOSE BLD-MCNC: 272 MG/DL (ref 70–108)
GLUCOSE BLD-MCNC: 319 MG/DL (ref 70–108)
GLUCOSE URINE: >= 1000 MG/DL
HBA1C MFR BLD: 8.7 % (ref 4.4–6.4)
HCT VFR BLD CALC: 37.4 % (ref 42–52)
HCT VFR BLD CALC: 41.2 % (ref 42–52)
HEMOGLOBIN: 12.5 GM/DL (ref 14–18)
HEMOGLOBIN: 13.4 GM/DL (ref 14–18)
HEPARIN UNFRACTIONATED: 0.16 U/ML (ref 0.3–0.7)
HEPARIN UNFRACTIONATED: 0.18 U/ML (ref 0.3–0.7)
IMMATURE GRANS (ABS): 0.05 THOU/MM3 (ref 0–0.07)
IMMATURE GRANS (ABS): 0.11 THOU/MM3 (ref 0–0.07)
IMMATURE GRANULOCYTES: 0.5 %
IMMATURE GRANULOCYTES: 1 %
INR BLD: 1.35 (ref 0.85–1.13)
KETONES, URINE: ABNORMAL
LACTIC ACID: 2.1 MMOL/L (ref 0.5–2)
LEUKOCYTE ESTERASE, URINE: NEGATIVE
LV EF: 28 %
LVEF MODALITY: NORMAL
LYMPHOCYTES # BLD: 14 %
LYMPHOCYTES # BLD: 16.8 %
LYMPHOCYTES ABSOLUTE: 1.6 THOU/MM3 (ref 1–4.8)
LYMPHOCYTES ABSOLUTE: 1.6 THOU/MM3 (ref 1–4.8)
MAGNESIUM: 2.5 MG/DL (ref 1.6–2.4)
MCH RBC QN AUTO: 32.2 PG (ref 26–33)
MCH RBC QN AUTO: 32.3 PG (ref 26–33)
MCHC RBC AUTO-ENTMCNC: 32.5 GM/DL (ref 32.2–35.5)
MCHC RBC AUTO-ENTMCNC: 33.4 GM/DL (ref 32.2–35.5)
MCV RBC AUTO: 96.4 FL (ref 80–94)
MCV RBC AUTO: 99.3 FL (ref 80–94)
MONOCYTES # BLD: 5.9 %
MONOCYTES # BLD: 8.3 %
MONOCYTES ABSOLUTE: 0.6 THOU/MM3 (ref 0.4–1.3)
MONOCYTES ABSOLUTE: 1 THOU/MM3 (ref 0.4–1.3)
MRSA SCREEN RT-PCR: NEGATIVE
NITRITE, URINE: NEGATIVE
NUCLEATED RED BLOOD CELLS: 0 /100 WBC
NUCLEATED RED BLOOD CELLS: 0 /100 WBC
OSMOLALITY CALCULATION: 275.7 MOSMOL/KG (ref 275–300)
PH UA: 5 (ref 5–9)
PHOSPHORUS: 7.3 MG/DL (ref 2.4–4.7)
PLATELET # BLD: 271 THOU/MM3 (ref 130–400)
PLATELET # BLD: 309 THOU/MM3 (ref 130–400)
PMV BLD AUTO: 10.1 FL (ref 9.4–12.4)
PMV BLD AUTO: 10.3 FL (ref 9.4–12.4)
POTASSIUM REFLEX MAGNESIUM: 5 MEQ/L (ref 3.5–5.2)
PRO-BNP: ABNORMAL PG/ML (ref 0–900)
PROTEIN UA: NEGATIVE
RBC # BLD: 3.88 MILL/MM3 (ref 4.7–6.1)
RBC # BLD: 4.15 MILL/MM3 (ref 4.7–6.1)
SEG NEUTROPHILS: 76.2 %
SEG NEUTROPHILS: 76.5 %
SEGMENTED NEUTROPHILS ABSOLUTE COUNT: 7.3 THOU/MM3 (ref 1.8–7.7)
SEGMENTED NEUTROPHILS ABSOLUTE COUNT: 8.8 THOU/MM3 (ref 1.8–7.7)
SODIUM BLD-SCNC: 126 MEQ/L (ref 135–145)
SPECIFIC GRAVITY, URINE: > 1.03 (ref 1–1.03)
TOTAL PROTEIN: 6.1 G/DL (ref 6.1–8)
TROPONIN T: 0.46 NG/ML
TROPONIN T: 0.75 NG/ML
UROBILINOGEN, URINE: 0.2 EU/DL (ref 0–1)
VANCOMYCIN RESISTANT ENTEROCOCCUS: NEGATIVE
WBC # BLD: 11.5 THOU/MM3 (ref 4.8–10.8)
WBC # BLD: 9.6 THOU/MM3 (ref 4.8–10.8)

## 2022-10-29 PROCEDURE — 2580000003 HC RX 258: Performed by: EMERGENCY MEDICINE

## 2022-10-29 PROCEDURE — 84484 ASSAY OF TROPONIN QUANT: CPT

## 2022-10-29 PROCEDURE — 6370000000 HC RX 637 (ALT 250 FOR IP): Performed by: STUDENT IN AN ORGANIZED HEALTH CARE EDUCATION/TRAINING PROGRAM

## 2022-10-29 PROCEDURE — 36415 COLL VENOUS BLD VENIPUNCTURE: CPT

## 2022-10-29 PROCEDURE — 71045 X-RAY EXAM CHEST 1 VIEW: CPT

## 2022-10-29 PROCEDURE — 6370000000 HC RX 637 (ALT 250 FOR IP): Performed by: EMERGENCY MEDICINE

## 2022-10-29 PROCEDURE — 5A2204Z RESTORATION OF CARDIAC RHYTHM, SINGLE: ICD-10-PCS

## 2022-10-29 PROCEDURE — 80076 HEPATIC FUNCTION PANEL: CPT

## 2022-10-29 PROCEDURE — 85610 PROTHROMBIN TIME: CPT

## 2022-10-29 PROCEDURE — 96375 TX/PRO/DX INJ NEW DRUG ADDON: CPT

## 2022-10-29 PROCEDURE — 2000000000 HC ICU R&B

## 2022-10-29 PROCEDURE — 85520 HEPARIN ASSAY: CPT

## 2022-10-29 PROCEDURE — 87641 MR-STAPH DNA AMP PROBE: CPT

## 2022-10-29 PROCEDURE — 83605 ASSAY OF LACTIC ACID: CPT

## 2022-10-29 PROCEDURE — 02HV33Z INSERTION OF INFUSION DEVICE INTO SUPERIOR VENA CAVA, PERCUTANEOUS APPROACH: ICD-10-PCS

## 2022-10-29 PROCEDURE — 87500 VANOMYCIN DNA AMP PROBE: CPT

## 2022-10-29 PROCEDURE — 99285 EMERGENCY DEPT VISIT HI MDM: CPT

## 2022-10-29 PROCEDURE — 83880 ASSAY OF NATRIURETIC PEPTIDE: CPT

## 2022-10-29 PROCEDURE — 2500000003 HC RX 250 WO HCPCS: Performed by: EMERGENCY MEDICINE

## 2022-10-29 PROCEDURE — 93005 ELECTROCARDIOGRAM TRACING: CPT | Performed by: EMERGENCY MEDICINE

## 2022-10-29 PROCEDURE — 82330 ASSAY OF CALCIUM: CPT

## 2022-10-29 PROCEDURE — 84100 ASSAY OF PHOSPHORUS: CPT

## 2022-10-29 PROCEDURE — 85025 COMPLETE CBC W/AUTO DIFF WBC: CPT

## 2022-10-29 PROCEDURE — 82948 REAGENT STRIP/BLOOD GLUCOSE: CPT

## 2022-10-29 PROCEDURE — 96374 THER/PROPH/DIAG INJ IV PUSH: CPT

## 2022-10-29 PROCEDURE — 83735 ASSAY OF MAGNESIUM: CPT

## 2022-10-29 PROCEDURE — 6360000002 HC RX W HCPCS: Performed by: EMERGENCY MEDICINE

## 2022-10-29 PROCEDURE — 93307 TTE W/O DOPPLER COMPLETE: CPT

## 2022-10-29 PROCEDURE — 81003 URINALYSIS AUTO W/O SCOPE: CPT

## 2022-10-29 PROCEDURE — 99223 1ST HOSP IP/OBS HIGH 75: CPT | Performed by: INTERNAL MEDICINE

## 2022-10-29 PROCEDURE — 6360000002 HC RX W HCPCS

## 2022-10-29 PROCEDURE — 83036 HEMOGLOBIN GLYCOSYLATED A1C: CPT

## 2022-10-29 PROCEDURE — 80048 BASIC METABOLIC PNL TOTAL CA: CPT

## 2022-10-29 PROCEDURE — 85730 THROMBOPLASTIN TIME PARTIAL: CPT

## 2022-10-29 PROCEDURE — 92960 CARDIOVERSION ELECTRIC EXT: CPT

## 2022-10-29 RX ORDER — ACETAMINOPHEN 325 MG/1
650 TABLET ORAL EVERY 6 HOURS PRN
Status: DISCONTINUED | OUTPATIENT
Start: 2022-10-29 | End: 2022-10-31 | Stop reason: HOSPADM

## 2022-10-29 RX ORDER — SODIUM CHLORIDE 9 MG/ML
INJECTION, SOLUTION INTRAVENOUS PRN
Status: DISCONTINUED | OUTPATIENT
Start: 2022-10-29 | End: 2022-10-31 | Stop reason: HOSPADM

## 2022-10-29 RX ORDER — NOREPINEPHRINE BIT/0.9 % NACL 16MG/250ML
1-100 INFUSION BOTTLE (ML) INTRAVENOUS CONTINUOUS
Status: DISCONTINUED | OUTPATIENT
Start: 2022-10-29 | End: 2022-10-30

## 2022-10-29 RX ORDER — ASPIRIN 81 MG/1
81 TABLET, CHEWABLE ORAL DAILY
Status: DISCONTINUED | OUTPATIENT
Start: 2022-10-30 | End: 2022-10-29

## 2022-10-29 RX ORDER — ASPIRIN 325 MG
325 TABLET ORAL DAILY
Status: DISCONTINUED | OUTPATIENT
Start: 2022-10-30 | End: 2022-10-31 | Stop reason: HOSPADM

## 2022-10-29 RX ORDER — DEXTROSE MONOHYDRATE 100 MG/ML
INJECTION, SOLUTION INTRAVENOUS CONTINUOUS PRN
Status: DISCONTINUED | OUTPATIENT
Start: 2022-10-29 | End: 2022-10-31 | Stop reason: SDUPTHER

## 2022-10-29 RX ORDER — ASPIRIN 81 MG/1
324 TABLET, CHEWABLE ORAL ONCE
Status: COMPLETED | OUTPATIENT
Start: 2022-10-29 | End: 2022-10-29

## 2022-10-29 RX ORDER — FENTANYL CITRATE 50 UG/ML
50 INJECTION, SOLUTION INTRAMUSCULAR; INTRAVENOUS ONCE
Status: COMPLETED | OUTPATIENT
Start: 2022-10-29 | End: 2022-10-29

## 2022-10-29 RX ORDER — ONDANSETRON 4 MG/1
4 TABLET, ORALLY DISINTEGRATING ORAL EVERY 8 HOURS PRN
Status: DISCONTINUED | OUTPATIENT
Start: 2022-10-29 | End: 2022-10-31 | Stop reason: HOSPADM

## 2022-10-29 RX ORDER — SODIUM CHLORIDE 0.9 % (FLUSH) 0.9 %
5-40 SYRINGE (ML) INJECTION EVERY 12 HOURS SCHEDULED
Status: DISCONTINUED | OUTPATIENT
Start: 2022-10-29 | End: 2022-10-31 | Stop reason: HOSPADM

## 2022-10-29 RX ORDER — HEPARIN SODIUM 1000 [USP'U]/ML
4000 INJECTION, SOLUTION INTRAVENOUS; SUBCUTANEOUS PRN
Status: DISCONTINUED | OUTPATIENT
Start: 2022-10-29 | End: 2022-10-30

## 2022-10-29 RX ORDER — MAGNESIUM SULFATE IN WATER 40 MG/ML
2000 INJECTION, SOLUTION INTRAVENOUS PRN
Status: DISCONTINUED | OUTPATIENT
Start: 2022-10-29 | End: 2022-10-31 | Stop reason: HOSPADM

## 2022-10-29 RX ORDER — HEPARIN SODIUM 10000 [USP'U]/100ML
5-30 INJECTION, SOLUTION INTRAVENOUS CONTINUOUS
Status: DISCONTINUED | OUTPATIENT
Start: 2022-10-29 | End: 2022-10-30 | Stop reason: ALTCHOICE

## 2022-10-29 RX ORDER — FENTANYL CITRATE 50 UG/ML
INJECTION, SOLUTION INTRAMUSCULAR; INTRAVENOUS
Status: COMPLETED
Start: 2022-10-29 | End: 2022-10-29

## 2022-10-29 RX ORDER — POTASSIUM CHLORIDE 7.45 MG/ML
10 INJECTION INTRAVENOUS PRN
Status: DISCONTINUED | OUTPATIENT
Start: 2022-10-29 | End: 2022-10-31 | Stop reason: HOSPADM

## 2022-10-29 RX ORDER — SODIUM CHLORIDE 0.9 % (FLUSH) 0.9 %
5-40 SYRINGE (ML) INJECTION PRN
Status: DISCONTINUED | OUTPATIENT
Start: 2022-10-29 | End: 2022-10-31 | Stop reason: HOSPADM

## 2022-10-29 RX ORDER — MIDAZOLAM HYDROCHLORIDE 1 MG/ML
1 INJECTION INTRAMUSCULAR; INTRAVENOUS ONCE
Status: COMPLETED | OUTPATIENT
Start: 2022-10-29 | End: 2022-10-29

## 2022-10-29 RX ORDER — MAGNESIUM SULFATE IN WATER 40 MG/ML
2000 INJECTION, SOLUTION INTRAVENOUS ONCE
Status: COMPLETED | OUTPATIENT
Start: 2022-10-29 | End: 2022-10-29

## 2022-10-29 RX ORDER — ATORVASTATIN CALCIUM 40 MG/1
40 TABLET, FILM COATED ORAL NIGHTLY
Status: DISCONTINUED | OUTPATIENT
Start: 2022-10-29 | End: 2022-10-29

## 2022-10-29 RX ORDER — ONDANSETRON 2 MG/ML
4 INJECTION INTRAMUSCULAR; INTRAVENOUS EVERY 6 HOURS PRN
Status: DISCONTINUED | OUTPATIENT
Start: 2022-10-29 | End: 2022-10-31 | Stop reason: HOSPADM

## 2022-10-29 RX ORDER — INSULIN LISPRO 100 [IU]/ML
0-4 INJECTION, SOLUTION INTRAVENOUS; SUBCUTANEOUS EVERY 4 HOURS
Status: DISCONTINUED | OUTPATIENT
Start: 2022-10-29 | End: 2022-10-30

## 2022-10-29 RX ORDER — CLOPIDOGREL BISULFATE 75 MG/1
75 TABLET ORAL DAILY
Status: DISCONTINUED | OUTPATIENT
Start: 2022-10-30 | End: 2022-10-31 | Stop reason: HOSPADM

## 2022-10-29 RX ORDER — HEPARIN SODIUM 1000 [USP'U]/ML
2000 INJECTION, SOLUTION INTRAVENOUS; SUBCUTANEOUS PRN
Status: DISCONTINUED | OUTPATIENT
Start: 2022-10-29 | End: 2022-10-30

## 2022-10-29 RX ORDER — HEPARIN SODIUM 1000 [USP'U]/ML
4000 INJECTION, SOLUTION INTRAVENOUS; SUBCUTANEOUS ONCE
Status: COMPLETED | OUTPATIENT
Start: 2022-10-29 | End: 2022-10-29

## 2022-10-29 RX ORDER — POTASSIUM CHLORIDE 29.8 MG/ML
20 INJECTION INTRAVENOUS PRN
Status: DISCONTINUED | OUTPATIENT
Start: 2022-10-29 | End: 2022-10-31 | Stop reason: HOSPADM

## 2022-10-29 RX ORDER — ATORVASTATIN CALCIUM 80 MG/1
80 TABLET, FILM COATED ORAL NIGHTLY
Status: DISCONTINUED | OUTPATIENT
Start: 2022-10-29 | End: 2022-10-30

## 2022-10-29 RX ORDER — POLYETHYLENE GLYCOL 3350 17 G/17G
17 POWDER, FOR SOLUTION ORAL DAILY PRN
Status: DISCONTINUED | OUTPATIENT
Start: 2022-10-29 | End: 2022-10-31 | Stop reason: HOSPADM

## 2022-10-29 RX ADMIN — ACETAMINOPHEN 650 MG: 325 TABLET ORAL at 20:41

## 2022-10-29 RX ADMIN — MIDAZOLAM 1 MG: 1 INJECTION INTRAMUSCULAR; INTRAVENOUS at 10:12

## 2022-10-29 RX ADMIN — ATORVASTATIN CALCIUM 80 MG: 80 TABLET, FILM COATED ORAL at 20:41

## 2022-10-29 RX ADMIN — HEPARIN SODIUM 4000 UNITS: 1000 INJECTION, SOLUTION INTRAVENOUS; SUBCUTANEOUS at 11:03

## 2022-10-29 RX ADMIN — INSULIN LISPRO 2 UNITS: 100 INJECTION, SOLUTION INTRAVENOUS; SUBCUTANEOUS at 13:56

## 2022-10-29 RX ADMIN — FENTANYL CITRATE 50 MCG: 50 INJECTION, SOLUTION INTRAMUSCULAR; INTRAVENOUS at 10:17

## 2022-10-29 RX ADMIN — ASPIRIN 81 MG CHEWABLE TABLET 324 MG: 81 TABLET CHEWABLE at 10:40

## 2022-10-29 RX ADMIN — Medication 5 MCG/MIN: at 11:08

## 2022-10-29 RX ADMIN — SODIUM CHLORIDE, PRESERVATIVE FREE 10 ML: 5 INJECTION INTRAVENOUS at 19:45

## 2022-10-29 RX ADMIN — SODIUM CHLORIDE, PRESERVATIVE FREE 20 ML: 5 INJECTION INTRAVENOUS at 16:12

## 2022-10-29 RX ADMIN — MAGNESIUM SULFATE HEPTAHYDRATE 2000 MG: 40 INJECTION, SOLUTION INTRAVENOUS at 11:02

## 2022-10-29 RX ADMIN — INSULIN LISPRO 2 UNITS: 100 INJECTION, SOLUTION INTRAVENOUS; SUBCUTANEOUS at 20:40

## 2022-10-29 RX ADMIN — HEPARIN SODIUM 11 UNITS/KG/HR: 10000 INJECTION, SOLUTION INTRAVENOUS at 11:06

## 2022-10-29 RX ADMIN — HEPARIN SODIUM 2000 UNITS: 1000 INJECTION, SOLUTION INTRAVENOUS; SUBCUTANEOUS at 17:40

## 2022-10-29 SDOH — ECONOMIC STABILITY: INCOME INSECURITY: IN THE LAST 12 MONTHS, WAS THERE A TIME WHEN YOU WERE NOT ABLE TO PAY THE MORTGAGE OR RENT ON TIME?: NO

## 2022-10-29 SDOH — HEALTH STABILITY: PHYSICAL HEALTH: ON AVERAGE, HOW MANY MINUTES DO YOU ENGAGE IN EXERCISE AT THIS LEVEL?: 0 MIN

## 2022-10-29 SDOH — HEALTH STABILITY: PHYSICAL HEALTH: ON AVERAGE, HOW MANY DAYS PER WEEK DO YOU ENGAGE IN MODERATE TO STRENUOUS EXERCISE (LIKE A BRISK WALK)?: 0 DAYS

## 2022-10-29 SDOH — ECONOMIC STABILITY: HOUSING INSECURITY
IN THE LAST 12 MONTHS, WAS THERE A TIME WHEN YOU DID NOT HAVE A STEADY PLACE TO SLEEP OR SLEPT IN A SHELTER (INCLUDING NOW)?: NO

## 2022-10-29 SDOH — ECONOMIC STABILITY: HOUSING INSECURITY: IN THE LAST 12 MONTHS, HOW MANY PLACES HAVE YOU LIVED?: 1

## 2022-10-29 ASSESSMENT — PAIN - FUNCTIONAL ASSESSMENT
PAIN_FUNCTIONAL_ASSESSMENT: NONE - DENIES PAIN
PAIN_FUNCTIONAL_ASSESSMENT: NONE - DENIES PAIN
PAIN_FUNCTIONAL_ASSESSMENT: ACTIVITIES ARE NOT PREVENTED

## 2022-10-29 ASSESSMENT — SOCIAL DETERMINANTS OF HEALTH (SDOH): HOW HARD IS IT FOR YOU TO PAY FOR THE VERY BASICS LIKE FOOD, HOUSING, MEDICAL CARE, AND HEATING?: NOT VERY HARD

## 2022-10-29 ASSESSMENT — PATIENT HEALTH QUESTIONNAIRE - PHQ9
1. LITTLE INTEREST OR PLEASURE IN DOING THINGS: NOT AT ALL
SUM OF ALL RESPONSES TO PHQ9 QUESTIONS 1 & 2: 0
2. FEELING DOWN, DEPRESSED OR HOPELESS: NOT AT ALL

## 2022-10-29 ASSESSMENT — PAIN DESCRIPTION - DESCRIPTORS
DESCRIPTORS: ACHING
DESCRIPTORS: SORE

## 2022-10-29 ASSESSMENT — ENCOUNTER SYMPTOMS
SHORTNESS OF BREATH: 1
VOMITING: 0
COUGH: 0
ABDOMINAL PAIN: 0
BACK PAIN: 0
DIARRHEA: 0
EYE REDNESS: 0
RHINORRHEA: 0

## 2022-10-29 ASSESSMENT — PAIN DESCRIPTION - ONSET: ONSET: ON-GOING

## 2022-10-29 ASSESSMENT — PAIN SCALES - GENERAL
PAINLEVEL_OUTOF10: 0
PAINLEVEL_OUTOF10: 3
PAINLEVEL_OUTOF10: 2
PAINLEVEL_OUTOF10: 1

## 2022-10-29 ASSESSMENT — PAIN DESCRIPTION - FREQUENCY: FREQUENCY: INTERMITTENT

## 2022-10-29 ASSESSMENT — PAIN DESCRIPTION - LOCATION
LOCATION: BACK
LOCATION: BACK

## 2022-10-29 ASSESSMENT — PAIN DESCRIPTION - ORIENTATION: ORIENTATION: LEFT

## 2022-10-29 ASSESSMENT — PAIN DESCRIPTION - PAIN TYPE: TYPE: ACUTE PAIN

## 2022-10-29 NOTE — PLAN OF CARE
Problem: Safety - Adult  Goal: Free from fall injury  Outcome: Progressing  Flowsheets (Taken 10/29/2022 1949)  Free From Fall Injury: Instruct family/caregiver on patient safety

## 2022-10-29 NOTE — H&P
CRITICAL CARE- History & Physical      Patient: Lexie Cazares    Unit/Bed:01/001A  YOB: 1960  MRN: [id number]   Acct: [de-identified]   PCP: Rosa Elena White DO    Date of Service: Pt seen/examined on 10/29/22  and Admitted to Inpatient with expected LOS greater than two midnights due to medical therapy. Chief Complaint:  Shortness of breath, jaw pain. Assessment and Plan:-  Ventricular tachycardia: s/p electrical cardioversion at 160J + amiodarone. Patient arrived at the ED with SOB, jaw pain, v. tach on tele. Amiodarone held due to NSR and hypotension. Hypotension: patient is hypotensive post-cardioversion. Currently on norepinephrine drip. Cardiology will follow. On arrival at the ICU, patient's blood pressure is normalizing. Will wean pressors as tolerated. CAD: s/p CABG, PCI Patient takes aspirin, Plavix. Held. patient was placed on heparin drip for cardioversion. Systolic HFrEF: today's EF 25-35% on ECHO vs. 7/2022 EF 30-35%, severe global hypokinesis of LV. Severely reduced systolic function. Patient is taking BB, statin, ARB. Pressure lowering drugs held. Hypertension: patient takes amlodipine 5mg, metoprolol 50 mg (1/2 daily). Held for hypotension. Hyperlipidemia: atorvastatin resumed. DM2: Synjardy (empagliflozin-metformin) at home. POC glucose checks, SSI ordered. History Of Present Illness: Sabrina Lamb is a 75-year-old male current smoker with a past medical history of CAD status post CABG, CHF, COPD, diabetes, hyperlipidemia who presented to the emergency department complaining of shortness of breath and chest pain. He was in V. tach on telemetry, cardioverted at 160J. Successful. Right IJ placed for administration of norepinephrine. He is status post cardiac catheterization on 10/21/2022 with a drug-eluting stent placed in the left circumflex artery.  There was a concern for possible reocclusion, cardiologist consulted and recommends medical management at this time. ICU admission for pressors. Past Medical History:        Diagnosis Date    CAD (coronary artery disease)     CHF (congestive heart failure) ([facility])     COPD (chronic obstructive pulmonary disease) ([facility])     Diabetes mellitus ([address]     Hyperlipidemia     PONV (postoperative nausea and vomiting)     Sleep apnea     has CPAP doesn't wear       Past Surgical History:        Procedure Laterality Date    APPENDECTOMY      CARDIAC CATHETERIZATION  9/2/11    CARDIAC CATHETERIZATION  6/12/14    [facility]    COLONOSCOPY      CORONARY ANGIOPLASTY WITH STENT PLACEMENT  3/12/10    [facility]    CORONARY ARTERY BYPASS GRAFT  2014    [facility]    VASCULAR SURGERY         Home Medications:   No current facility-administered medications on file prior to encounter. Current Outpatient Medications on File Prior to Encounter   Medication Sig Dispense Refill    amLODIPine (NORVASC) 5 MG tablet Take 1 tablet by mouth daily 30 tablet 3    metoprolol succinate (TOPROL XL) 50 MG extended release tablet Take 0.5 tablets by mouth every morning 45 tablet 3    atorvastatin (LIPITOR) 80 MG tablet Take 1 tablet by mouth nightly 90 tablet 3    clopidogrel (PLAVIX) 75 MG tablet Take 1 tablet by mouth daily 90 tablet 3    nitroGLYCERIN (NITROSTAT) 0.4 MG SL tablet Place 1 tablet under the tongue every 5 minutes as needed for Chest pain up to max of 3 total doses.  If no relief after 1 dose, call 911. 25 tablet 3    losartan (COZAAR) 50 MG tablet Take 1 tablet by mouth daily 30 tablet 11    nitroGLYCERIN (NITROSTAT) 0.4 MG SL tablet Place 1 tablet under the tongue every 5 minutes as needed for Chest pain 25 tablet 3    Empagliflozin-metFORMIN HCl (SYNJARDY) 5-1000 MG TABS Take 1 tablet BID 60 tablet 5    aspirin 325 MG tablet Take 325 mg by mouth daily      glucose blood VI test strips (FREESTYLE LITE) strip TEST twice a day 100 strip 3    FREESTYLE LANCETS MISC TEST twice a day 100 each 3       Allergies:    Patient has no known allergies. Social History:    reports that he has been smoking cigarettes. He has a 32.00 pack-year smoking history. He has never used smokeless tobacco. He reports that he does not drink alcohol and does not use drugs. Family History:       Problem Relation Age of Onset    High Blood Pressure Mother     Heart Disease Father     High Blood Pressure Sister     Heart Disease Brother     Heart Disease Brother     Diabetes Paternal Cousin     Cancer Neg Hx     Stroke Neg Hx        Diet:  Diet NPO    Review of systems:   Pertinent positives as noted in the HPI. All other systems reviewed and negative. PHYSICAL EXAMINATION:  Patient Vitals for the past 8 hrs:   BP Temp Temp src Pulse Resp SpO2 Weight   10/29/22 1132 95/74 -- -- 79 20 99 % --   10/29/22 1054 (!) 75/59 -- -- 82 22 99 % --   10/29/22 1041 91/77 -- -- -- -- -- --   10/29/22 1036 94/67 -- -- 83 16 100 % --   10/29/22 1023 (!) 180/163 (!) 96.3 °F (35.7 °C) Axillary 86 16 100 % --   10/29/22 1020 -- -- -- 83 19 -- 190 lb (86.2 kg)   10/29/22 1019 (!) 172/157 -- -- (!) 150 -- -- --   10/29/22 1012 (!) 160/126 -- -- (!) 151 -- -- --     No intake/output data recorded. Wt Readings from Last 3 Encounters:   10/29/22 190 lb (86.2 kg)   10/20/22 190 lb (86.2 kg)   10/05/22 197 lb 6.4 oz (89.5 kg)      Body mass index is 27.26 kg/m². General:   Patient is laying in ICU bed, head elevated, nasal canula in place. No acute distress. HEENT:  normocephalic and atraumatic. No scleral icterus. PERR  Neck: supple. No Thyromegaly. Lungs: clear to auscultation. No retractions  Cardiac: RRR. No JVD. Abdomen: Mild distention. Nontender. Extremities:  No clubbing, cyanosis, pitting edema around the ankles. Vasculature: capillary refill > 3 seconds. Non Palpable dorsalis pedis pulses. Present on Doppler  Skin:  warm and dry. Cool lower extremities, pale. Psych:  Alert and oriented x3.   Affect appropriate  Lymph:  No supraclavicular adenopathy. Neurologic:  No focal deficit. No seizures. Data: (All radiographs, tracings, PFTs, and imaging are personally viewed and interpreted unless otherwise noted). CXR 10/29/22  FINDINGS: The tip of a new right-sided internal jugular central venous line overlies the right superior mediastinum, likely in the superior vena cava. The wire is no longer visualized. Median sternotomy wires are again noted. There is moderate stable enlargement of the cardiac silhouette. The left costophrenic angle is excluded from the submitted image. No lung consolidations are identified. Degenerative changes in the thoracic spine are poorly visualized.      CURRENT PARENTERAL VASOACTIVE / INOTROPIC AGENTS:  [] None Vasopressors:  [x] Norepinephrine  [] Dopamine  [] Phenylephrine  [] Vasopressin  [] Epinephrine  [] Other: Sedation:  [] No Sedation  [] Propofol gtt-    [] BZD gtt -    [] Opiate gtt  -    [] Dexmedetomidine  [] Paralytics Antihypertensives gtt  [] Ca Channel Antagonist:  [] Beta-blocker:  [] Nitroglycerin  [] Nitroprusside     SUPPORT DEVICES: Nasal cannula   [] ETT   MODE:-  []PRVC           []CPAP             []BILEVEL-PAP   FiO2 25%,      Additional Respiratory Assessments  Heart Rate: 79  Resp: 20  SpO2: 99 %  Oxygen Delivery - O2 Flow Rate (L/min): 2 L/min  ABGs:   Lab Results   Component Value Date/Time    PH 7.29 06/23/2014 02:53 PM    PCO2 42 06/23/2014 02:53 PM    PO2 70 06/23/2014 02:53 PM    HCO3 20 06/23/2014 02:53 PM    O2SAT 92 06/23/2014 02:53 PM     Lab Results   Component Value Date/Time    IFIO2 40 06/23/2014 02:53 PM    MODE CPAP+PS 06/23/2014 02:53 PM    SETTIDVOL 770.0 06/23/2014 12:12 PM    SETPEEP 5 06/23/2014 02:53 PM         CENTRAL LINES/CHEMOPORT/TUNNEL CATH:-    [] No   [] Yes   (Date )           If yes -    [x] Right IJ   [] Left IJ [] Right Femoral [] Left Femoral     [] Right Subclavian [] Left Subclavian  [x] Peripheral IV access  [] Arterial Line (Specify Site)    NATHAN CATHETER:-   [x] No  [] Yes  (Date  )     ICU PROPHYLAXIS/THERAPY:   Stress ulcer:  [] PPI Agent  [] H2RA [] Sucralfate [] Other:   VTE:     [] Enoxaparin    [] Warfarin [] NOAC [x] PCD Device:Bilat LE   [x] Heparin: [] Subcut / [x] IV     LABS/RADIOLOGY:-  Recent Labs     10/29/22  1039   WBC 11.5*   HGB 13.4*   HCT 41.2*        Recent Labs     10/29/22  1039   *   K 5.0   CL 90*   CO2 12*   BUN 41*   CREATININE 1.3*   CALCIUM 10.1     No results for input(s): AST, ALT, BILIDIR, BILITOT, ALKPHOS in the last 72 hours. Recent Labs     10/29/22  1039   INR 1.35*     No results for input(s): Marvie Forget in the last 72 hours. No results for input(s): PROCAL in the last 72 hours. No results for input(s): LACTA in the last 72 hours. Microbiology:    Blood culture #1: No results found for: BC  Blood culture #2:No results found for: Alea Downs  Organism:No results found for: ORG    No results found for: LABGRAM  MRSA culture only:No results found for: [facility]  Urine culture: No results found for: LABURIN  Respiratory culture: No results found for: CULTRESP  Aerobic and Anaerobic :  No results found for: LABAERO  No results found for: LABANAE    Urinalysis:      Lab Results   Component Value Date/Time    NITRU NEGATIVE 06/19/2014 07:50 AM    BLOODU NEGATIVE 06/19/2014 07:50 AM    SPECGRAV 1.020 06/19/2014 07:50 AM       Radiology:(All radiographs, tracings, PFTs, and imaging are personally viewed and interpreted unless otherwise noted). XR CHEST PORTABLE    (Results Pending)     No results found.     CONSULTS:-  [x] Cardiology  [] Nephrology  [] Hemo onco   [] GI   [] ID  [] Endocrine  [] Pulmo      [] Neuro    [] Psych   [] Urology  [] ENT   [] Shima Garre   []Ortho    []CV surg        [] Palliative  [] Hospice [] Pain management   []    []TCU   [] PT/OT  OTHERS:-    CODE STATUS:-  [x] Full resuscitation [] DNR-Comfort Care-Arrest  [] DNR-Comfort Care   [] Limited Resuscitation   [] No ET intubation   [] No CPR   [] No shock for non-perfusing rhythm    Total critical care time caring for this patient with life threatening, unstable organ failure, including direct patient contact, management of life support systems, review of data including imaging and labs, discussions with other team members and physicians at least 22  Min so far today, excluding procedures. Electronically signed by Geovanni Lane MD on 10/29/2022 at 11:43 AM  PGY1. Addendum by Dr. Moise Zaldivar MD:  Patient seen by me independently including key components of medical care. Face to face evaluation and examination was performed. Case discussed with Dr. Geovanni Lane MD- resident physician. Italicized font, if present,  represents changes to the note made by me. More than 50% of the encounter time involved with patient care by the Pulmonary & Critical care service team spent by me. Please see my modifications mentioned below:  Patient is in no acute distress. He is on 1 L of oxygen via nasal cannula  He is on Levophed drip at 2 mcg/min  Denies any chest pain. He had a mild chest discomfort at the site of application of defibrillator pads-most likely due to shock given to the morning. CBC:   Recent Labs     10/29/22  1039   WBC 11.5*   HGB 13.4*   HCT 41.2*        BMP:  Recent Labs     10/29/22  1039   *   K 5.0   CL 90*   CO2 12*   BUN 41*   CREATININE 1.3*   GLUCOSE 319*   MG 2.5*   PHOS 7.3*   CALCIUM 10.1     Hepatic: No results for input(s): AST, ALT, ALB, BILITOT, ALKPHOS, LIPASE in the last 72 hours. Invalid input(s): AMYLASE  Cardiac Enzymes: No results for input(s): CKTOTAL, CKMB, TROPONINI in the last 72 hours. BNP: No results for input(s): BNP in the last 72 hours. INR:   Recent Labs     10/29/22  1039   INR 1.35*     POC   Recent Labs     10/29/22  1352   POCGLU 272*     No results for input(s): LACTA in the last 72 hours.      heparin (PORCINE) Infusion 11 Units/kg/hr (10/29/22 1422)    norepinephrine 4 mcg/min (10/29/22 1312)    sodium chloride      dextrose          sodium chloride flush  5-40 mL IntraVENous 2 times per day    atorvastatin  40 mg Oral Nightly    insulin lispro  0-4 Units SubCUTAneous Q4H       24HR INTAKE/OUTPUT:    Intake/Output Summary (Last 24 hours) at 10/29/2022 1424  Last data filed at 10/29/2022 1422  Gross per 24 hour   Intake 69.5 ml   Output 325 ml   Net -255.5 ml     Chest x-ray performed on: 10/29/22  Right internal jugular central venous line as above. DVT prophylaxis reviewed. Heparin drip. Hyponatremia- ?  Etiology. Will restart patient's home medication  Awaiting cardiology consultation.   I spoke with the patient wife and granddaughter at bedside and informed about my impression plan    Electronically signed by   Brandi Uribe MD on 10/29/2022 at 2:22 PM

## 2022-10-29 NOTE — CONSULTS
[facility] [address] [place] [address]  Cardiology Consult        Patient:  Brianne Griffin  YOB: 1960  MRN: [id number]     Acct: [de-identified]    PCP: El Smith DO    Date of Admission: 10/29/2022      Reason for Consultation:  VT      History Of Present Illness:    58 y.o. pleasant male c hx of CAD s/p CABG, recent PCI of OM, LV dysfunction EF 30%, COPD, DM, HLD who presented to the hospital with complaints of shortness of breath. As per patient he has been continuously short of breath since 10/20/2022 when he underwent PCI of OM. He states that he went home the following day and has been having intermittent shoulder pain and body aches. Continue to have shortness of breath but he finally seek medical attention today as it was getting worse. In the ER he was noted to be in wide-complex tachycardia rhythm. He underwent defibrillation with 160 J which converted to normal sinus rhythm. Post cardioversion patient reported he felt better. Of note reviewing his recent cardiac cath showed a OM branch was lost during the PCI. Discussed about cardiac catheterization but patient wanted to watch the symptoms as he was getting better post defibrillation. He is mildly hypotensive when started on amiodarone drip. Of note he has severe LV dysfunction with severe coronary artery disease. All labs, EKG's, diagnostic testing and images as well as cardiac cath, stress testing were reviewed during this encounter.     Past Medical History:          Diagnosis Date    CAD (coronary artery disease)     CHF (congestive heart failure) ([facility])     COPD (chronic obstructive pulmonary disease) ([facility])     Diabetes mellitus ([address]     Hyperlipidemia     PONV (postoperative nausea and vomiting)     Sleep apnea     has CPAP doesn't wear       Past Surgical History:          Procedure Laterality Date    APPENDECTOMY      CARDIAC CATHETERIZATION  9/2/11    CARDIAC CATHETERIZATION  6/12/14    [facility]    COLONOSCOPY CORONARY ANGIOPLASTY WITH STENT PLACEMENT  3/12/10    [facility]    CORONARY ARTERY BYPASS GRAFT  2014    [facility]    VASCULAR SURGERY         Medications Prior to Admission:      Prior to Admission medications    Medication Sig Start Date End Date Taking? Authorizing Provider   amLODIPine (NORVASC) 5 MG tablet Take 1 tablet by mouth daily 10/5/22   Colby Chavez MD   metoprolol succinate (TOPROL XL) 50 MG extended release tablet Take 0.5 tablets by mouth every morning 10/5/22   Delmy Jacobs MD   atorvastatin (LIPITOR) 80 MG tablet Take 1 tablet by mouth nightly 10/5/22   Delmy Jacobs MD   clopidogrel (PLAVIX) 75 MG tablet Take 1 tablet by mouth daily 10/5/22   Delmy Jacobs MD   nitroGLYCERIN (NITROSTAT) 0.4 MG SL tablet Place 1 tablet under the tongue every 5 minutes as needed for Chest pain up to max of 3 total doses.  If no relief after 1 dose, call 911. 10/5/22   Delmy Jacobs MD   losartan (COZAAR) 50 MG tablet Take 1 tablet by mouth daily 10/5/22   Colby Chavez MD   nitroGLYCERIN (NITROSTAT) 0.4 MG SL tablet Place 1 tablet under the tongue every 5 minutes as needed for Chest pain 10/5/22   Delmy Jacobs MD   Empagliflozin-metFORMIN HCl (SYNJARDY) 5-1000 MG TABS Take 1 tablet BID 3/21/22   Keesha Oz, DO   aspirin 325 MG tablet Take 325 mg by mouth daily    Historical Provider, MD   glucose blood VI test strips (FREESTYLE LITE) strip TEST twice a day 5/1/18   Keesha Oz, DO   FREESTYLE LANCETS MISC TEST twice a day 7/1/16   Keesha Oz, DO       Current Facility-Administered Medications   Medication Dose Route Frequency Provider Last Rate Last Admin    heparin (porcine) injection 4,000 Units  4,000 Units IntraVENous PRN Marinell Thaddeus, DO        heparin (porcine) injection 2,000 Units  2,000 Units IntraVENous PRN Marinell Thaddeus, DO        heparin 25,000 units in dextrose 5% 250 mL (premix) infusion  5-30 Units/kg/hr IntraVENous Continuous Marinell Thaddeus, DO 9.5 mL/hr at 10/29/22 5548 11 Units/kg/hr at 10/29/22 1422    norepinephrine (LEVOPHED) 16 mg in sodium chloride 0.9 % 250 mL infusion  1-100 mcg/min IntraVENous Continuous [place], DO   Stopped at 10/29/22 1420    sodium chloride flush 0.9 % injection 5-40 mL  5-40 mL IntraVENous 2 times per day Tammie Fajardo MD        sodium chloride flush 0.9 % injection 5-40 mL  5-40 mL IntraVENous PRN Tammie Fajardo MD        0.9 % sodium chloride infusion   IntraVENous PRN Tammie Fajardo MD        ondansetron (ZOFRAN-ODT) disintegrating tablet 4 mg  4 mg Oral Q8H PRN Tammie Fajardo MD        Or    ondansetron [facility] F) injection 4 mg  4 mg IntraVENous Q6H PRN Tammie Fajardo MD        polyethylene glycol (GLYCOLAX) packet 17 g  17 g Oral Daily PRN Tammie Fajardo MD        acetaminophen (TYLENOL) tablet 650 mg  650 mg Oral Q6H PRN Tammie Fajardo MD        Or    acetaminophen (TYLENOL) suppository 650 mg  650 mg Rectal Q6H PRN Tammie Fajardo MD        potassium chloride 20 mEq/50 mL IVPB (Central Line)  20 mEq IntraVENous PRN Tammie Fajardo MD        Or    potassium chloride 10 mEq/100 mL IVPB (Peripheral Line)  10 mEq IntraVENous PRN Tammie Fajardo MD        magnesium sulfate 2000 mg in 50 mL IVPB premix  2,000 mg IntraVENous PRN Tammie Fajardo MD        sodium phosphate 10 mmol in sodium chloride 0.9 % 250 mL IVPB  10 mmol IntraVENous PRN Tammie Fajardo MD        Or    sodium phosphate 15 mmol in sodium chloride 0.9 % 250 mL IVPB  15 mmol IntraVENous PRN Tammie Fajardo MD        Or    sodium phosphate 20 mmol in sodium chloride 0.9 % 500 mL IVPB  20 mmol IntraVENous PRN Tammie Fajardo MD        atorvastatin (LIPITOR) tablet 40 mg  40 mg Oral Nightly Tammie Fajardo MD        glucose chewable tablet 16 g  4 tablet Oral PRN Tammie Fajardo MD        dextrose bolus 10% 125 mL  125 mL IntraVENous PRN Emerson Russo MD        Or    dextrose bolus 10% 250 mL  250 mL IntraVENous PRN Emerson Russo MD        glucagon (rDNA) injection 1 mg  1 mg SubCUTAneous PRN Emerson Russo MD        dextrose 10 % infusion   IntraVENous Continuous PRN Emerson Russo MD        insulin lispro (HUMALOG) injection vial 0-4 Units  0-4 Units SubCUTAneous Q4H Emerson Russo MD   2 Units at 10/29/22 1356       Allergies:  Patient has no known allergies. Social History:    TOBACCO:   reports that he has been smoking cigarettes. He has a 8.00 pack-year smoking history. He has never used smokeless tobacco.  ETOH:   reports no history of alcohol use. Family History:        Problem Relation Age of Onset    High Blood Pressure Mother     Heart Disease Father     High Blood Pressure Sister     Heart Disease Brother     Heart Disease Brother     Diabetes Paternal Cousin     Cancer Neg Hx     Stroke Neg Hx          Review of Systems -   General ROS: negative  Psychological ROS: negative  Hematological and Lymphatic ROS: No history of blood clots or bleeding disorder. Respiratory ROS: no cough, shortness of breath, or wheezing  Cardiovascular ROS: As per HPI  Gastrointestinal ROS: negative  Genito-Urinary ROS: no dysuria, trouble voiding, or hematuria  Musculoskeletal ROS: negative  Neurological ROS: no TIA or stroke symptoms  Dermatological ROS: negative    All others reviewed and are negative.        /80   Pulse 80   Temp 98 °F (36.7 °C) (Oral)   Resp 20   Wt 190 lb (86.2 kg)   SpO2 98%   BMI 27.26 kg/m²       Physical Examination:   General appearance - alert, in no distress  Mental status - alert, oriented to person, place, and time  Neck - supple, no significant adenopathy, no JVD, or carotid bruits  Chest - clear to auscultation, no wheezes, rales or rhonchi, symmetric air entry  Heart - normal rate, regular rhythm, normal S1, S2, no murmurs, rubs, clicks or gallops  Abdomen - soft, nontender, nondistended, no masses or organomegaly  Neurological - alert, oriented, normal speech, no focal findings or movement disorder noted  Musculoskeletal - no joint tenderness, deformity or swelling  Extremities - peripheral pulses normal, no pedal edema, no clubbing or cyanosis  Skin - normal coloration and turgor, no rashes, no suspicious skin lesions noted      LABS:    Recent Labs     10/29/22  1039   TROPONINT 0.749*     CBC:   Lab Results   Component Value Date/Time    WBC 11.5 10/29/2022 10:39 AM    RBC 4.15 10/29/2022 10:39 AM    RBC 4.19 09/02/2011 07:06 AM    HGB 13.4 10/29/2022 10:39 AM    HCT 41.2 10/29/2022 10:39 AM    MCV 99.3 10/29/2022 10:39 AM    MCH 32.3 10/29/2022 10:39 AM    MCHC 32.5 10/29/2022 10:39 AM    RDW 14.6 05/10/2018 11:10 AM     10/29/2022 10:39 AM    MPV 10.3 10/29/2022 10:39 AM     BMP:    Lab Results   Component Value Date/Time     10/29/2022 10:39 AM    K 5.0 10/29/2022 10:39 AM    CL 90 10/29/2022 10:39 AM    CO2 12 10/29/2022 10:39 AM    BUN 41 10/29/2022 10:39 AM    LABALBU 4.0 10/20/2022 06:23 AM    LABALBU 4.0 09/02/2011 07:06 AM    CREATININE 1.3 10/29/2022 10:39 AM    CALCIUM 10.1 10/29/2022 10:39 AM    LABGLOM >60 10/29/2022 10:34 AM    GLUCOSE 319 10/29/2022 10:39 AM    GLUCOSE 139 11/18/2015 08:16 AM     Hepatic Function Panel:    Lab Results   Component Value Date/Time    ALKPHOS 62 10/20/2022 06:23 AM    ALT 19 10/20/2022 06:23 AM    AST 19 10/20/2022 06:23 AM    PROT 6.9 10/20/2022 06:23 AM    BILITOT 0.2 10/20/2022 06:23 AM    BILIDIR <0.2 03/30/2018 07:04 AM    LABALBU 4.0 10/20/2022 06:23 AM    LABALBU 4.0 09/02/2011 07:06 AM     Magnesium:    Lab Results   Component Value Date/Time    MG 2.5 10/29/2022 10:39 AM     Warfarin PT/INR:  No components found for: PTPATWAR, PTINRWAR  HgBA1c:    Lab Results   Component Value Date/Time    LABA1C 9.5 11/29/2021 07:43 AM     FLP:    Lab Results   Component Value Date/Time    TRIG 135 11/29/2021 07:43 AM    HDL 46 11/29/2021 07:43 AM    LDLCALC 163 11/29/2021 07:43 AM    LDLDIRECT 103 10/31/2015 08:57 AM     TSH:    Lab Results   Component Value Date/Time    TSH 2.290 11/29/2021 07:43 AM     BNP: No components found for: PRO-BNP      Assessment/Plan:    Patient Active Problem List   Diagnosis    COPD (chronic obstructive pulmonary disease) (HCC)    CAD (coronary artery disease)    Post PTCA with MI    Hyperlipidemia    HTN (hypertension)    Tobacco abuse    Polyp of colon    Chest pain syndrome    Abnormal stress ECG with treadmill    Diabetes mellitus type II, uncontrolled    S/P CABG x 2    Uncontrolled type 2 diabetes mellitus, without long-term current use of insulin    Mild left ventricular systolic dysfunction    Dizzinesses    Angina pectoris, crescendo (HCC)    Hypotension     Ventricular tachycardia  Acute on chronic CHF  Hypotension due to #1  LV dysfunction EF 30%  CAD s/p CABG  S/p recent PCI of OM  DM  Smoker  COPD  HLD  Hyponatremia likely due to CHF    S/p defibrillation  Needs Echo  IV amiodarone  IV levophed and keep MAP>65  Needs gentle diuresis as tolerated  Consider renal evaluation   No significant anginal symptoms post cardioversion  Resume Aspirin + Plavix  Further recs based on clinical course    Please do note hesitate to contact me for any further questions. Thank you for the opportunity to be involved in this patient's care.     Code Status: Full Code    Electronically signed by Mary Kate Hernandez MD on 10/29/2022 at 2:25 PM

## 2022-10-29 NOTE — ED PROVIDER NOTES
Michelle Eason, MRN [id number]        Procedural sedation    Date/Time: 10/29/2022 10:54 AM  Performed by: Munir Soria MD  Authorized by: Francisco Merida DO     Consent:     Consent obtained:  Verbal    Consent given by:  Patient    Risks, benefits, and alternatives were discussed: yes      Risks discussed:  Prolonged hypoxia resulting in organ damage, inadequate sedation, respiratory compromise necessitating ventilatory assistance and intubation, nausea and vomiting    Alternatives discussed:  Analgesia without sedation  Universal protocol:     Patient identity confirmed:  Verbally with patient and hospital-assigned identification number  Indications:     Procedure performed:  Cardioversion    Procedure necessitating sedation performed by:  Different physician    Intended level of sedation:  Moderate  Pre-sedation assessment:     NPO status caution: unable to specify NPO status      ASA classification: class 4 - patient with severe systemic disease that is a constant threat to life      Mouth opening:  3 or more finger widths    Mallampati score:  II - soft palate, uvula, fauces visible    Neck mobility: reduced      Pre-sedation assessments completed and reviewed: airway patency, cardiovascular function, mental status and respiratory function      Pre-sedation assessment completed:  10/29/2022 10:17 AM  Immediate pre-procedure details:     Reviewed: vital signs      Verified: bag valve mask available, intubation equipment available and IV patency confirmed    Procedure details (see MAR for exact dosages):     Sedation start time:  10/29/2022 10:17 AM    Preoxygenation:  Nasal cannula    Sedation:  Midazolam    Analgesia:  Fentanyl    Intra-procedure monitoring:  Blood pressure monitoring, frequent vital sign checks, continuous pulse oximetry and cardiac monitor    Intra-procedure events: none      Sedation end time:  10/29/2022 10:38 AM  Post-procedure details:     Post-sedation assessment completed: 10/29/2022 10:48 AM    Attendance: Constant attendance by certified staff until patient recovered      Recovery: Patient returned to pre-procedure baseline      Post-sedation assessments completed and reviewed: cardiovascular function, mental status and pain level      Specimens recovered:  None    Patient is stable for discharge or admission: yes      Procedure completion:  Ellen Miller MD  Resident  10/29/22 7843

## 2022-10-29 NOTE — ED NOTES
Pt medicated per MAR. Pt tolerated well respirations unlabored. Dr. Ju Morley and Dr. Cody De La Rosa at bedside to insert central line at this time.       Poly CASTANEDA RN  10/29/22 1114

## 2022-10-29 NOTE — ED PROVIDER NOTES
[facility]          Pt Name: Narcisa Hammer  MRN: [id number]  [place] 1960  Date of evaluation: 10/29/2022  Treating Resident Physician: Evy Bates MD  Supervising Physician: Grant Pickett DO    History obtained from chart review and the patient. CHIEF COMPLAINT       Chief Complaint   Patient presents with    Shortness of Breath    Jaw Pain           HISTORY OF PRESENT ILLNESS    HPI  Narcisa Hammer is a 58 y.o. male who presents to the emergency department for evaluation of chest pain and shortness of breath. The patient has no other acute complaints at this time. REVIEW OF SYSTEMS   Review of Systems   Constitutional:  Negative for activity change, chills and fever. HENT:  Negative for congestion and rhinorrhea. Eyes:  Negative for redness. Respiratory:  Positive for shortness of breath. Negative for cough. Cardiovascular:  Positive for chest pain and palpitations. Negative for leg swelling. Gastrointestinal:  Negative for abdominal pain, diarrhea and vomiting. Genitourinary:  Negative for hematuria. Musculoskeletal:  Negative for back pain. Skin:  Negative for rash. Neurological:  Negative for weakness and headaches. Psychiatric/Behavioral:  Negative for agitation, behavioral problems and confusion.         PAST MEDICAL AND SURGICAL HISTORY     Past Medical History:   Diagnosis Date    CAD (coronary artery disease)     CHF (congestive heart failure) (HCC)     COPD (chronic obstructive pulmonary disease) (HCC)     Diabetes mellitus ([address]     Hyperlipidemia     PONV (postoperative nausea and vomiting)     Sleep apnea     has CPAP doesn't wear     Past Surgical History:   Procedure Laterality Date    APPENDECTOMY      CARDIAC CATHETERIZATION  9/2/11    CARDIAC CATHETERIZATION  6/12/14    [facility]    COLONOSCOPY      CORONARY ANGIOPLASTY WITH STENT PLACEMENT  3/12/10    [facility]    CORONARY ARTERY BYPASS GRAFT 2014    [facility]    VASCULAR SURGERY           MEDICATIONS     Current Facility-Administered Medications:     heparin (porcine) injection 4,000 Units, 4,000 Units, IntraVENous, PRN, Kipton Back, DO    heparin (porcine) injection 2,000 Units, 2,000 Units, IntraVENous, PRN, Kipton Back, DO    heparin 25,000 units in dextrose 5% 250 mL (premix) infusion, 5-30 Units/kg/hr, IntraVENous, Continuous, Terese Back, DO, Last Rate: 9.5 mL/hr at 10/29/22 1422, 11 Units/kg/hr at 10/29/22 1422    norepinephrine (LEVOPHED) 16 mg in sodium chloride 0.9 % 250 mL infusion, 1-100 mcg/min, IntraVENous, Continuous, Terese Back, DO, Stopped at 10/29/22 1420    sodium chloride flush 0.9 % injection 5-40 mL, 5-40 mL, IntraVENous, 2 times per day, Nisreen Wiggins MD, 20 mL at 10/29/22 1612    sodium chloride flush 0.9 % injection 5-40 mL, 5-40 mL, IntraVENous, PRN, Nisreen Wiggins MD    0.9 % sodium chloride infusion, , IntraVENous, PRN, Nisreen Wiggins MD    ondansetron (ZOFRAN-ODT) disintegrating tablet 4 mg, 4 mg, Oral, Q8H PRN **OR** ondansetron (ZOFRAN) injection 4 mg, 4 mg, IntraVENous, Q6H PRN, Nisreen Wiggins MD    polyethylene glycol (GLYCOLAX) packet 17 g, 17 g, Oral, Daily PRN, Nisreen Wiggins MD    acetaminophen (TYLENOL) tablet 650 mg, 650 mg, Oral, Q6H PRN **OR** acetaminophen (TYLENOL) suppository 650 mg, 650 mg, Rectal, Q6H PRN, Nisreen Wiggins MD    potassium chloride 20 mEq/50 mL IVPB (Central Line), 20 mEq, IntraVENous, PRN **OR** potassium chloride 10 mEq/100 mL IVPB (Peripheral Line), 10 mEq, IntraVENous, PRN, Nisreen Wiggins MD    magnesium sulfate 2000 mg in 50 mL IVPB premix, 2,000 mg, IntraVENous, PRN, Nisreen Wiggins MD    sodium phosphate 10 mmol in sodium chloride 0.9 % 250 mL IVPB, 10 mmol, IntraVENous, PRN **OR** sodium phosphate 15 mmol in sodium chloride 0.9 % 250 mL IVPB, 15 mmol, IntraVENous, PRN **OR** sodium phosphate 20 mmol in sodium chloride 0.9 % 500 mL IVPB, 20 mmol, IntraVENous, PRN, Daniel Gosselin, MD    glucose chewable tablet 16 g, 4 tablet, Oral, PRN, Daniel Gosselin, MD    dextrose bolus 10% 125 mL, 125 mL, IntraVENous, PRN **OR** dextrose bolus 10% 250 mL, 250 mL, IntraVENous, PRN, Daniel Gosselin, MD    glucagon (rDNA) injection 1 mg, 1 mg, SubCUTAneous, PRN, Daniel Gosselin, MD    dextrose 10 % infusion, , IntraVENous, Continuous PRN, Daniel Gosselin, MD    insulin lispro (HUMALOG) injection vial 0-4 Units, 0-4 Units, SubCUTAneous, Q4H, Daniel Gosselin, MD, 2 Units at 10/29/22 3826      SOCIAL HISTORY     Social History     Social History Narrative    Not on file     Social History     Tobacco Use    Smoking status: Every Day     Packs/day: 0.25     Years: 32.00     Pack years: 8.00     Types: Cigarettes    Smokeless tobacco: Never   Vaping Use    Vaping Use: Never used   Substance Use Topics    Alcohol use: No     Alcohol/week: 0.0 standard drinks    Drug use: No         ALLERGIES   No Known Allergies      FAMILY HISTORY     Family History   Problem Relation Age of Onset    High Blood Pressure Mother     Heart Disease Father     High Blood Pressure Sister     Heart Disease Brother     Heart Disease Brother     Diabetes Paternal Cousin     Cancer Neg Hx     Stroke Neg Hx          PREVIOUS RECORDS   Previous records reviewed: Patient underwent cardiac catheterization placement of stent 10/20/2022 by Dr. Hamzah Garcia.    The patient underwent a heart cath and subsequent intervention of the circumflex artery and the obtuse marginal        PHYSICAL EXAM     ED Triage Vitals   BP Temp Temp Source Heart Rate Resp SpO2 Height Weight   10/29/22 1012 10/29/22 1023 10/29/22 1023 10/29/22 1012 10/29/22 1020 10/29/22 1023 -- 10/29/22 1020   (!) 160/126 (!) 96.3 °F (35.7 °C) Axillary (!) 151 19 100 %  190 lb (86.2 kg)     Initial vital signs and nursing assessment reviewed and abnormal from Hypotensive, hypothermic, tachycardic . Body mass index is 27.26 kg/m². Pulsoximetry is normal per my interpretation. Additional Vital Signs:  Vitals:    10/29/22 1430   BP: 118/73   Pulse: 82   Resp: 20   Temp:    SpO2:        Physical Exam  Vitals and nursing note reviewed. Constitutional:       General: He is in acute distress. Appearance: He is normal weight. He is ill-appearing and diaphoretic. He is not toxic-appearing. Interventions: He is not intubated. HENT:      Head: Normocephalic and atraumatic. Right Ear: External ear normal.      Left Ear: External ear normal.      Nose: Nose normal. No congestion or rhinorrhea. Mouth/Throat:      Mouth: Mucous membranes are moist.      Pharynx: Oropharynx is clear. Eyes:      General: No scleral icterus. Conjunctiva/sclera: Conjunctivae normal.   Cardiovascular:      Rate and Rhythm: Regular rhythm. Tachycardia present. Pulses: Normal pulses. Heart sounds: Normal heart sounds. No murmur heard. Pulmonary:      Effort: Pulmonary effort is normal. No accessory muscle usage or respiratory distress. He is not intubated. Breath sounds: Normal breath sounds. No wheezing, rhonchi or rales. Chest:      Chest wall: No mass, deformity, tenderness or crepitus. Abdominal:      General: Abdomen is flat. There is no distension. Palpations: Abdomen is soft. Tenderness: There is no abdominal tenderness. Musculoskeletal:         General: Normal range of motion. Cervical back: Normal range of motion and neck supple. No rigidity. Right lower leg: No edema. Left lower leg: No edema. Lymphadenopathy:      Cervical: No cervical adenopathy. Skin:     General: Skin is warm. Capillary Refill: Capillary refill takes less than 2 seconds. Coloration: Skin is not jaundiced. Neurological:      General: No focal deficit present. Mental Status: He is alert and oriented to person, place, and time.    Psychiatric: Mood and Affect: Mood normal.         Behavior: Behavior normal.           MEDICAL DECISION MAKING   Initial Assessment:   Patient is 79-year-old male past medical history significant for coronary artery disease he states that he had a cardiac stent placed 10/20/2022 by Dr. Milagros Escobar. Patient has developed chest pain and shortness of breath with palpitations. Patient appears in acute distress    Differential diagnosis includes but not limited to: Ventricular tachycardia, cardiogenic shock, ACS, electrolyte abnormality,    Plan:   Based on patient's telemetry monitor he appeared to be in ventricular tachycardia with a rate in the 150s, initial EKG confirmed patient had ventricular tachycardia was unstable with shortness of breath. Decision to cardiovert patient. Patient was given 150 J of synchronized electricity after receiving fentanyl and Versed. Patient was successfully cardioverted out of ventricular tachycardia into sinus rhythm. Patient's blood pressures decreased to the 70s over 40s. Decision to start patient on heparin, Levophed. Consulted cardiology with original activation of Cath Lab as a STEMI. Dr. John Crook evaluated patient and stated that he did not feel patient would benefit from any cardiac catheterization. Reconsulted  patient's pressure decreased as originally instructed to start amiodarone. Recommendation for starting Levophed due to patient's clinical presentation and soft blood pressure. Decision to place central line to deliver pressors. Right IJ was placed in sterile fashion with initial x-ray showing distal catheter routing to patient's axillary vein. Right IJ was withdrawn and new catheter was placed with confirmation of proper placement by bedside x-ray. Patient was admitted to ICU for recent unstable ventricular tachycardia with concern for cardiogenic shock and NSTEMI    Cardiovert / Defib    Date/Time: 10/29/2022 4:28 PM  Performed by:  Haim Muniz MD  Authorized by: Marian Riggs DO     Consent:     Consent obtained:  Verbal    Consent given by:  Patient    Risks, benefits, and alternatives were discussed: yes      Risks discussed:  Induced arrhythmia, pain and death    Alternatives discussed:  No treatment  Universal protocol:     Procedure explained and questions answered to patient or proxy's satisfaction: yes      Relevant documents present and verified: yes      Required blood products, implants, devices, and special equipment available: yes      Immediately prior to procedure, a time out was called: yes      Patient identity confirmed:  Verbally with patient and arm band  Pre-procedure details:     Cardioversion basis:  Emergent    Rhythm:  Ventricular tachycardia  Attempt one:     Cardioversion mode:  Synchronous    Waveform:  Biphasic    Shock (Joules):  150    Shock outcome:  Conversion to normal sinus rhythm  Post-procedure details:     Patient status:  Awake    Procedure completion:  Tolerated well, no immediate complications  Central Line    Date/Time: 10/29/2022 4:29 PM  Performed by:  Shabnam Nye MD  Authorized by: Marian Riggs DO     Consent:     Consent obtained:  Written and verbal    Consent given by:  Patient    Risks, benefits, and alternatives were discussed: yes      Risks discussed:  Arterial puncture, incorrect placement, pneumothorax, infection and bleeding    Alternatives discussed:  No treatment  Universal protocol:     Procedure explained and questions answered to patient or proxy's satisfaction: yes      Relevant documents present and verified: yes      Test results available: yes      Imaging studies available: yes      Required blood products, implants, devices, and special equipment available: yes      Immediately prior to procedure, a time out was called: yes      Patient identity confirmed:  Verbally with patient and arm band  Pre-procedure details:     Indication(s): central venous access      Hand hygiene: Hand hygiene performed prior to insertion      Sterile barrier technique: All elements of maximal sterile technique followed      Skin preparation:  Chlorhexidine  Sedation:     Sedation type:  None  Anesthesia:     Anesthesia method:  Local infiltration    Local anesthetic:  Lidocaine 1% w/o epi  Procedure details:     Location:  R internal jugular    Patient position:  Supine    Procedural supplies:  Triple lumen    Catheter size:  7 Fr    Landmarks identified: yes      Ultrasound guidance: yes      Ultrasound guidance timing: prior to insertion and real time      Sterile ultrasound techniques: Sterile gel and sterile probe covers were used      Number of attempts:  2    Successful placement: yes    Post-procedure details:     Post-procedure:  Dressing applied and line sutured    Assessment:  Blood return through all ports, free fluid flow, no pneumothorax on x-ray and placement verified by x-ray    Procedure completion:  Tolerated    Complications:  Initial central line showed distal catheter to patient's axillary veins. The central venous catheter was removed with another central line placed through right IJ without complication of proper placement confirmed by chest x-ray.       ED RESULTS   Laboratory results:  Labs Reviewed   BASIC METABOLIC PANEL W/ REFLEX TO MG FOR LOW K - Abnormal; Notable for the following components:       Result Value    Sodium 126 (*)     Chloride 90 (*)     CO2 12 (*)     Glucose 319 (*)     BUN 41 (*)     Creatinine 1.3 (*)     All other components within normal limits   CBC WITH AUTO DIFFERENTIAL - Abnormal; Notable for the following components:    WBC 11.5 (*)     RBC 4.15 (*)     Hemoglobin 13.4 (*)     Hematocrit 41.2 (*)     MCV 99.3 (*)     RDW-SD 50.7 (*)     Segs Absolute 8.8 (*)     Immature Grans (Abs) 0.11 (*)     All other components within normal limits   TROPONIN - Abnormal; Notable for the following components:    Troponin T 0.749 (*)     All other components within normal limits   BRAIN NATRIURETIC PEPTIDE - Abnormal; Notable for the following components:    Pro-BNP 74039.0 (*)     All other components within normal limits   URINALYSIS WITH REFLEX TO CULTURE - Abnormal; Notable for the following components:    Glucose, Ur >= 1000 (*)     Ketones, Urine TRACE (*)     Specific Gravity, Urine > 1.030 (*)     All other components within normal limits   PROTIME-INR - Abnormal; Notable for the following components:    INR 1.35 (*)     All other components within normal limits   ANION GAP - Abnormal; Notable for the following components:    Anion Gap 24.0 (*)     All other components within normal limits   MAGNESIUM - Abnormal; Notable for the following components:    Magnesium 2.5 (*)     All other components within normal limits   PHOSPHORUS - Abnormal; Notable for the following components:    Phosphorus 7.3 (*)     All other components within normal limits   HEMOGLOBIN A1C - Abnormal; Notable for the following components:    Hemoglobin A1C 8.7 (*)     AVERAGE GLUCOSE 201 (*)     All other components within normal limits   POCT GLUCOSE - Abnormal; Notable for the following components:    POC Glucose 272 (*)     All other components within normal limits   VRE SCREEN BY PCR   APTT   GLOMERULAR FILTRATION RATE, ESTIMATED   OSMOLALITY   MRSA BY PCR   ANTI-XA, UNFRACTIONATED HEPARIN   ANTI-XA, UNFRACTIONATED HEPARIN   CALCIUM, IONIZED   LACTIC ACID   CBC WITH AUTO DIFFERENTIAL   HEPATIC FUNCTION PANEL   TROPONIN   POCT GLUCOSE   POCT GLUCOSE   POCT GLUCOSE       Radiologic studies results:  XR CHEST PORTABLE   Final Result      Right internal jugular central venous line as above. **This report has been created using voice recognition software. It may contain minor errors which are inherent in voice recognition technology. **      Final report electronically signed by Dr. Erica Mcdonald on 10/29/2022 12:57 PM      XR CHEST PORTABLE   Final Result      Glidewire within a right internal jugular central venous line extending to the right upper abdomen. **This report has been created using voice recognition software. It may contain minor errors which are inherent in voice recognition technology. **      Final report electronically signed by Dr. Naomi Hong on 10/29/2022 12:53 PM      XR CHEST PORTABLE   Final Result   1. No acute intrathoracic process. 2. Moderate stable cardiomegaly. **This report has been created using voice recognition software. It may contain minor errors which are inherent in voice recognition technology. **      Final report electronically signed by Dr. Naomi Hong on 10/29/2022 12:06 PM          ED Medications administered this visit:   Medications   heparin (porcine) injection 4,000 Units (has no administration in time range)   heparin (porcine) injection 2,000 Units (has no administration in time range)   heparin 25,000 units in dextrose 5% 250 mL (premix) infusion (11 Units/kg/hr × 86.2 kg IntraVENous Rate/Dose Verify 10/29/22 1422)   norepinephrine (LEVOPHED) 16 mg in sodium chloride 0.9 % 250 mL infusion (0 mcg/min IntraVENous Stopped 10/29/22 1420)   sodium chloride flush 0.9 % injection 5-40 mL (20 mLs IntraVENous Given 10/29/22 1612)   sodium chloride flush 0.9 % injection 5-40 mL (has no administration in time range)   0.9 % sodium chloride infusion (has no administration in time range)   ondansetron (ZOFRAN-ODT) disintegrating tablet 4 mg (has no administration in time range)     Or   ondansetron (ZOFRAN) injection 4 mg (has no administration in time range)   polyethylene glycol (GLYCOLAX) packet 17 g (has no administration in time range)   acetaminophen (TYLENOL) tablet 650 mg (has no administration in time range)     Or   acetaminophen (TYLENOL) suppository 650 mg (has no administration in time range)   potassium chloride 20 mEq/50 mL IVPB (Central Line) (has no administration in time range)     Or   potassium chloride 10 mEq/100 mL IVPB (Peripheral Line) (has no administration in time range)   magnesium sulfate 2000 mg in 50 mL IVPB premix (has no administration in time range)   sodium phosphate 10 mmol in sodium chloride 0.9 % 250 mL IVPB (has no administration in time range)     Or   sodium phosphate 15 mmol in sodium chloride 0.9 % 250 mL IVPB (has no administration in time range)     Or   sodium phosphate 20 mmol in sodium chloride 0.9 % 500 mL IVPB (has no administration in time range)   glucose chewable tablet 16 g (has no administration in time range)   dextrose bolus 10% 125 mL (has no administration in time range)     Or   dextrose bolus 10% 250 mL (has no administration in time range)   glucagon (rDNA) injection 1 mg (has no administration in time range)   dextrose 10 % infusion (has no administration in time range)   insulin lispro (HUMALOG) injection vial 0-4 Units (2 Units SubCUTAneous Given 10/29/22 1356)   heparin (porcine) injection 4,000 Units (4,000 Units IntraVENous Given 10/29/22 1103)   magnesium sulfate 2000 mg in 50 mL IVPB premix (0 mg IntraVENous Stopped 10/29/22 1302)   aspirin chewable tablet 324 mg (324 mg Oral Given 10/29/22 1040)   fentaNYL (SUBLIMAZE) injection 50 mcg (50 mcg IntraVENous Given 10/29/22 1017)   midazolam (VERSED) injection 1 mg (1 mg IntraVENous Given 10/29/22 1012)         ED COURSE     ED Course as of 10/29/22 1637   Sat Oct 29, 2022   1035 Patient unstable VT. Cardioverted. Patient is post PCI. Paged Cardio/Cath lab as Stemi alert [DD]   [id number] CVC appeared to be in axillary vein, repositioned, now in SVC. [EM]      ED Course User Index  [DD] Patricia Case DO  [EM] Jayden Metcalf MD           MEDICATION CHANGES     Current Discharge Medication List            FINAL DISPOSITION     Final diagnoses:   Ventricular tachycardia   Cardiogenic shock ([address]   NSTEMI (non-ST elevated myocardial infarction) ([address]     Condition: condition: critical  Dispo:  Admit to CCU/ICU      This transcription was electronically signed. Parts of this transcriptions may have been dictated by use of voice recognition software and electronically transcribed, and parts may have been transcribed with the assistance of an ED scribe. The transcription may contain errors not detected in proofreading. Please refer to my supervising physician's documentation if my documentation differs. Electronically Signed:  Bg Craft MD, 10/29/22, 4:37 PM         Bg Craft MD  Resident  10/29/22 9407

## 2022-10-30 LAB
ALBUMIN SERPL-MCNC: 3.6 G/DL (ref 3.5–5.1)
ALP BLD-CCNC: 186 U/L (ref 38–126)
ALT SERPL-CCNC: 485 U/L (ref 11–66)
ANION GAP SERPL CALCULATED.3IONS-SCNC: 17 MEQ/L (ref 8–16)
AST SERPL-CCNC: 172 U/L (ref 5–40)
BASE EXCESS (CALCULATED): -1.6 MMOL/L (ref -2.5–2.5)
BASOPHILS # BLD: 0.6 %
BASOPHILS ABSOLUTE: 0.1 THOU/MM3 (ref 0–0.1)
BILIRUB SERPL-MCNC: 0.5 MG/DL (ref 0.3–1.2)
BILIRUBIN DIRECT: < 0.2 MG/DL (ref 0–0.3)
BUN BLDV-MCNC: 32 MG/DL (ref 7–22)
CALCIUM IONIZED: 1.11 MMOL/L (ref 1.12–1.32)
CALCIUM SERPL-MCNC: 9.1 MG/DL (ref 8.5–10.5)
CHLORIDE BLD-SCNC: 96 MEQ/L (ref 98–111)
CO2: 18 MEQ/L (ref 23–33)
COLLECTED BY:: ABNORMAL
CREAT SERPL-MCNC: 0.8 MG/DL (ref 0.4–1.2)
DEVICE: ABNORMAL
EKG ATRIAL RATE: 150 BPM
EKG ATRIAL RATE: 86 BPM
EKG P AXIS: 72 DEGREES
EKG P-R INTERVAL: 112 MS
EKG P-R INTERVAL: 168 MS
EKG Q-T INTERVAL: 328 MS
EKG Q-T INTERVAL: 406 MS
EKG QRS DURATION: 188 MS
EKG QRS DURATION: 92 MS
EKG QTC CALCULATION (BAZETT): 485 MS
EKG QTC CALCULATION (BAZETT): 518 MS
EKG R AXIS: -59 DEGREES
EKG R AXIS: 118 DEGREES
EKG T AXIS: -43 DEGREES
EKG T AXIS: 127 DEGREES
EKG VENTRICULAR RATE: 150 BPM
EKG VENTRICULAR RATE: 86 BPM
EOSINOPHIL # BLD: 1.3 %
EOSINOPHILS ABSOLUTE: 0.1 THOU/MM3 (ref 0–0.4)
ERYTHROCYTE [DISTWIDTH] IN BLOOD BY AUTOMATED COUNT: 14.1 % (ref 11.5–14.5)
ERYTHROCYTE [DISTWIDTH] IN BLOOD BY AUTOMATED COUNT: 49.5 FL (ref 35–45)
GFR SERPL CREATININE-BSD FRML MDRD: > 60 ML/MIN/1.73M2
GLUCOSE BLD-MCNC: 158 MG/DL (ref 70–108)
GLUCOSE BLD-MCNC: 169 MG/DL (ref 70–108)
GLUCOSE BLD-MCNC: 171 MG/DL (ref 70–108)
GLUCOSE BLD-MCNC: 239 MG/DL (ref 70–108)
GLUCOSE BLD-MCNC: 242 MG/DL (ref 70–108)
GLUCOSE BLD-MCNC: 248 MG/DL (ref 70–108)
HCO3: 21 MMOL/L (ref 23–28)
HCT VFR BLD CALC: 37.4 % (ref 42–52)
HEMOGLOBIN: 12.6 GM/DL (ref 14–18)
HEPARIN UNFRACTIONATED: 0.19 U/ML (ref 0.3–0.7)
HEPARIN UNFRACTIONATED: 0.3 U/ML (ref 0.3–0.7)
IMMATURE GRANS (ABS): 0.04 THOU/MM3 (ref 0–0.07)
IMMATURE GRANULOCYTES: 0.5 %
LACTIC ACID: 1.1 MMOL/L (ref 0.5–2)
LYMPHOCYTES # BLD: 22.2 %
LYMPHOCYTES ABSOLUTE: 1.9 THOU/MM3 (ref 1–4.8)
MCH RBC QN AUTO: 32.4 PG (ref 26–33)
MCHC RBC AUTO-ENTMCNC: 33.7 GM/DL (ref 32.2–35.5)
MCV RBC AUTO: 96.1 FL (ref 80–94)
MONOCYTES # BLD: 8.8 %
MONOCYTES ABSOLUTE: 0.8 THOU/MM3 (ref 0.4–1.3)
NUCLEATED RED BLOOD CELLS: 0 /100 WBC
O2 SATURATION: 96 %
PCO2: 28 MMHG (ref 35–45)
PH BLOOD GAS: 7.48 (ref 7.35–7.45)
PLATELET # BLD: 266 THOU/MM3 (ref 130–400)
PMV BLD AUTO: 10.2 FL (ref 9.4–12.4)
PO2: 73 MMHG (ref 71–104)
POTASSIUM REFLEX MAGNESIUM: 4.3 MEQ/L (ref 3.5–5.2)
RBC # BLD: 3.89 MILL/MM3 (ref 4.7–6.1)
SEG NEUTROPHILS: 66.6 %
SEGMENTED NEUTROPHILS ABSOLUTE COUNT: 5.8 THOU/MM3 (ref 1.8–7.7)
SODIUM BLD-SCNC: 131 MEQ/L (ref 135–145)
SOURCE, BLOOD GAS: ABNORMAL
TOTAL PROTEIN: 6.8 G/DL (ref 6.1–8)
WBC # BLD: 8.7 THOU/MM3 (ref 4.8–10.8)

## 2022-10-30 PROCEDURE — 6360000002 HC RX W HCPCS: Performed by: INTERNAL MEDICINE

## 2022-10-30 PROCEDURE — 6370000000 HC RX 637 (ALT 250 FOR IP): Performed by: EMERGENCY MEDICINE

## 2022-10-30 PROCEDURE — 82948 REAGENT STRIP/BLOOD GLUCOSE: CPT

## 2022-10-30 PROCEDURE — 6370000000 HC RX 637 (ALT 250 FOR IP): Performed by: STUDENT IN AN ORGANIZED HEALTH CARE EDUCATION/TRAINING PROGRAM

## 2022-10-30 PROCEDURE — 83605 ASSAY OF LACTIC ACID: CPT

## 2022-10-30 PROCEDURE — 2580000003 HC RX 258: Performed by: EMERGENCY MEDICINE

## 2022-10-30 PROCEDURE — 99233 SBSQ HOSP IP/OBS HIGH 50: CPT | Performed by: INTERNAL MEDICINE

## 2022-10-30 PROCEDURE — 80048 BASIC METABOLIC PNL TOTAL CA: CPT

## 2022-10-30 PROCEDURE — 36600 WITHDRAWAL OF ARTERIAL BLOOD: CPT

## 2022-10-30 PROCEDURE — 82330 ASSAY OF CALCIUM: CPT

## 2022-10-30 PROCEDURE — 36415 COLL VENOUS BLD VENIPUNCTURE: CPT

## 2022-10-30 PROCEDURE — 85520 HEPARIN ASSAY: CPT

## 2022-10-30 PROCEDURE — 99232 SBSQ HOSP IP/OBS MODERATE 35: CPT | Performed by: STUDENT IN AN ORGANIZED HEALTH CARE EDUCATION/TRAINING PROGRAM

## 2022-10-30 PROCEDURE — 80076 HEPATIC FUNCTION PANEL: CPT

## 2022-10-30 PROCEDURE — 2140000000 HC CCU INTERMEDIATE R&B

## 2022-10-30 PROCEDURE — 6360000002 HC RX W HCPCS: Performed by: EMERGENCY MEDICINE

## 2022-10-30 PROCEDURE — 85025 COMPLETE CBC W/AUTO DIFF WBC: CPT

## 2022-10-30 PROCEDURE — 82803 BLOOD GASES ANY COMBINATION: CPT

## 2022-10-30 PROCEDURE — 93010 ELECTROCARDIOGRAM REPORT: CPT | Performed by: INTERNAL MEDICINE

## 2022-10-30 RX ORDER — ATORVASTATIN CALCIUM 40 MG/1
40 TABLET, FILM COATED ORAL NIGHTLY
Status: DISCONTINUED | OUTPATIENT
Start: 2022-10-30 | End: 2022-10-31 | Stop reason: HOSPADM

## 2022-10-30 RX ORDER — INSULIN LISPRO 100 [IU]/ML
0-4 INJECTION, SOLUTION INTRAVENOUS; SUBCUTANEOUS NIGHTLY
Status: DISCONTINUED | OUTPATIENT
Start: 2022-10-30 | End: 2022-10-31

## 2022-10-30 RX ORDER — METOPROLOL SUCCINATE 25 MG/1
25 TABLET, EXTENDED RELEASE ORAL DAILY
Status: DISCONTINUED | OUTPATIENT
Start: 2022-10-30 | End: 2022-10-31 | Stop reason: HOSPADM

## 2022-10-30 RX ORDER — INSULIN LISPRO 100 [IU]/ML
0-4 INJECTION, SOLUTION INTRAVENOUS; SUBCUTANEOUS
Status: DISCONTINUED | OUTPATIENT
Start: 2022-10-30 | End: 2022-10-31

## 2022-10-30 RX ORDER — ENOXAPARIN SODIUM 100 MG/ML
40 INJECTION SUBCUTANEOUS EVERY 24 HOURS
Status: DISCONTINUED | OUTPATIENT
Start: 2022-10-30 | End: 2022-10-31 | Stop reason: HOSPADM

## 2022-10-30 RX ADMIN — INSULIN LISPRO 1 UNITS: 100 INJECTION, SOLUTION INTRAVENOUS; SUBCUTANEOUS at 17:25

## 2022-10-30 RX ADMIN — CLOPIDOGREL BISULFATE 75 MG: 75 TABLET ORAL at 08:49

## 2022-10-30 RX ADMIN — SODIUM CHLORIDE, PRESERVATIVE FREE 20 ML: 5 INJECTION INTRAVENOUS at 21:21

## 2022-10-30 RX ADMIN — ASPIRIN 325 MG: 325 TABLET ORAL at 08:49

## 2022-10-30 RX ADMIN — INSULIN LISPRO 1 UNITS: 100 INJECTION, SOLUTION INTRAVENOUS; SUBCUTANEOUS at 11:55

## 2022-10-30 RX ADMIN — ATORVASTATIN CALCIUM 40 MG: 40 TABLET, FILM COATED ORAL at 21:21

## 2022-10-30 RX ADMIN — HEPARIN SODIUM 2000 UNITS: 1000 INJECTION, SOLUTION INTRAVENOUS; SUBCUTANEOUS at 00:05

## 2022-10-30 RX ADMIN — SODIUM CHLORIDE, PRESERVATIVE FREE 10 ML: 5 INJECTION INTRAVENOUS at 08:50

## 2022-10-30 RX ADMIN — ENOXAPARIN SODIUM 40 MG: 100 INJECTION SUBCUTANEOUS at 16:44

## 2022-10-30 RX ADMIN — HEPARIN SODIUM 15 UNITS/KG/HR: 10000 INJECTION, SOLUTION INTRAVENOUS at 09:50

## 2022-10-30 RX ADMIN — METOPROLOL SUCCINATE 25 MG: 25 TABLET, FILM COATED, EXTENDED RELEASE ORAL at 13:14

## 2022-10-30 ASSESSMENT — ENCOUNTER SYMPTOMS
ABDOMINAL PAIN: 0
CONSTIPATION: 0
NAUSEA: 0
VOMITING: 0
WHEEZING: 0
COUGH: 0
SORE THROAT: 0
SHORTNESS OF BREATH: 0
DIARRHEA: 0

## 2022-10-30 ASSESSMENT — PAIN SCALES - GENERAL
PAINLEVEL_OUTOF10: 0

## 2022-10-30 ASSESSMENT — PAIN DESCRIPTION - LOCATION: LOCATION: BACK

## 2022-10-30 ASSESSMENT — PAIN DESCRIPTION - PAIN TYPE: TYPE: CHRONIC PAIN

## 2022-10-30 NOTE — SIGNIFICANT EVENT
Patient was stable to transfer to stepdown unit. Handoff given to Dr. Nelida Lynn.      Electronically signed by Mine Calzada DO on 10/30/2022 at 1:29 PM

## 2022-10-30 NOTE — PROGRESS NOTES
Hospitalist Progress Note       Patient:  Tavon Almeida  YOB: 1960    MRN: [id number]     Acct: [de-identified]    PCP: Viktor Caballero DO    Date of Admission: 10/29/2022    Date of Service: Pt seen/examined on 10/30/22  and Admitted to Inpatient with expected LOS greater than two midnights due to medical therapy. Chief Complaint: Rebel Carrel s/p defibrillation    Assessment and Plan:    1.) Sustained Vtach: S/p defibrillation on 10/29/2022 with successful cardioversion to NSR. Has been on telemetry since admission, no reoccurring episodes since being placed in ICU. -Continue telemetry  -Cardiology consulted, appreciate recommendations  -Likely secondary to patient's low ejection heart failure, will need LifeVest outpatient  -Patient prefers defibrillator placement now    2.)  Decompensated HFrEF (EF 25-20% 10/2022), CAD, HLD: Cardiology restarted patient on Toprol on 10/30. LifeVest ordered however patient requests defibrillator prior to leaving. Continue with recommendations from cardiology for further medication titration. 3.)  Primary HTN: Home metoprolol restarted, follow cardiology recommendations on restarting losartan and Norvasc. 4.)  Moderate hypoosmolar hyponatremia, IMPROVING: Asymptomatic, likely due to chronic CHF. Continue to monitor. 5.)  Anion gap metabolic acidosis, IMPROVING: Likely secondary to hypoperfusion during V. tach episode. Continue to monitor, lactic acid normalized. Repeat ABG shows improvement with now overcorrection to alkalosis. Continue to monitor. 6.)  Transaminitis, IMPROVING: Likely secondary to hypoperfusion in the setting of hypotension during V. tach. Continue to monitor. Avoiding hepatic toxic agents at this time. 7.)  T2DM, non-insulin-dependent: Holding home meds in the inpatient setting. Continue with low-dose SSI and hypoglycemia protocols.     8.)  Hypotension, RESOLVED: Secondary to episodes of V. tach, currently off pressors. 9.)  PETER, RESOLVED      DVT Prophylaxis: SCDs      History Of Present Illness:    58 y.o. male who presented to [facility] with a past medical history of CAD s/p CABG, CHF, COPD, T2DM, hyperlipidemia. He presented initially to the emergency department due to shortness of breath and chest pain. He was found to be in V. tach on telemetry and required defibrillation to convert back to normal sinus rhythm at 160 J. Due to the nature of his presentation, a right IJ was placed for further administration of norepinephrine from transient hypotension. He had a cardiac catheterization on 10/21/2022 with a drug-eluting stent placed in the left circumflex artery. Initially there was concern for possible reocclusion, and cardiology was consulted with recommendations of medical management. The patient was transported initially to the ICU for management of pressor support which was successfully weaned off. Cardiology successfully restarted Toprol and has explained to the patient that due to his episode of V. tach, he will need a LifeVest or defibrillator outpatient. On 10/30, he was deemed stable for transfer to a stepdown unit. 10/30:  I seen and examined the patient while he was in the ICU. The patient himself seems eager to get out of the hospital but also acknowledges that it is situation will require a LifeVest or defibrillator. His father had to have a defibrillator placed, and states that he would like to have 1 place before he leaves the hospital to avoid having to return or to have to wear a LifeVest.  We explained that we will discuss this with cardiology and let them be aware. He denies any fever, chills, nausea, vomiting, diarrhea, issues with urination, shortness of breath, or current chest pains outside the chest pain associated with being shocked. He is nervous that he has to lie flat for central line catheter to be pulled.     Past Medical History:          Diagnosis Date CAD (coronary artery disease)     CHF (congestive heart failure) ([facility])     COPD (chronic obstructive pulmonary disease) ([facility])     Diabetes mellitus ([facility] [address]     Hyperlipidemia     PONV (postoperative nausea and vomiting)     Sleep apnea     has CPAP doesn't wear       Past Surgical History:          Procedure Laterality Date    APPENDECTOMY      CARDIAC CATHETERIZATION  9/2/11    CARDIAC CATHETERIZATION  6/12/14    [facility]    COLONOSCOPY      CORONARY ANGIOPLASTY WITH STENT PLACEMENT  3/12/10    [facility]    CORONARY ARTERY BYPASS GRAFT  2014    [facility]    VASCULAR SURGERY         Medications Prior to Admission:      Prior to Admission medications    Medication Sig Start Date End Date Taking? Authorizing Provider   amLODIPine (NORVASC) 5 MG tablet Take 1 tablet by mouth daily 10/5/22   Colby Orellana MD   metoprolol succinate (TOPROL XL) 50 MG extended release tablet Take 0.5 tablets by mouth every morning 10/5/22   Radha Guevara MD   atorvastatin (LIPITOR) 80 MG tablet Take 1 tablet by mouth nightly 10/5/22   Radha Guevara MD   clopidogrel (PLAVIX) 75 MG tablet Take 1 tablet by mouth daily 10/5/22   Radha Guevara MD   nitroGLYCERIN (NITROSTAT) 0.4 MG SL tablet Place 1 tablet under the tongue every 5 minutes as needed for Chest pain up to max of 3 total doses.  If no relief after 1 dose, call 911. 10/5/22   Radha Guevara MD   losartan (COZAAR) 50 MG tablet Take 1 tablet by mouth daily 10/5/22   Colby Orellana MD   nitroGLYCERIN (NITROSTAT) 0.4 MG SL tablet Place 1 tablet under the tongue every 5 minutes as needed for Chest pain 10/5/22   Radha Guevara MD   Empagliflozin-metFORMIN HCl [facility]) 5-1000 MG TABS Take 1 tablet BID 3/21/22   Malik Salines, DO   aspirin 325 MG tablet Take 325 mg by mouth daily    Historical Provider, MD   glucose blood VI test strips (FREESTYLE LITE) strip TEST twice a day 5/1/18   Malik Salines, DO   FREESTYLE LANCETS MISC TEST twice a day 7/1/16   Malik Salines, DO Allergies:  Patient has no known allergies. Social History:      The patient currently lives at home with his family. His sister and his wife are in the room. TOBACCO:   reports that he has been smoking cigarettes. He has a 8.00 pack-year smoking history. He has never used smokeless tobacco.  ETOH:   reports no history of alcohol use. Family History:       Reviewed in detail and negative for DM, CAD, Cancer, CVA. Positive as follows:        Problem Relation Age of Onset    High Blood Pressure Mother     Heart Disease Father     High Blood Pressure Sister     Heart Disease Brother     Heart Disease Brother     Diabetes Paternal Cousin     Cancer Neg Hx     Stroke Neg Hx        Diet:  ADULT DIET; Regular; Low Sodium (2 gm)    REVIEW OF SYSTEMS:   Pertinent positives as noted in the HPI. All other systems reviewed and negative. PHYSICAL EXAM:    /74   Pulse 76   Temp 98 °F (36.7 °C) (Oral)   Resp 20   Wt 190 lb (86.2 kg)   SpO2 99%   BMI 27.26 kg/m²     General appearance:  No apparent distress, appears stated age and cooperative. HEENT:  Normal cephalic, atraumatic without obvious deformity. Pupils equal, round, and reactive to light. Extra ocular muscles intact. Conjunctivae/corneas clear. Poor dentition. Neck: Supple, with full range of motion. No jugular venous distention. Trachea midline. Central line in place, clean and dressed. Respiratory:  Normal respiratory effort. Clear to auscultation, bilaterally without Rales/Wheezes/Rhonchi. Cardiovascular:  Regular rate and rhythm with normal S1/S2 without murmurs, rubs or gallops. Abdomen: Soft, non-tender, non-distended with normal bowel sounds. Musculoskeletal:  No clubbing, cyanosis or edema bilaterally. Full range of motion without deformity. Skin: Skin color, texture, turgor normal.  No rashes or lesions. Neurologic:  Neurovascularly intact without any focal sensory/motor deficits.  Cranial nerves: II-XII intact, grossly non-focal.  Psychiatric:  Alert and oriented, thought content appropriate, normal insight  Capillary Refill: Brisk,< 3 seconds   Peripheral Pulses: +2 palpable, equal bilaterally       Labs:     Recent Labs     10/29/22  1039 10/29/22  1630 10/30/22  0520   WBC 11.5* 9.6 8.7   HGB 13.4* 12.5* 12.6*   HCT 41.2* 37.4* 37.4*    271 266     Recent Labs     10/29/22  1039 10/30/22  0520   * 131*   K 5.0 4.3   CL 90* 96*   CO2 12* 18*   BUN 41* 32*   CREATININE 1.3* 0.8   CALCIUM 10.1 9.1   PHOS 7.3*  --      Recent Labs     10/29/22  1630 10/30/22  0520   * 172*   * 485*   BILIDIR <0.2 <0.2   BILITOT 0.4 0.5   ALKPHOS 186* 186*     Recent Labs     10/29/22  1039   INR 1.35*     No results for input(s): CKTOTAL, TROPONINI in the last 72 hours. Urinalysis:      Lab Results   Component Value Date/Time    NITRU NEGATIVE 10/29/2022 01:21 PM    BLOODU NEGATIVE 10/29/2022 01:21 PM    SPECGRAV 1.020 06/19/2014 07:50 AM    GLUCOSEU >= 1000 10/29/2022 01:21 PM       Intake & Output:  I/O last 3 completed shifts: In: 582.9 [P.O.:440; I.V.:96.1; IV Piggyback:46.9]  Out: 2300 [Urine:2300]  I/O this shift: In: 591.6 [P.O.:400; I.V.:191.6]  Out: -       Radiology:     CXR: I have reviewed the CXR with the following interpretation: No acute intrathoracic process, stable cardiomegaly branch  EKG:  I have reviewed the EKG with the following interpretation: NSR, possible LAD, left fascicular block. Code Status: Full Code    PT/OT Eval Status: Not Consulted    Disposition: TBD, will need LifeVest or Defibrillator outpatient. Active Hospital Problems    Diagnosis Date Noted    Hypotension [I95.9] 10/29/2022     Priority: Medium    Ventricular tachycardia [I47.20] 10/29/2022     Priority: Medium       Thank you Bala Grace DO for the opportunity to be involved in this patient's care.     Electronically signed by Afshan Jaramillo DO on 10/30/2022 at 1:23 PM

## 2022-10-30 NOTE — DISCHARGE INSTRUCTIONS
F/u with Dr Sally Ramirez soon for further evaluation. Recheck LFT in 1-2 weeks. Consider amiodarone after this. Lifvest prior to discharge to prevent sudden cardiac death. Heart Failure: Care Instructions  Your Care Instructions     Heart failure occurs when your heart does not pump as much blood as the body needs. Failure does not mean that the heart has stopped pumping but rather that it is not pumping as well as it should. Over time, this causes fluid buildup in your lungs and other parts of your body. Fluid buildup can cause shortness of breath, fatigue, swollen ankles, and other problems. By taking medicines regularly, reducing sodium (salt) in your diet, checking your weight every day, and making lifestyle changes, you can feel better and live longer. Follow-up care is a key part of your treatment and safety. Be sure to make and go to all appointments, and call your doctor if you are having problems. It's also a good idea to know your test results and keep a list of the medicines you take. How can you care for yourself at home? Medicines    Be safe with medicines. Take your medicines exactly as prescribed. Call your doctor if you think you are having a problem with your medicine. Do not take any vitamins, over-the-counter medicine, or herbal products without talking to your doctor first. Shelley Lopescher not take ibuprofen (Advil or Motrin) and naproxen (Aleve) without talking to your doctor first. They could make your heart failure worse. You may take some of the following medicine. Angiotensin-converting enzyme inhibitors (ACEIs) or angiotensin II receptor blockers (ARBs) reduce the heart's workload, lower blood pressure, and reduce swelling. Taking an ACEI or ARB may lower your chance of needing to be hospitalized. Beta-blockers can slow heart rate, decrease blood pressure, and improve your condition. Taking a beta-blocker may lower your chance of needing to be hospitalized.   Diuretics, also called water pills, reduce swelling. You will get more details on the specific medicines your doctor prescribes. Diet    Your doctor may suggest that you limit sodium. Your doctor can tell you how much sodium is right for you. An example is less than 3,000 mg a day. This includes all the salt you eat in cooking or in packaged foods. People get most of their sodium from processed foods. Fast food and restaurant meals also tend to be very high in sodium. Ask your doctor how much liquid you can drink each day. You may have to limit liquids. Weight    Weigh yourself without clothing at the same time each day. Record your weight. Call your doctor if you have a sudden weight gain, such as more than 2 to 3 pounds in a day or 5 pounds in a week. (Your doctor may suggest a different range of weight gain.) A sudden weight gain may mean that your heart failure is getting worse. Activity level    Start light exercise (if your doctor says it is okay). Even if you can only do a small amount, exercise will help you get stronger, have more energy, and manage your weight and your stress. Walking is an easy way to get exercise. Start out by walking a little more than you did before. Bit by bit, increase the amount you walk. When you exercise, watch for signs that your heart is working too hard. You are pushing yourself too hard if you cannot talk while you are exercising. If you become short of breath or dizzy or have chest pain, stop, sit down, and rest.     If you feel \"wiped out\" the day after you exercise, walk slower or for a shorter distance until you can work up to a better pace. Get enough rest at night. Sleeping with 1 or 2 pillows under your upper body and head may help you breathe easier. Lifestyle changes    Do not smoke. Smoking can make a heart condition worse. If you need help quitting, talk to your doctor about stop-smoking programs and medicines.  These can increase your chances of quitting for good. Quitting smoking may be the most important step you can take to protect your heart. Limit alcohol to 2 drinks a day for men and 1 drink a day for women. Too much alcohol can cause health problems. Avoid getting sick from colds and the flu. Get a pneumococcal vaccine shot. If you have had one before, ask your doctor whether you need another dose. Get a flu shot each year. If you must be around people with colds or the flu, wash your hands often. When should you call for help? Call 911  if you have symptoms of sudden heart failure such as: You have severe trouble breathing. You cough up pink, foamy mucus. You have a new irregular or rapid heartbeat. Call your doctor now or seek immediate medical care if:    You have new or increased shortness of breath. You are dizzy or lightheaded, or you feel like you may faint. You have sudden weight gain, such as more than 2 to 3 pounds in a day or 5 pounds in a week. (Your doctor may suggest a different range of weight gain.)     You have increased swelling in your legs, ankles, or feet. You are suddenly so tired or weak that you cannot do your usual activities. Watch closely for changes in your health, and be sure to contact your doctor if you develop new symptoms. Where can you learn more? Go to https://Social [email or web address].[employer]. org and sign in to your [employer] account. Enter O750 in the [facility] box to learn more about \"Heart Failure: Care Instructions. \"     If you do not have an account, please click on the \"Sign Up Now\" link. Current as of: January 10, 2022               Content Version: 13.4  © 3851-5537 [employer]. Care instructions adapted under license by [facility] ([facility]). If you have questions about a medical condition or this instruction, always ask your healthcare professional. [place] [address] any warranty or liability for your use of this information.            Learning About Ventricular Tachycardia  What is ventricular tachycardia? Ventricular tachycardia (say \"minerva-TRICK-yuh-ler qsim-ek-HCI-faiza-uh\"), or VT, is a type of fast heart rhythm. It starts in the lower part of the heart (ventricles). Some forms of VT may get worse and lead to ventricular fibrillation. Both conditions can be life-threatening. What causes it? VT may be caused by a heart problem that affects the structure of the heart or the heart muscle. These problems may include a heart attack, a heart defect that you were born with, or inflammation in the heart. Sometimes VT happens after heart surgery. Other heart rhythm problems can also lead to it. Some people with normal hearts have VT. This tends to be less serious. Some medicines, such as those used to treat some types of abnormal heart rhythms, can cause VT. Other causes include electrolyte imbalances and using illegal drugs such as stimulants like cocaine. In some cases, the cause isn't known. What are the symptoms? Symptoms of VT include:  Palpitations. This is an uncomfortable awareness of the heart beating very fast or not in a regular way. Feeling dizzy or lightheaded. Shortness of breath. Chest pain or pressure. Near-fainting or fainting. Symptoms happen because the heart beats too fast. It can't pump enough blood to the rest of the body. How is VT diagnosed? Your doctor will do an exam and ask about your health history. Your doctor will also do an electrocardiogram (EKG, ECG). This is a tracing of the electrical activity of your heart. VT can come and go. It may be hard to capture with an EKG at your doctor's office. So the doctor may want you to wear a heart monitor. It records your heart rhythm over a few days. You may have lab tests and a chest X-ray. Your doctor may also recommend other tests. An echocardiogram looks at the structure of your heart.   A stress test can show if the heart muscle is getting enough blood or if there are any blockages in the arteries of the heart. An electrophysiology (EP) study can find specific areas of your heart that may be causing the VT. The results of these tests can help your doctor decide what treatment options you have. How is it treated? Goals of treatment are to:  Prevent an abnormal heartbeat. Improve your symptoms. Prevent cardiac arrest (the heart stops pumping blood) and sudden death. To prevent VT and relieve symptoms, you may take heart rhythm medicines. Some people may have a catheter ablation. This procedure destroys small areas of heart tissue that cause the irregular heartbeat. It may make VT happen less often. Or it may stop VT from happening again. Your doctor may recommend a device that can detect a life-threatening abnormal heartbeat and help restore a normal rhythm. This device might be implanted (ICD, or implantable cardioverter-defibrillator) or worn as a vest.  If you have VT that is not stopping, it is a medical emergency. You may need a shock to try to get your heart back into a normal rhythm. This can be from an automated external defibrillator (AED), by paramedics, or through treatment in an emergency room. A doctor may give you medicines if your condition is stable. Follow-up care is a key part of your treatment and safety. Be sure to make and go to all appointments, and call your doctor if you are having problems. It's also a good idea to know your test results and keep a list of the medicines you take. Where can you learn more? Go to https://[employer]ice.[employer]. org and sign in to your [employer] account. Enter V110 in the [facility] box to learn more about \"Learning About Ventricular Tachycardia. \"     If you do not have an account, please click on the \"Sign Up Now\" link. Current as of: January 10, 2022               Content Version: 13.4  © 6699-9777 [employer]. Care instructions adapted under license by [facility] ([facility]).  If you have questions about a medical condition or this instruction, always ask your healthcare professional. [place] [address] any warranty or liability for your use of this information.

## 2022-10-30 NOTE — PROGRESS NOTES
CRITICAL CARE PROGRESS NOTE      Patient:  Dasha Crespo    Unit/Bed:4D-11/011-A  YOB: 1960  MRN: [id number]   PCP: Anita Woody DO  Date of Admission: 10/29/2022  Chief Complaint:- vtach s/p defibrillation    Assessment and Plan:    Sustained Vtach:  s/p defibrillation on 10/29/22 with successful conversion to NSR. No more episodes since he has been in the ICU. Monitor on telemetry. Acute on chronic systolic heart failure:  EF 25-30% on TTE 10/29/22. Cardiology started Toprol and ordered [place] Lathe as tolerated. CAD:  s/p PCI/JULIEN to OM with Dr. Nichole Calvillo on 10/21/22. Hx of prior CABG. Continue aspirin and Plavix. Continue lipitor. Primary HTN: Home metoprolol restarted. Losartan & norvasc can be restarted when indicated. Hyponatremia: Improving. Asymptomatic. Likely due to CHF. Monitor. Anion gap metabolic acidosis: Improving. Possibly due to hypoperfusion during vtach episode. Monitor. Lactic acid has normalized. Check ABG. Transaminitis: Improving. Likely due to hypoperfusion during vtach episode. Monitor. Avoid hepatotoxic agents  NIDDM2:  Home meds held. Low dose SSI with hypoglycemia protocol and POCT glucose every 4 ACHS. Transient hypotension:  Resolved. Due to vtach episodes. Off pressors. PETER: Resolved. INITIAL H AND P AND ICU COURSE:  Belkis Lopez is a 27-year-old male current smoker with a past medical history of CAD status post CABG, CHF, COPD, diabetes, hyperlipidemia who presented to the emergency department complaining of shortness of breath and chest pain. He was in V. tach on telemetry, required defibrillation to convert back to NSR at 160J. Right IJ was placed for administration of norepinephrine due to transient hypotension. He is status post cardiac catheterization on 10/21/2022 with a drug-eluting stent placed in the left circumflex artery.  There was a concern for possible reocclusion, cardiologist consulted and recommends medical management at this time. ICU admission was for pressors which were successfully weaned off. Cardiology was able to restart Toprol and has requested for a LifeVest.  Patient was stable for transfer to stepdown unit. Past Medical History:   has a past medical history of CAD (coronary artery disease), CHF (congestive heart failure) ([facility] [address] COPD (chronic obstructive pulmonary disease) ([facility] [address] Diabetes mellitus ([facility] [address] Hyperlipidemia, PONV (postoperative nausea and vomiting), and Sleep apnea. .  Family History:  family history includes Diabetes in his paternal cousin; Heart Disease in his brother, brother, and father; High Blood Pressure in his mother and sister. .  Social History:    Social History     Socioeconomic History    Marital status:      Spouse name: Janel Engle    Number of children: 2    Years of education: 12    Highest education level: High school graduate   Occupational History    Not on file   Tobacco Use    Smoking status: Every Day     Packs/day: 0.25     Years: 32.00     Pack years: 8.00     Types: Cigarettes    Smokeless tobacco: Never   Vaping Use    Vaping Use: Never used   Substance and Sexual Activity    Alcohol use: No     Alcohol/week: 0.0 standard drinks    Drug use: No    Sexual activity: Yes     Partners: Female   Other Topics Concern    Not on file   Social History Narrative    Not on file     Social Determinants of Health     Financial Resource Strain: Low Risk     Difficulty of Paying Living Expenses: Not very hard   Food Insecurity: No Food Insecurity    Worried About Running Out of Food in the Last Year: Never true    Ran Out of Food in the Last Year: Never true   Transportation Needs: No Transportation Needs    Lack of Transportation (Medical): No    Lack of Transportation (Non-Medical): No   Physical Activity: Inactive    Days of Exercise per Week: 0 days    Minutes of Exercise per Session: 0 min   Stress: No Stress Concern Present    Feeling of Stress :  Only a little   Social Connections: Not on file   Intimate Partner Violence: Not on file   Housing Stability: Low Risk     Unable to Pay for Housing in the Last Year: No    Number of Places Lived in the Last Year: 1    Unstable Housing in the Last Year: No     .  ROS   Review of Systems   Constitutional:  Negative for chills, diaphoresis, fatigue and fever. HENT:  Negative for sneezing and sore throat. Respiratory:  Negative for cough, shortness of breath and wheezing. Cardiovascular:  Negative for chest pain, palpitations and leg swelling. Gastrointestinal:  Negative for abdominal pain, constipation, diarrhea, nausea and vomiting. Genitourinary:  Negative for dysuria. Neurological:  Negative for syncope, weakness, light-headedness, numbness and headaches. Scheduled Meds:   sodium chloride flush  5-40 mL IntraVENous 2 times per day    insulin lispro  0-4 Units SubCUTAneous Q4H    aspirin  325 mg Oral Daily    atorvastatin  80 mg Oral Nightly    clopidogrel  75 mg Oral Daily     Continuous Infusions:   heparin (PORCINE) Infusion 15 Units/kg/hr (10/30/22 0950)    sodium chloride      dextrose         PHYSICAL EXAMINATION:  T: 98.  P: 76. RR: 20. B/P: 115/74. O2 Sat: 99% on RA. I/O: -400mL  Body mass index is 27.26 kg/m². GCS:   15  General:   Well-appearing male. HEENT:  normocephalic and atraumatic. No scleral icterus. PERR  Neck: supple. No Thyromegaly. Lungs: clear to auscultation. No retractions  Cardiac: RRR. No JVD. Abdomen: soft. Nontender. Extremities:  No clubbing, cyanosis, or edema x 4. Vasculature: capillary refill < 3 seconds. Palpable dorsalis pedis pulses. Skin:  warm and dry. Psych:  Alert and oriented x3. Affect appropriate  Lymph:  No supraclavicular adenopathy. Neurologic:  No focal deficit. No seizures. Data: (All radiographs, tracings, PFTs, and imaging are personally viewed and interpreted unless otherwise noted).    Sodium 131, potassium 4.3, chloride 96, CO2 18, BUN 32, creatinine 0.8, AG 17, ionized calcium 1.11, GFR > 60, lactic acid 1.1, , calcium 9.1, total protein 6.8  Albumin 3.6, alk phos 186, ALT 45, , bilirubin 0.5  WBC 8.7, RBC 3.89, Hgb 12.6, HCT 37.4, MCV 96.1,   Telemetry shows NSR    Seen with multidisciplinary ICU team no weekend. Meets Continued ICU Level Care Criteria:    [x] Yes   [] No - Transfer Planned to listed location:  [] HOSPITALIST CONTACTED-      Case and plan discussed with Dr. Tanisha Castellanos. Electronically signed by El Hernandez DO  [facility] Zhenjohnna Mondragon by Dr. Tanisha Castellanos MD:  Patient seen by me independently including key components of medical care. Face to face evaluation and examination was performed. Case discussed with Dr. El Hernandez, [address] [place] [address] physician. Italicized font, if present,  represents changes to the note made by me. More than 50% of the encounter time involved with patient care by the Pulmonary & Critical care service team spent by me.     Please see my modifications mentioned below:  Patient is sitting in chair in no distress    Lab Results   Component Value Date/Time    PH 7.29 06/23/2014 02:53 PM    PCO2 42 06/23/2014 02:53 PM    PO2 70 06/23/2014 02:53 PM    HCO3 20 06/23/2014 02:53 PM    O2SAT 92 06/23/2014 02:53 PM     Lab Results   Component Value Date/Time    IFIO2 40 06/23/2014 02:53 PM    MODE CPAP+PS 06/23/2014 02:53 PM    SETTIDVOL 770.0 06/23/2014 12:12 PM    SETPEEP 5 06/23/2014 02:53 PM       CBC:   Recent Labs     10/29/22  1039 10/29/22  1630 10/30/22  0520   WBC 11.5* 9.6 8.7   HGB 13.4* 12.5* 12.6*   HCT 41.2* 37.4* 37.4*    271 266     BMP:  Recent Labs     10/29/22  1039 10/30/22  0520   * 131*   K 5.0 4.3   CL 90* 96*   CO2 12* 18*   BUN 41* 32*   CREATININE 1.3* 0.8   GLUCOSE 319* 171*   MG 2.5*  --    PHOS 7.3*  --    CALCIUM 10.1 9.1     Hepatic:   Recent Labs     10/29/22  1630 10/30/22  0520   * 172*   * 485*   BILITOT 0.4 0.5   ALKPHOS 186* 186*     Cardiac Enzymes: No results for input(s): CKTOTAL, CKMB, TROPONINI in the last 72 hours. BNP: No results for input(s): BNP in the last 72 hours. INR:   Recent Labs     10/29/22  1039   INR 1.35*     POC   Recent Labs     10/30/22  0329 10/30/22  0847 10/30/22  1148   POCGLU 158* 169* 239*     Recent Labs     10/29/22  1630 10/30/22  0520   LACTA 2.1* 1.1        sodium chloride      dextrose          metoprolol succinate  25 mg Oral Daily    atorvastatin  40 mg Oral Nightly    insulin lispro  0-4 Units SubCUTAneous TID WC    insulin lispro  0-4 Units SubCUTAneous Nightly    sodium chloride flush  5-40 mL IntraVENous 2 times per day    aspirin  325 mg Oral Daily    clopidogrel  75 mg Oral Daily       24HR INTAKE/OUTPUT:    Intake/Output Summary (Last 24 hours) at 10/30/2022 1402  Last data filed at 10/30/2022 1300  Gross per 24 hour   Intake 1574.52 ml   Output 2725 ml   Net -1150.48 ml       Chest x-ray performed on: 10/30/22  Right internal jugular central venous line as above. DVT prophylaxis reviewed. Heparin drip was discontinued. Start patient on Lovenox 40 mg subcu daily for DVT prophylaxis  Right IJ CVC line need to be discontinued.     Electronically signed by   Ronald Eugene MD on 10/30/2022 at 2:02 PM

## 2022-10-30 NOTE — PROGRESS NOTES
Reported leg/ankle/foot swelling to Dr. Genet Ware. EF 25-30%. Checked if any diuretics needed for the patient. Dr. Genet Ware said physician to evaluate need for diuretics tomorrow.

## 2022-10-31 VITALS
BODY MASS INDEX: 27.26 KG/M2 | WEIGHT: 190 LBS | TEMPERATURE: 97.5 F | OXYGEN SATURATION: 97 % | RESPIRATION RATE: 18 BRPM | DIASTOLIC BLOOD PRESSURE: 56 MMHG | HEART RATE: 69 BPM | SYSTOLIC BLOOD PRESSURE: 115 MMHG

## 2022-10-31 LAB
ALBUMIN SERPL-MCNC: 3.5 G/DL (ref 3.5–5.1)
ALP BLD-CCNC: 166 U/L (ref 38–126)
ALT SERPL-CCNC: 326 U/L (ref 11–66)
ANION GAP SERPL CALCULATED.3IONS-SCNC: 14 MEQ/L (ref 8–16)
AST SERPL-CCNC: 68 U/L (ref 5–40)
BASOPHILS # BLD: 0.6 %
BASOPHILS ABSOLUTE: 0 THOU/MM3 (ref 0–0.1)
BILIRUB SERPL-MCNC: 0.4 MG/DL (ref 0.3–1.2)
BILIRUBIN DIRECT: < 0.2 MG/DL (ref 0–0.3)
BUN BLDV-MCNC: 28 MG/DL (ref 7–22)
CALCIUM IONIZED: 1.1 MMOL/L (ref 1.12–1.32)
CALCIUM SERPL-MCNC: 9 MG/DL (ref 8.5–10.5)
CHLORIDE BLD-SCNC: 98 MEQ/L (ref 98–111)
CO2: 20 MEQ/L (ref 23–33)
CREAT SERPL-MCNC: 0.8 MG/DL (ref 0.4–1.2)
EOSINOPHIL # BLD: 1.3 %
EOSINOPHILS ABSOLUTE: 0.1 THOU/MM3 (ref 0–0.4)
ERYTHROCYTE [DISTWIDTH] IN BLOOD BY AUTOMATED COUNT: 14 % (ref 11.5–14.5)
ERYTHROCYTE [DISTWIDTH] IN BLOOD BY AUTOMATED COUNT: 49.8 FL (ref 35–45)
GFR SERPL CREATININE-BSD FRML MDRD: > 60 ML/MIN/1.73M2
GLUCOSE BLD-MCNC: 197 MG/DL (ref 70–108)
GLUCOSE BLD-MCNC: 206 MG/DL (ref 70–108)
GLUCOSE BLD-MCNC: 269 MG/DL (ref 70–108)
HAV IGM SER IA-ACNC: NEGATIVE
HCT VFR BLD CALC: 37.8 % (ref 42–52)
HEMOGLOBIN: 12.2 GM/DL (ref 14–18)
HEPATITIS B CORE IGM ANTIBODY: NEGATIVE
HEPATITIS B SURFACE ANTIGEN: NEGATIVE
HEPATITIS C ANTIBODY: NEGATIVE
IMMATURE GRANS (ABS): 0.04 THOU/MM3 (ref 0–0.07)
IMMATURE GRANULOCYTES: 0.6 %
LACTIC ACID: 1.1 MMOL/L (ref 0.5–2)
LYMPHOCYTES # BLD: 23.1 %
LYMPHOCYTES ABSOLUTE: 1.7 THOU/MM3 (ref 1–4.8)
MCH RBC QN AUTO: 31.9 PG (ref 26–33)
MCHC RBC AUTO-ENTMCNC: 32.3 GM/DL (ref 32.2–35.5)
MCV RBC AUTO: 98.7 FL (ref 80–94)
MONOCYTES # BLD: 10.6 %
MONOCYTES ABSOLUTE: 0.8 THOU/MM3 (ref 0.4–1.3)
NUCLEATED RED BLOOD CELLS: 0 /100 WBC
PLATELET # BLD: 277 THOU/MM3 (ref 130–400)
PMV BLD AUTO: 10.1 FL (ref 9.4–12.4)
POTASSIUM REFLEX MAGNESIUM: 4.7 MEQ/L (ref 3.5–5.2)
RBC # BLD: 3.83 MILL/MM3 (ref 4.7–6.1)
SEG NEUTROPHILS: 63.8 %
SEGMENTED NEUTROPHILS ABSOLUTE COUNT: 4.6 THOU/MM3 (ref 1.8–7.7)
SODIUM BLD-SCNC: 132 MEQ/L (ref 135–145)
TOTAL PROTEIN: 6.5 G/DL (ref 6.1–8)
WBC # BLD: 7.2 THOU/MM3 (ref 4.8–10.8)

## 2022-10-31 PROCEDURE — 2580000003 HC RX 258: Performed by: EMERGENCY MEDICINE

## 2022-10-31 PROCEDURE — 6370000000 HC RX 637 (ALT 250 FOR IP): Performed by: STUDENT IN AN ORGANIZED HEALTH CARE EDUCATION/TRAINING PROGRAM

## 2022-10-31 PROCEDURE — 99232 SBSQ HOSP IP/OBS MODERATE 35: CPT | Performed by: STUDENT IN AN ORGANIZED HEALTH CARE EDUCATION/TRAINING PROGRAM

## 2022-10-31 PROCEDURE — 82330 ASSAY OF CALCIUM: CPT

## 2022-10-31 PROCEDURE — 36415 COLL VENOUS BLD VENIPUNCTURE: CPT

## 2022-10-31 PROCEDURE — 80076 HEPATIC FUNCTION PANEL: CPT

## 2022-10-31 PROCEDURE — 82948 REAGENT STRIP/BLOOD GLUCOSE: CPT

## 2022-10-31 PROCEDURE — 99239 HOSP IP/OBS DSCHRG MGMT >30: CPT | Performed by: STUDENT IN AN ORGANIZED HEALTH CARE EDUCATION/TRAINING PROGRAM

## 2022-10-31 PROCEDURE — 80074 ACUTE HEPATITIS PANEL: CPT

## 2022-10-31 PROCEDURE — 85025 COMPLETE CBC W/AUTO DIFF WBC: CPT

## 2022-10-31 PROCEDURE — 83605 ASSAY OF LACTIC ACID: CPT

## 2022-10-31 PROCEDURE — 80048 BASIC METABOLIC PNL TOTAL CA: CPT

## 2022-10-31 RX ORDER — DEXTROSE MONOHYDRATE 100 MG/ML
INJECTION, SOLUTION INTRAVENOUS CONTINUOUS PRN
Status: DISCONTINUED | OUTPATIENT
Start: 2022-10-31 | End: 2022-10-31 | Stop reason: HOSPADM

## 2022-10-31 RX ORDER — INSULIN LISPRO 100 [IU]/ML
0-4 INJECTION, SOLUTION INTRAVENOUS; SUBCUTANEOUS NIGHTLY
Status: DISCONTINUED | OUTPATIENT
Start: 2022-10-31 | End: 2022-10-31 | Stop reason: HOSPADM

## 2022-10-31 RX ORDER — ATORVASTATIN CALCIUM 80 MG/1
40 TABLET, FILM COATED ORAL NIGHTLY
Qty: 90 TABLET | Refills: 0 | Status: SHIPPED | OUTPATIENT
Start: 2022-10-31 | End: 2022-11-22 | Stop reason: SDUPTHER

## 2022-10-31 RX ORDER — INSULIN LISPRO 100 [IU]/ML
0-8 INJECTION, SOLUTION INTRAVENOUS; SUBCUTANEOUS
Status: DISCONTINUED | OUTPATIENT
Start: 2022-10-31 | End: 2022-10-31 | Stop reason: HOSPADM

## 2022-10-31 RX ORDER — BUMETANIDE 1 MG/1
1 TABLET ORAL DAILY
Status: DISCONTINUED | OUTPATIENT
Start: 2022-10-31 | End: 2022-10-31 | Stop reason: HOSPADM

## 2022-10-31 RX ORDER — BUMETANIDE 1 MG/1
1 TABLET ORAL DAILY
Qty: 30 TABLET | Refills: 3 | Status: SHIPPED | OUTPATIENT
Start: 2022-11-01 | End: 2022-11-22 | Stop reason: SDUPTHER

## 2022-10-31 RX ADMIN — ASPIRIN 325 MG: 325 TABLET ORAL at 08:33

## 2022-10-31 RX ADMIN — SACUBITRIL AND VALSARTAN 1 TABLET: 24; 26 TABLET, FILM COATED ORAL at 11:43

## 2022-10-31 RX ADMIN — BUMETANIDE 1 MG: 1 TABLET ORAL at 11:16

## 2022-10-31 RX ADMIN — INSULIN LISPRO 4 UNITS: 100 INJECTION, SOLUTION INTRAVENOUS; SUBCUTANEOUS at 12:34

## 2022-10-31 RX ADMIN — METOPROLOL SUCCINATE 25 MG: 25 TABLET, FILM COATED, EXTENDED RELEASE ORAL at 08:34

## 2022-10-31 RX ADMIN — SODIUM CHLORIDE, PRESERVATIVE FREE 10 ML: 5 INJECTION INTRAVENOUS at 08:36

## 2022-10-31 RX ADMIN — CLOPIDOGREL BISULFATE 75 MG: 75 TABLET ORAL at 08:33

## 2022-10-31 ASSESSMENT — PAIN SCALES - GENERAL
PAINLEVEL_OUTOF10: 0

## 2022-10-31 NOTE — CARE COORDINATION
Case Management Assessment  Initial Evaluation    Date/Time of Evaluation: 10/31/2022 1:04 PM  Assessment Completed by: Maisha Alicia RN    If patient is discharged prior to next notation, then this note serves as note for discharge by case management. Patient Name: Remberto Griffin                   YOB: 1960  Diagnosis: Ventricular tachycardia [I47.20]  Hypotension [I95.9]                   Date / Time: 10/29/2022 10:12 AM    Patient Admission Status: Inpatient     Current PCP: KENDRA Alejandre CNP  PCP verified by CM? Yes (States sees Johanna Fuentes at [facility] )    Chart Reviewed: Yes      Patient Orientation: Alert and Oriented    Patient Cognition: Alert  History Provided by: Patient    Hospitalization in the last 30 days (Readmission):  No    If yes, Readmission Assessment in CM Navigator will be completed. Advance Directives:     Code Status: Full Code       Discharge Planning  Patient lives with: Spouse/Significant Other Type of Home: Apartment   Primary Caregiver: Self  Patient Support Systems include: Spouse/Significant Other, Family Members, Children   Current Financial resources: Medicaid  Current community resources: Other (Comment) (will be new to HF clinic)  Current services prior to admission: None   Type of Home Care services:  None    ADLS  Prior functional level: Independent in ADLs/IADLs  Current functional level: Independent in ADLs/IADLs      Family can provide assistance at DC: Yes  Would you like Case Management to discuss the discharge plan with any other family members/significant others, and if so, who?  No  Plans to Return to Present Housing: Yes  Other Identified Issues/Barriers to RETURNING to current housing: None  Potential Assistance needed at discharge: N/A  Patient expects to discharge to: [address] [place] [address] for transportation at discharge: Family    Financial  Payor: [employer] / Plan: Aaliyah Pathak / Product Type: *No Product type* /     Does insurance require precert for SNF: Yes    Potential assistance Purchasing Medications: No  Meds-to-Beds request:        [address]  Phone: [phone] Fax: [phone] , [address] [place] [address] Ne  [address] [place], [place] [address] [address]  Phone: [phone] Fax: [phone]      Factors facilitating achievement of predicted outcomes: Family support and Has needed Durable Medical Equipment at home    Barriers to discharge: Medical complications and Life Vest.     Additional Case Management Notes: Admitted to ICU from ED with SOB, jaw pain, VT. Cardioverted in ED. Transferred to 3B over the weekend. Possible discharge today if Life Vest approved. [facility] aware of need for [employer]	Viktor Torre, paperwork faxed by RN. Hospitalist and Cardiology following. Possible discharge after Life Vest approval and fitting. Lovenox. The Plan for Transition of Care is related to the following treatment goals of Ventricular tachycardia [I47.20]  Hypotension [I95.9]    Patient Goals/Plan/Treatment Preferences: Plans home with wife and new Life Vest, referral to [facility]. Transportation/Food Security/Housekeeping Addressed:  No issues identified. Mitra Peacock RN  Case Management Department    10/31/22, 1:12 PM EDT    Patient goals/plan/ treatment preferences discussed by  and . Patient goals/plan/ treatment preferences reviewed with patient/ family. Patient/ family verbalize understanding of discharge plan and are in agreement with goal/plan/treatment preferences. Understanding was demonstrated using the teach back method.   AVS provided by RN at time of discharge, which includes all necessary medical information pertaining to the patients current course of illness, treatment, post-discharge goals of care, and treatment preferences. Services At/After Discharge: DME and Outpatient, Life Vest and [facility]             Planning home today after Life Vest fitting at 4pm. Denies other needs.

## 2022-10-31 NOTE — PROGRESS NOTES
Discharge teaching and instructions for diagnosis/procedure of CHF, V-tach completed with patient using teachback method. AVS reviewed. Prescriptions picked up by family at [facility]. [facility]. Patient voiced understanding regarding prescriptions, follow up appointments, and care of self at home. Discharged in a wheelchair to  home with support per family. Patient stable at discharge. Patient wearing Lifevest at discharge.

## 2022-10-31 NOTE — PROGRESS NOTES
Cardiology Progress Note      Patient:  Annabelle Zepeda  YOB: 1960  MRN: [id number]   Acct: [de-identified]  Admit Date:  10/29/2022  Primary Cardiologist:  Hamzah Garcia  Per DR Delgado's note:  \"Reason for Consultation:  VT        History Of Present Illness:    58 y.o. pleasant male c hx of CAD s/p CABG, recent PCI of OM, LV dysfunction EF 30%, COPD, DM, HLD who presented to the hospital with complaints of shortness of breath. As per patient he has been continuously short of breath since 10/20/2022 when he underwent PCI of OM. He states that he went home the following day and has been having intermittent shoulder pain and body aches. Continue to have shortness of breath but he finally seek medical attention today as it was getting worse. In the ER he was noted to be in wide-complex tachycardia rhythm. He underwent defibrillation with 160 J which converted to normal sinus rhythm. Post cardioversion patient reported he felt better. Of note reviewing his recent cardiac cath showed a OM branch was lost during the PCI. Discussed about cardiac catheterization but patient wanted to watch the symptoms as he was getting better post defibrillation. He is mildly hypotensive when started on amiodarone drip. Of note he has severe LV dysfunction with severe coronary artery disease. All labs, EKG's, diagnostic testing and images as well as cardiac cath, stress testing were reviewed during this encounter. \"    Subjective (Events in last 24 hours):   Pt awake, alert. NAD. Denies cp or sob currently. Feeling much improved today. Reviewed monitor reuslts with patient. D/w patient about plans again and all questions answered for patient and family. Patient does seem overwhelmed. Assurance given about 1 step at a time and will work toward improving his heart and other issues.       Objective:   BP (!) 145/71   Pulse 67   Temp 97.5 °F (36.4 °C) (Oral)   Resp 18   Wt 190 lb (86.2 kg)   SpO2 96%   BMI 27.26 kg/m² vss  TELEMETRY: nsr, 1 episode of NSVT    Physical Exam:  General Appearance: alert and oriented to person, place and time, in no acute distress  Cardiovascular: normal rate, regular rhythm, normal S1 and S2, no murmurs, rubs, clicks, or gallops, distal pulses intact, no carotid bruits, no JVD  Pulmonary/Chest: clear to auscultation bilaterally- no wheezes, rales or rhonchi, normal air movement, no respiratory distress  Abdomen: soft, non-tender, non-distended, normal bowel sounds, no masses   Extremities: no cyanosis, clubbing, 1+ bilat LE edema, pulse   Skin: warm and dry, no rash or erythema  Neurological: alert, oriented, normal speech, no focal findings or movement disorder noted    Medications:    insulin lispro  0-8 Units SubCUTAneous TID WC    insulin lispro  0-4 Units SubCUTAneous Nightly    bumetanide  1 mg Oral Daily    metoprolol succinate  25 mg Oral Daily    atorvastatin  40 mg Oral Nightly    enoxaparin  40 mg SubCUTAneous Q24H    sodium chloride flush  5-40 mL IntraVENous 2 times per day    aspirin  325 mg Oral Daily    clopidogrel  75 mg Oral Daily      dextrose      sodium chloride       glucose, 4 tablet, PRN  dextrose bolus, 125 mL, PRN   Or  dextrose bolus, 250 mL, PRN  glucagon (rDNA), 1 mg, PRN  dextrose, , Continuous PRN  sodium chloride flush, 5-40 mL, PRN  sodium chloride, , PRN  ondansetron, 4 mg, Q8H PRN   Or  ondansetron, 4 mg, Q6H PRN  polyethylene glycol, 17 g, Daily PRN  acetaminophen, 650 mg, Q6H PRN   Or  acetaminophen, 650 mg, Q6H PRN  potassium chloride, 20 mEq, PRN   Or  potassium chloride, 10 mEq, PRN  magnesium sulfate, 2,000 mg, PRN  sodium phosphate IVPB, 10 mmol, PRN   Or  sodium phosphate IVPB, 15 mmol, PRN   Or  sodium phosphate IVPB, 20 mmol, PRN      Diagnostics:  EKG:     Echo:   Conclusions      Summary   Technically difficult examination. Left ventricular size is mildly increased and systolic function is   severely reduced.  Ejection fraction was estimated at 25-30%. LV wall   thickness is within normal limits. There was severe global hypokinesis of the left ventricle. Signature      ----------------------------------------------------------------   Electronically signed by Boom Calvillo MD (Interpreting   physician) on 10/29/2022  Stress:     Left Heart Cath:     Lab Data:    Cardiac Enzymes:  No results for input(s): CKTOTAL, CKMB, CKMBINDEX, TROPONINI in the last 72 hours.     CBC:   Lab Results   Component Value Date/Time    WBC 7.2 10/31/2022 03:32 AM    RBC 3.83 10/31/2022 03:32 AM    RBC 4.19 09/02/2011 07:06 AM    HGB 12.2 10/31/2022 03:32 AM    HCT 37.8 10/31/2022 03:32 AM     10/31/2022 03:32 AM       CMP:    Lab Results   Component Value Date/Time     10/31/2022 03:32 AM    K 4.7 10/31/2022 03:32 AM    CL 98 10/31/2022 03:32 AM    CO2 20 10/31/2022 03:32 AM    BUN 28 10/31/2022 03:32 AM    CREATININE 0.8 10/31/2022 03:32 AM    AGRATIO 1.8 11/18/2015 08:16 AM    LABGLOM >60 10/31/2022 12:16 AM    GLUCOSE 206 10/31/2022 03:32 AM    GLUCOSE 139 11/18/2015 08:16 AM    CALCIUM 9.0 10/31/2022 03:32 AM       Hepatic Function Panel:    Lab Results   Component Value Date/Time    ALKPHOS 166 10/31/2022 03:32 AM     10/31/2022 03:32 AM    AST 68 10/31/2022 03:32 AM    PROT 6.5 10/31/2022 03:32 AM    BILITOT 0.4 10/31/2022 03:32 AM    BILIDIR <0.2 10/31/2022 03:32 AM    LABALBU 3.5 10/31/2022 03:32 AM    LABALBU 4.0 09/02/2011 07:06 AM       Magnesium:    Lab Results   Component Value Date/Time    MG 2.5 10/29/2022 10:39 AM       PT/INR:    Lab Results   Component Value Date/Time    INR 1.35 10/29/2022 10:39 AM       HgBA1c:    Lab Results   Component Value Date/Time    LABA1C 8.7 10/29/2022 10:39 AM       FLP:    Lab Results   Component Value Date/Time    TRIG 135 11/29/2021 07:43 AM    HDL 46 11/29/2021 07:43 AM    LDLCALC 163 11/29/2021 07:43 AM    LDLDIRECT 103 10/31/2015 08:57 AM       TSH:    Lab Results   Component Value Date/Time    TSH 2.290 11/29/2021 07:43 AM         Assessment:  Sustained VT s/p defibrillation  Hypotension d/t VT-resolved   Acute on chronic systolic CHF- swelling today   EF 25-30%  Cad s/p prior CABg  S/p recent PCI of OM  DM-needs better control  Smoker-willing to quit   COPD  HLD    Plan:  Symptoms have resolved. No further chest pain or SOB. Anxious and somewhat overwhelmed. Needs lifevest prior to discharge. Amio is CI currently d/t elevated LFT. These are improving. Most recent LFT prior to admit was 2018, normal at that time. Consider in the future if LFT return to normal. 2 week LFT labs. Restart metoprolol 25 mg daily. Continue plavix, cut statin in half for elevated LFTs, lipitor 40 mg daily. Start entresto 24/26 mg BID for CHF. Agree with bumex 1 mg daily, started by attending. If no further issues, cardiology will see PRN. Lifevest prior to discharge. F/u with Dr Selene Whalen for evaluation of ICD, soon.     Electronically signed by Monica Richmond PA-C on 10/31/2022 at 10:11 AM

## 2022-10-31 NOTE — PROGRESS NOTES
PROGRESS NOTE      Patient:  Dasha Crespo      Unit/Bed:3B-30/030-A    YOB: 1960    MRN: [id number]       Acct: [de-identified]     PCP: Anita Woody DO    Date of Admission: 10/29/2022      Assessment/Plan:    Anticipated Discharge in : needs lifevest for discharge    Active Hospital Problems    Diagnosis Date Noted    Hypotension [I95.9] 10/29/2022     Priority: Medium    Ventricular tachycardia [I47.20] 10/29/2022     Priority: Medium       Sustained Vtach: S/p defibrillation on 10/29/2022 with successful cardioversion to NSR. Has been on telemetry since admission, no reoccurring episodes since being placed in ICU.  -continue tele  -cardiology consulted, recommend life vest prior to discharge, start entresto 24/36 mg BID for CHF, continue bumex 1mg daily, restart metoprolol 25 mg daily. Continue plavix but cut statin in half due to LFTs lipitor 40 mg daily. -patient is to follow up with cardiology Dr. Nichole Calvillo for evaluation of ICD     Decompensated HFrEF (EF 25-20% 10/2022), CAD, HLD: Cardiology restarted patient on Toprol on 10/30. LifeVest ordered. -Cardiology consulted, following recs as above. Primary HTN: Home metoprolol restarted, follow cardiology recommendations as above     Moderate hypoosmolar hyponatremia, IMPROVING: Asymptomatic, likely due to chronic CHF. Continue to monitor. Anion gap metabolic acidosis, IMPROVING: Likely secondary to hypoperfusion during V. tach episode. Continue to monitor, lactic acid normalized. Repeat ABG shows improvement with now overcorrection to alkalosis. Continue to monitor. Transaminitis, IMPROVING: Likely secondary to hypoperfusion in the setting of hypotension during V. tach. Continue to monitor. Avoiding hepatic toxic agents at this time.  -continues to improve 10/31     T2DM, non-insulin-dependent, uncontrolled: Holding home meds in the inpatient setting. hypoglycemia protocols.   -hgbA1c 8.7 on 10/29, consider home metoprolol succinate  25 mg Oral Daily    atorvastatin  40 mg Oral Nightly    insulin lispro  0-4 Units SubCUTAneous TID     insulin lispro  0-4 Units SubCUTAneous Nightly    enoxaparin  40 mg SubCUTAneous Q24H    sodium chloride flush  5-40 mL IntraVENous 2 times per day    aspirin  325 mg Oral Daily    clopidogrel  75 mg Oral Daily     PRN Meds: sodium chloride flush, sodium chloride, ondansetron **OR** ondansetron, polyethylene glycol, acetaminophen **OR** acetaminophen, potassium chloride **OR** potassium chloride, magnesium sulfate, sodium phosphate IVPB **OR** sodium phosphate IVPB **OR** sodium phosphate IVPB, glucose, dextrose bolus **OR** dextrose bolus, glucagon (rDNA), dextrose      Intake/Output Summary (Last 24 hours) at 10/31/2022 0736  Last data filed at 10/30/2022 2024  Gross per 24 hour   Intake 1231.61 ml   Output 425 ml   Net 806.61 ml       Diet:  ADULT DIET; Regular; Low Sodium (2 gm)    Exam:  /66   Pulse 72   Temp 98.3 °F (36.8 °C) (Oral)   Resp 16   Wt 190 lb (86.2 kg)   SpO2 95%   BMI 27.26 kg/m²     General appearance: No apparent distress, appears stated age and cooperative, pleasant  HEENT: Normal cephalic, atraumatic without obvious deformity. Conjunctivae/corneas clear. Neck: No jugular venous distention. Trachea midline. Bandage over central line site. No erythema or drainage present. Respiratory:  Normal respiratory effort. Clear to auscultation, bilaterally without Rales/Wheezes/Rhonchi. Cardiovascular: Regular rate and rhythm with normal S1/S2 without murmurs, rubs or gallops. Abdomen: Soft, non-tender, non-distended with normal bowel sounds. Musculoskeletal:  No clubbing, cyanosis or edema bilaterally. Skin: Skin color, texture, turgor normal.  No rashes or lesions. Neurologic:  Neurovascularly intact .    Psychiatric: Alert and oriented, thought content appropriate, normal insight  Peripheral Pulses: +2 palpable, equal bilaterally       Labs:   Recent Labs 10/29/22  1630 10/30/22  0520 10/31/22  0332   WBC 9.6 8.7 7.2   HGB 12.5* 12.6* 12.2*   HCT 37.4* 37.4* 37.8*    266 277     Recent Labs     10/29/22  1039 10/30/22  0520 10/31/22  0332   * 131* 132*   K 5.0 4.3 4.7   CL 90* 96* 98   CO2 12* 18* 20*   BUN 41* 32* 28*   CREATININE 1.3* 0.8 0.8   CALCIUM 10.1 9.1 9.0   PHOS 7.3*  --   --      Recent Labs     10/29/22  1630 10/30/22  0520 10/31/22  0332   * 172* 68*   * 485* 326*   BILIDIR <0.2 <0.2 <0.2   BILITOT 0.4 0.5 0.4   ALKPHOS 186* 186* 166*     Recent Labs     10/29/22  1039   INR 1.35*     No results for input(s): CKTOTAL, TROPONINI in the last 72 hours. Urinalysis:      Lab Results   Component Value Date/Time    NITRU NEGATIVE 10/29/2022 01:21 PM    BLOODU NEGATIVE 10/29/2022 01:21 PM    SPECGRAV 1.020 06/19/2014 07:50 AM    GLUCOSEU >= 1000 10/29/2022 01:21 PM       Radiology:  XR CHEST PORTABLE   Final Result      Right internal jugular central venous line as above. **This report has been created using voice recognition software. It may contain minor errors which are inherent in voice recognition technology. **      Final report electronically signed by Dr. Beulah Herr on 10/29/2022 12:57 PM      XR CHEST PORTABLE   Final Result      Glidewire within a right internal jugular central venous line extending to the right upper abdomen. **This report has been created using voice recognition software. It may contain minor errors which are inherent in voice recognition technology. **      Final report electronically signed by Dr. Beulah Herr on 10/29/2022 12:53 PM      XR CHEST PORTABLE   Final Result   1. No acute intrathoracic process. 2. Moderate stable cardiomegaly. **This report has been created using voice recognition software. It may contain minor errors which are inherent in voice recognition technology. **      Final report electronically signed by Dr. Beulah Herr on 10/29/2022 12:06 PM          Diet: ADULT DIET; Regular;  Low Sodium (2 gm)    DVT prophylaxis: [x] Lovenox                                 [] SCDs                                 [] SQ Heparin                                 [] Encourage ambulation           [] Already on Anticoagulation     Disposition:    [x] Home       [] TCU       [] Rehab       [] Psych       [] SNF       [] [facility]       [] Other-    Code Status: Full Code      Electronically signed by Clayton Garcia MD on 10/31/2022 at 7:36 AM

## 2022-10-31 NOTE — PLAN OF CARE
Problem: Discharge Planning  Goal: Discharge to home or other facility with appropriate resources  Outcome: Progressing  Flowsheets (Taken 10/31/2022 0720)  Discharge to home or other facility with appropriate resources: Identify barriers to discharge with patient and caregiver  Note: Patient is going home when discharged. Life vest on this shift. Problem: Safety - Adult  Goal: Free from fall injury  Outcome: Progressing  Note: Patient is up independently. Call light within reach. Problem: Respiratory - Adult  Goal: Achieves optimal ventilation and oxygenation  Outcome: Progressing  Flowsheets (Taken 10/31/2022 0720)  Achieves optimal ventilation and oxygenation: Assess for changes in respiratory status  Note: Patient is on room air. Exertional dyspnea still noted. Bumex tablet started this shift. Problem: Cardiovascular - Adult  Goal: Maintains optimal cardiac output and hemodynamic stability  Outcome: Progressing  Flowsheets (Taken 10/31/2022 0720)  Maintains optimal cardiac output and hemodynamic stability: Monitor blood pressure and heart rate  Note: Patient remains sinus rhythm this shift.      Problem: Musculoskeletal - Adult  Goal: Return mobility to safest level of function  Outcome: Progressing  Flowsheets (Taken 10/31/2022 0720)  Return Mobility to Safest Level of Function: Assess patient stability and activity tolerance for standing, transferring and ambulating with or without assistive devices     Problem: Infection - Adult  Goal: Absence of infection at discharge  Outcome: Progressing  Flowsheets (Taken 10/31/2022 0720)  Absence of infection at discharge: Assess and monitor for signs and symptoms of infection     Problem: Metabolic/Fluid and Electrolytes - Adult  Goal: Electrolytes maintained within normal limits  Outcome: Progressing  Flowsheets (Taken 10/31/2022 0720)  Electrolytes maintained within normal limits: Monitor labs and assess patient for signs and symptoms of electrolyte imbalances  Note: Continue to monitor lab results. Problem: Metabolic/Fluid and Electrolytes - Adult  Goal: Glucose maintained within prescribed range  Outcome: Progressing  Flowsheets (Taken 10/31/2022 0720)  Glucose maintained within prescribed range: Monitor blood glucose as ordered     Problem: Pain  Goal: Verbalizes/displays adequate comfort level or baseline comfort level  Outcome: Progressing  Flowsheets (Taken 10/31/2022 0830)  Verbalizes/displays adequate comfort level or baseline comfort level: Encourage patient to monitor pain and request assistance     Problem: Chronic Conditions and Co-morbidities  Goal: Patient's chronic conditions and co-morbidity symptoms are monitored and maintained or improved  Outcome: Progressing  Flowsheets (Taken 10/31/2022 0720)  Care Plan - Patient's Chronic Conditions and Co-Morbidity Symptoms are Monitored and Maintained or Improved: Monitor and assess patient's chronic conditions and comorbid symptoms for stability, deterioration, or improvement  Note: Patient is on blood glucose monitoring. Insulin coverage per scale. Care plan reviewed to patient. Patient verbalized understanding and contribute to goal setting.

## 2022-10-31 NOTE — PROGRESS NOTES
Physician Progress Note      Grisel Valencia  CSN #:                  [id number]  :                       1960  ADMIT DATE:       10/29/2022 10:12 AM  [address] [place] DATE:  RESPONDING  PROVIDER #:        Mateusz Reed MD          QUERY TEXT:    Dr Veronica Salgado,    Pt admitted with [facility]. Per ED physician: Patient was admitted to ICU for   recent unstable ventricular tachycardia with concern for cardiogenic shock and   NSTEMI. If possible, please document in progress notes and discharge   summary:    The medical record reflects the following:  Risk Factors: post cardiac catheterization on 10/21/2022 with a drug-eluting   stent placed in the left circumflex artery. Clinical Indicators: Troponin 0.749, VT. Per ED physician: Patient was   admitted to ICU for recent unstable ventricular tachycardia with concern for   cardiogenic shock and NSTEMI. Treatment: Heparin, Levophed, ASA  Options provided:  -- Cardiogenic shock confirmed present on admission  -- NSTEMI confirmed present on admission  -- Cardiogenic shock & NSTEMI confirmed present on admission  -- Cardiogenic shock and NSTEMI ruled out.  -- Other - I will add my own diagnosis  -- Disagree - Not applicable / Not valid  -- Disagree - Clinically unable to determine / Unknown  -- Refer to Clinical Documentation Reviewer    PROVIDER RESPONSE TEXT:    The diagnosis of cardiogenic shock was confirmed as present on admission. Query created by: Netta Toscano on 10/31/2022 7:34 AM      Electronically signed by:   Mateusz Reed MD 10/31/2022 1:32 PM

## 2022-10-31 NOTE — DISCHARGE SUMMARY
DISCHARGE SUMMARY      Patient Identification:   Rasheeda Segundo   : 1960  MRN: [id number]   Account: [de-identified]      Patient's PCP: KENDRA Duong CNP    Admit Date: 10/29/2022     Discharge Date:   10/31/2022    Admitting Physician: No admitting provider for patient encounter. Discharge Physician: Red Melendez MD     Discharge Diagnoses:       Sustained Vtach: S/p defibrillation on 10/29/2022 with successful cardioversion to NSR. D/c with life vest-patient is to follow up with cardiology Dr. Isael Armendariz for evaluation of ICD  Decompensated HFrEF (EF 25-20% 10/2022), CAD, HLD: Cardiology restarted patient on Toprol on 10/30. Primary HTN  Moderate hypoosmolar hyponatremia, IMPROVING  Anion gap metabolic acidosis, IMPROVING  Transaminitis, IMPROVING   T2DM, non-insulin-dependent, uncontrolled  -hgbA1c 8.7 on 10/29, consider home medication changes at discharge with close pcp follow up. Hypotension, RESOLVED  Acute normocytic anemia  PETER, RESOLVED    The patient was seen and examined on day of discharge and this discharge summary is in conjunction with any daily progress note from day of discharge. Hospital Course:   Rasheeda Segundo is a 58 y.o. male admitted to [facility] on 10/29/2022 for shortness of breath and chest pain. HPI on admission: Pieter Fairbanks is a 60-year-old male current smoker with a past medical history of CAD status post CABG, CHF, COPD, diabetes, hyperlipidemia who presented to the emergency department complaining of shortness of breath and chest pain. He was in V. tach on telemetry, cardioverted at 160J. Successful. Right IJ placed for administration of norepinephrine. He is status post cardiac catheterization on 10/21/2022 with a drug-eluting stent placed in the left circumflex artery. There was a concern for possible reocclusion, cardiologist consulted and recommends medical management at this time. ICU admission for pressors.       Cardiology was consulted, recommended echo as well as IV amiodarone, IV Levophed and gentle diuresis as tolerated. On 10/30 symptoms had resolved, patient no longer requiring pressors, was stable and transferred to stepdown unit. On 10/31 patient was fitted for lifevest. His vitals were stable, labs were improved. He was discharged home with appropriate recommendations and follow ups in place. Exam:     Vitals:  Vitals:    10/31/22 0830 10/31/22 1115 10/31/22 1146 10/31/22 1514   BP: (!) 145/71 112/67 118/77 (!) 115/56   Pulse: 67 67 68 69   Resp: 18 18 16 18   Temp: 97.5 °F (36.4 °C) 97.7 °F (36.5 °C) 97.7 °F (36.5 °C) 97.5 °F (36.4 °C)   TempSrc: Oral Oral Oral Oral   SpO2: 96% 96% 98% 97%   Weight:         Weight: Weight: 190 lb (86.2 kg)     24 hour intake/output:  Intake/Output Summary (Last 24 hours) at 10/31/2022 1559  Last data filed at 10/31/2022 1540  Gross per 24 hour   Intake 720 ml   Output 1040 ml   Net -320 ml       General appearance: No apparent distress, appears stated age and cooperative, pleasant  HEENT: Normal cephalic, atraumatic without obvious deformity. Conjunctivae/corneas clear. Neck: No jugular venous distention. Trachea midline. Bandage over central line site. No erythema or drainage present. Respiratory:  Normal respiratory effort. Clear to auscultation, bilaterally without Rales/Wheezes/Rhonchi. Cardiovascular: Regular rate and rhythm with normal S1/S2 without murmurs, rubs or gallops. Abdomen: Soft, non-tender, non-distended with normal bowel sounds. Musculoskeletal:  No clubbing, cyanosis or edema bilaterally. Skin: Skin color, texture, turgor normal.  No rashes or lesions. Neurologic:  Neurovascularly intact . Psychiatric: Alert and oriented, thought content appropriate, normal insight  Peripheral Pulses: +2 palpable, equal bilaterally         Labs:  For convenience and continuity at follow-up the following most recent labs are provided:      CBC:    Lab Results   Component Value Date/Time    WBC 7.2 10/31/2022 03:32 AM    HGB 12.2 10/31/2022 03:32 AM    HCT 37.8 10/31/2022 03:32 AM     10/31/2022 03:32 AM       Renal:    Lab Results   Component Value Date/Time     10/31/2022 03:32 AM    K 4.7 10/31/2022 03:32 AM    CL 98 10/31/2022 03:32 AM    CO2 20 10/31/2022 03:32 AM    BUN 28 10/31/2022 03:32 AM    CREATININE 0.8 10/31/2022 03:32 AM    CALCIUM 9.0 10/31/2022 03:32 AM    PHOS 7.3 10/29/2022 10:39 AM         Significant Diagnostic Studies    Radiology:   XR CHEST PORTABLE   Final Result      Right internal jugular central venous line as above. **This report has been created using voice recognition software. It may contain minor errors which are inherent in voice recognition technology. **      Final report electronically signed by Dr. Rachel Khan on 10/29/2022 12:57 PM      XR CHEST PORTABLE   Final Result      Glidewire within a right internal jugular central venous line extending to the right upper abdomen. **This report has been created using voice recognition software. It may contain minor errors which are inherent in voice recognition technology. **      Final report electronically signed by Dr. Rachel Khan on 10/29/2022 12:53 PM      XR CHEST PORTABLE   Final Result   1. No acute intrathoracic process. 2. Moderate stable cardiomegaly. **This report has been created using voice recognition software. It may contain minor errors which are inherent in voice recognition technology. **      Final report electronically signed by Dr. Rachel Khan on 10/29/2022 12:06 PM             Consults:     IP CONSULT TO CARDIOLOGY  STR ED TO IP CONSULT    Disposition:    [x] Home       [] TCU       [] Rehab       [] Psych       [] SNF       [] [facility]       [] Other-    Condition at Discharge: Stable    Code Status:  Full Code     Patient Instructions:    Discharge lab work: LFTs to be followed by cardiology  Activity: activity as tolerated  Diet: ADULT DIET; Regular; Low Sodium (2 gm)      Follow-up visits:   Tamiko Camarillo, APRN - CNP  [address] [address]  [phone]    Follow up on 11/4/2022  Appointment time is: 10:45am    Jaja Peguero MD  [address]  [address]     Schedule an appointment as soon as possible for a visit in 2 week(s)  Office will be calling the patient to schedule. Please call the office in 2-3 days if you have not heard from them for appointment date. Discharge Medications:        Medication List        START taking these medications      bumetanide 1 MG tablet  Commonly known as: BUMEX  Take 1 tablet by mouth daily  Start taking on: November 1, 2022     sacubitril-valsartan 24-26 MG per tablet  Commonly known as: ENTRESTO  Take 1 tablet by mouth 2 times daily            CHANGE how you take these medications      atorvastatin 80 MG tablet  Commonly known as: Lipitor  Take 0.5 tablets by mouth nightly  What changed: how much to take            CONTINUE taking these medications      amLODIPine 5 MG tablet  Commonly known as: NORVASC  Take 1 tablet by mouth daily     aspirin 325 MG tablet     blood glucose test strips strip  Commonly known as: FREESTYLE LITE  TEST twice a day     clopidogrel 75 MG tablet  Commonly known as: PLAVIX  Take 1 tablet by mouth daily     FreeStyle Lancets Misc  TEST twice a day     metoprolol succinate 50 MG extended release tablet  Commonly known as: TOPROL XL  Take 0.5 tablets by mouth every morning     * nitroGLYCERIN 0.4 MG SL tablet  Commonly known as: NITROSTAT  Place 1 tablet under the tongue every 5 minutes as needed for Chest pain up to max of 3 total doses. If no relief after 1 dose, call 911.      * nitroGLYCERIN 0.4 MG SL tablet  Commonly known as: Nitrostat  Place 1 tablet under the tongue every 5 minutes as needed for Chest pain     Synjardy 5-1000 MG Tabs  Generic drug: Empagliflozin-metFORMIN HCl  Take 1 tablet BID           * This list has 2 medication(s) that are the same as other medications prescribed for you. Read the directions carefully, and ask your doctor or other care provider to review them with you. STOP taking these medications      losartan 50 MG tablet  Commonly known as: Cozaar               Where to Get Your Medications        These medications were sent to [address] , [address] [facility]  [address] [facility], [address] [place] [address] Road [facility]      Phone: [phone]   atorvastatin 80 MG tablet  bumetanide 1 MG tablet  sacubitril-valsartan 24-26 MG per tablet         Time Spent on discharge is more than 45 minutes in the examination, evaluation, counseling and review of medications and discharge plan. Signed: Thank you KENDRA Carreon CNP for the opportunity to be involved in this patient's care.     Electronically signed by Иван Love MD on 10/31/2022 at 3:59 PM

## 2022-11-02 NOTE — PLAN OF CARE
Hospital Facility-Based Program  [facility]/Traditional Cardiac Rehab  PHYSICIAN ORDER  Class I Level B based on research  Medical Director:  Dr. Bladimir Marquez MD     Patient Name: Rachael Cotton : 1960  Referring Physician: Dr. Fifi Elkins  Date: 2022  Allergies: Allergies as of 2022    (No Known Allergies)        Diagnosis:  PCI on 10/20/22    [x] [facility] with telemetry monitoring, resting and exercise        BPs & HRs with each session. Hospital setting for patient safety. [x] 72 sessions: 36 exercise sessions, 36 education sessions   [] 36 sessions: 18 exercise sessions, 18 education sessions  [] Traditional Cardiac Rehab with telemetry monitoring, resting and exercise BPs &       HRs with each session. Hospital setting for patient safety. [] 36 sessions:  32 exercise sessions, 4 education sessions     Per Patient symptoms, proceed with:   [x]Nitroglycerine 0.4mg SL every 5 minutes prn, maximum of 3, for chest pain   [x]12-lead EKG for symptoms of chest pain or noted change in heart rhythm   [x]Administer O2 per nasal cannula for symptoms of chest pain or acute dyspnea    Physician Prescribed Exercise:  Plan of Care:  Patient to attend exercise sessions with aerobic endurance and strength training for a total of 31-60 min/day, 3 days/week with supplemented 30+ minutes of aerobic exercise at home on days not participating in Cardiac Rehab. Aerobic Endurance Training  Aerobic Endurance mode (TM, AD, NS) starting at 5-8 minutes progressing by 2-3 minutes each week to a total of 15-30 minutes 2-3x/week. Arms only 5 min  Stair step increasing to 2 min  Resistance/strength training:  Hand weights starting at 1-5 lbs increasing in weight by 1-2 lbs and/or per patient tolerance weekly. Start with 8 repetitions and increase the repetitions each exercise session per patient tolerance for a total of 15 repetitions.   Measurable Endurance Goal: Aerobic endurance goal to be measured in minutes. Start endurance training per patient tolerance at 1-8 minutes per exercise type, progressing to a total of 31-60 minutes using various modes of training (see Exercise Prescription). Progression:    [x] Weekly 5-10% intensity progression, as tolerated, during cardiac rehab sessions. [x] 13-17 Rate of Perceived Exertion on the Gaby Scale (6-20). Measurable Muscular Strength Goal:  Starting at 1-5 lbs x 8 reps, progressing to 5-25 lbs x 15 reps per patient tolerance.       Electronically signed by Reddy Hinojosa PT on 11/2/2022 at 1:14 PM    Exercise Physiologist/Date/Time

## 2022-11-03 ENCOUNTER — TELEPHONE (OUTPATIENT)
Dept: PHARMACY | Age: 62
End: 2022-11-03

## 2022-11-03 NOTE — TELEPHONE ENCOUNTER
Called Patient for a 48 hour follow-up post meds-to-beds delivery. Introduced myself. Asked patient if they had any questions, concerns, or side effects that they would like to address. The following medications were addressed: Entresto and bumetanide. Patient express they had no questions, concerns, or side effects to note.

## 2022-11-06 ENCOUNTER — PATIENT MESSAGE (OUTPATIENT)
Dept: FAMILY MEDICINE CLINIC | Age: 62
End: 2022-11-06

## 2022-11-07 NOTE — TELEPHONE ENCOUNTER
From: Cj Tabares  To: Dr. Eric Crawford  Sent: 11/6/2022 3:58 PM EST  Subject: Appointment    Could you please schedule an appointment   you can call me at [phone]

## 2022-11-09 ENCOUNTER — NURSE ONLY (OUTPATIENT)
Dept: LAB | Age: 62
End: 2022-11-09

## 2022-11-09 DIAGNOSIS — I47.20 VENTRICULAR TACHYCARDIA: ICD-10-CM

## 2022-11-09 LAB
ALBUMIN SERPL-MCNC: 4.1 G/DL (ref 3.5–5.1)
ALP BLD-CCNC: 107 U/L (ref 38–126)
ALT SERPL-CCNC: 32 U/L (ref 11–66)
AST SERPL-CCNC: 16 U/L (ref 5–40)
BILIRUB SERPL-MCNC: 0.3 MG/DL (ref 0.3–1.2)
BILIRUBIN DIRECT: < 0.2 MG/DL (ref 0–0.3)
TOTAL PROTEIN: 7.7 G/DL (ref 6.1–8)

## 2022-11-10 ENCOUNTER — TELEPHONE (OUTPATIENT)
Dept: CARDIOLOGY CLINIC | Age: 62
End: 2022-11-10

## 2022-11-10 NOTE — TELEPHONE ENCOUNTER
Result note from Hepatic Panel  ----- Message from Latrell Sandoval PA-C sent at 11/10/2022  6:57 AM EST -----  Patient of [address]. Was in hospital for sustained VT. LFT were high so could not start amio. Would send results to [facility] office and mention possibility of starting, as LFT have returned to normal and was likely d/t to acute Vtach episode.   Will leave up to him

## 2022-11-14 ENCOUNTER — OFFICE VISIT (OUTPATIENT)
Dept: FAMILY MEDICINE CLINIC | Age: 62
End: 2022-11-14
Payer: MEDICAID

## 2022-11-14 VITALS
RESPIRATION RATE: 16 BRPM | SYSTOLIC BLOOD PRESSURE: 132 MMHG | DIASTOLIC BLOOD PRESSURE: 80 MMHG | HEART RATE: 68 BPM | WEIGHT: 203.8 LBS | BODY MASS INDEX: 29.24 KG/M2

## 2022-11-14 DIAGNOSIS — I25.10 CORONARY ARTERY DISEASE INVOLVING NATIVE CORONARY ARTERY OF NATIVE HEART WITHOUT ANGINA PECTORIS: ICD-10-CM

## 2022-11-14 DIAGNOSIS — J44.9 CHRONIC OBSTRUCTIVE PULMONARY DISEASE, UNSPECIFIED COPD TYPE (HCC): ICD-10-CM

## 2022-11-14 DIAGNOSIS — I47.20 VENTRICULAR TACHYARRHYTHMIA: Primary | ICD-10-CM

## 2022-11-14 DIAGNOSIS — E11.65 UNCONTROLLED TYPE 2 DIABETES MELLITUS WITH HYPERGLYCEMIA, WITHOUT LONG-TERM CURRENT USE OF INSULIN (HCC): ICD-10-CM

## 2022-11-14 DIAGNOSIS — I10 ESSENTIAL HYPERTENSION: ICD-10-CM

## 2022-11-14 DIAGNOSIS — E78.00 PURE HYPERCHOLESTEROLEMIA: ICD-10-CM

## 2022-11-14 PROCEDURE — 3023F SPIROM DOC REV: CPT | Performed by: FAMILY MEDICINE

## 2022-11-14 PROCEDURE — 3052F HG A1C>EQUAL 8.0%<EQUAL 9.0%: CPT | Performed by: FAMILY MEDICINE

## 2022-11-14 PROCEDURE — 99214 OFFICE O/P EST MOD 30 MIN: CPT | Performed by: FAMILY MEDICINE

## 2022-11-14 PROCEDURE — G8419 CALC BMI OUT NRM PARAM NOF/U: HCPCS | Performed by: FAMILY MEDICINE

## 2022-11-14 PROCEDURE — 3017F COLORECTAL CA SCREEN DOC REV: CPT | Performed by: FAMILY MEDICINE

## 2022-11-14 PROCEDURE — 2022F DILAT RTA XM EVC RTNOPTHY: CPT | Performed by: FAMILY MEDICINE

## 2022-11-14 PROCEDURE — 3074F SYST BP LT 130 MM HG: CPT | Performed by: FAMILY MEDICINE

## 2022-11-14 PROCEDURE — 4004F PT TOBACCO SCREEN RCVD TLK: CPT | Performed by: FAMILY MEDICINE

## 2022-11-14 PROCEDURE — 1111F DSCHRG MED/CURRENT MED MERGE: CPT | Performed by: FAMILY MEDICINE

## 2022-11-14 PROCEDURE — G8427 DOCREV CUR MEDS BY ELIG CLIN: HCPCS | Performed by: FAMILY MEDICINE

## 2022-11-14 PROCEDURE — G8484 FLU IMMUNIZE NO ADMIN: HCPCS | Performed by: FAMILY MEDICINE

## 2022-11-14 PROCEDURE — 3078F DIAST BP <80 MM HG: CPT | Performed by: FAMILY MEDICINE

## 2022-11-14 ASSESSMENT — PATIENT HEALTH QUESTIONNAIRE - PHQ9
1. LITTLE INTEREST OR PLEASURE IN DOING THINGS: 0
SUM OF ALL RESPONSES TO PHQ QUESTIONS 1-9: 0
SUM OF ALL RESPONSES TO PHQ QUESTIONS 1-9: 0
2. FEELING DOWN, DEPRESSED OR HOPELESS: 0
SUM OF ALL RESPONSES TO PHQ QUESTIONS 1-9: 0
SUM OF ALL RESPONSES TO PHQ9 QUESTIONS 1 & 2: 0
SUM OF ALL RESPONSES TO PHQ QUESTIONS 1-9: 0

## 2022-11-14 NOTE — PROGRESS NOTES
Post-Discharge Transitional Care Management Services      Julian Ochoa   YOB: 1960    Date of Visit:  11/14/2022  30 Day Post-Discharge Date: 12/1/22  Chief Complaint   Patient presents with    Diabetes    Follow-Up from [address] Date: 10/29/2022      Discharge Date:   10/31/2022     Admitting Physician: No admitting provider for patient encounter. Discharge Physician: Chase Murphy MD      Discharge Diagnoses:        Sustained Vtach: S/p defibrillation on 10/29/2022 with successful cardioversion to NSR. D/c with life vest-patient is to follow up with cardiology Dr. Sravani Woods for evaluation of ICD  Decompensated HFrEF (EF 25-20% 10/2022), CAD, HLD: Cardiology restarted patient on Toprol on 10/30. Primary HTN  Moderate hypoosmolar hyponatremia, IMPROVING  Anion gap metabolic acidosis, IMPROVING  Transaminitis, IMPROVING   T2DM, non-insulin-dependent, uncontrolled  -hgbA1c 8.7 on 10/29, consider home medication changes at discharge with close pcp follow up. Hypotension, RESOLVED  Acute normocytic anemia  PETER, RESOLVED     The patient was seen and examined on day of discharge and this discharge summary is in conjunction with any daily progress note from day of discharge. Hospital Course:   Julian Ochoa is a 58 y.o. male admitted to [address] [place] [address] on 10/29/2022 for shortness of breath and chest pain. HPI on admission: Sharmin Lynn is a 66-year-old male current smoker with a past medical history of CAD status post CABG, CHF, COPD, diabetes, hyperlipidemia who presented to the emergency department complaining of shortness of breath and chest pain. He was in V. tach on telemetry, cardioverted at 160J. Successful. Right IJ placed for administration of norepinephrine. He is status post cardiac catheterization on 10/21/2022 with a drug-eluting stent placed in the left circumflex artery.  There was a concern for possible reocclusion, cardiologist consulted and recommends medical management at this time. ICU admission for pressors. Cardiology was consulted, recommended echo as well as IV amiodarone, IV Levophed and gentle diuresis as tolerated. On 10/30 symptoms had resolved, patient no longer requiring pressors, was stable and transferred to stepdown unit. On 10/31 patient was fitted for lifevest. His vitals were stable, labs were improved. He was discharged home with appropriate recommendations and follow ups in place. No Known Allergies  Outpatient Medications Marked as Taking for the 11/14/22 encounter (Office Visit) with Sigifredo Sy, DO   Medication Sig Dispense Refill    linagliptin (TRADJENTA) 5 MG tablet Take 1 tablet by mouth daily 90 tablet 3    atorvastatin (LIPITOR) 80 MG tablet Take 0.5 tablets by mouth nightly 90 tablet 0    bumetanide (BUMEX) 1 MG tablet Take 1 tablet by mouth daily 30 tablet 3    amLODIPine (NORVASC) 5 MG tablet Take 1 tablet by mouth daily 30 tablet 3    metoprolol succinate (TOPROL XL) 50 MG extended release tablet Take 0.5 tablets by mouth every morning 45 tablet 3    clopidogrel (PLAVIX) 75 MG tablet Take 1 tablet by mouth daily 90 tablet 3    nitroGLYCERIN (NITROSTAT) 0.4 MG SL tablet Place 1 tablet under the tongue every 5 minutes as needed for Chest pain up to max of 3 total doses.  If no relief after 1 dose, call 911. 25 tablet 3    nitroGLYCERIN (NITROSTAT) 0.4 MG SL tablet Place 1 tablet under the tongue every 5 minutes as needed for Chest pain 25 tablet 3    Empagliflozin-metFORMIN HCl (SYNJARDY) 5-1000 MG TABS Take 1 tablet BID 60 tablet 5    aspirin 325 MG tablet Take 325 mg by mouth daily      glucose blood VI test strips (FREESTYLE LITE) strip TEST twice a day 100 strip 3    FREESTYLE LANCETS MISC TEST twice a day 100 each 3         Vitals:    11/14/22 1039   BP: 132/80   Site: Left Upper Arm   Position: Sitting   Cuff Size: Large Adult   Pulse: 68   Resp: 16   Weight: 203 lb 12.8 oz (92.4 kg)     Body mass long-term current use of insulin (HCC)  -     Hemoglobin A1C; Future    Coronary artery disease involving native coronary artery of native heart without angina pectoris    Chronic obstructive pulmonary disease, unspecified COPD type ([facility] [address]    Essential hypertension    Pure hypercholesterolemia    Other orders  -     linagliptin (TRADJENTA) 5 MG tablet;  Take 1 tablet by mouth daily        -  Chronic medical problems stable  -  Continue current medications, start Tradjenta to help with sugar control  -  Discussed weekly GLP-1 therapy, declines  -  Follow up with specialists as scheduled  -  Recheck A1C 3 mos  -  RTO 3 mos

## 2022-11-14 NOTE — PATIENT INSTRUCTIONS
You may receive a survey about your visit with us today. The feedback from our patients helps us identify what is working well and where the service to all patients can be enhanced. Thank you! Tobacco Cessation Programs     Telephonic behavior modification  1-800-QUIT-NOW (838-2305)  Counseling service for those who are ready to quit using tobacco.    Available for uninsured [place] residents, Medicaid recipients, pregnant women, or patients whose health plans or employers are members of the [address] [place] [address] behavior modification  http://[place]. [employer]uit[employer]. org  Online support program to help patients through each step of the quitting process. Available 24 hours a day 7 days a week. Provides up to date researched based tool, step-by-step guides, and motivational messages. Online behavior modification  www.lungusa.org/stop-smoking/how-to-quit  HelpLine: 1-800-LUNG-[place] (128-0999)  Email questions to:  Néstor@[employer]. "[employer]   Website offers resources to help tobacco users figure out their reasons for quitting and then take the big step of quitting for good. Hypnosis  Location: [facility] [address]  Contact: Marv Light, PhD at [phone]  Hypnosis for tobacco cessation  Cost $225 for the initial session and $175 for each session afterwards. Most patients require 6-8 sessions. There is the option to submit through the patients insurance. Hypnosis and behavior modification  Location: [place] [address],  [facility] [place] [address]  Contact: Stefania Power, PhD at [phone]  Counseling and hypnosis for nicotine addition  Cost: For uninsured patients:  Please call above phone number  Cost for insured patients depends on patients insurance plan.     Behavior modification  Location: [facility], [address] [facility] [address] [place] [address] 50: [phone]  Services include four one-on-one appointments between the patient and a respiratory therapist.  The four appointments span over three weeks. The respiratory therapist schedules one of the appointments to occur 48 hours after the patients quit date. Cost $100 total for the four sessions.   Tobacco cessation products are not included in the cost and are not provided by [facility].

## 2022-11-16 NOTE — TELEPHONE ENCOUNTER
Encounter closed by Dr. Angelica Franco staff and patient has an appt on 11/22 with Dr. Hamzah Garcia.

## 2022-11-22 ENCOUNTER — OFFICE VISIT (OUTPATIENT)
Dept: CARDIOLOGY CLINIC | Age: 62
End: 2022-11-22
Payer: MEDICAID

## 2022-11-22 VITALS
HEIGHT: 70 IN | WEIGHT: 197 LBS | HEART RATE: 80 BPM | DIASTOLIC BLOOD PRESSURE: 65 MMHG | RESPIRATION RATE: 22 BRPM | BODY MASS INDEX: 28.2 KG/M2 | SYSTOLIC BLOOD PRESSURE: 108 MMHG

## 2022-11-22 DIAGNOSIS — E78.00 PURE HYPERCHOLESTEROLEMIA: ICD-10-CM

## 2022-11-22 DIAGNOSIS — I25.10 CORONARY ARTERY DISEASE INVOLVING NATIVE CORONARY ARTERY OF NATIVE HEART WITHOUT ANGINA PECTORIS: Primary | ICD-10-CM

## 2022-11-22 DIAGNOSIS — I50.22 CHRONIC SYSTOLIC CONGESTIVE HEART FAILURE (HCC): ICD-10-CM

## 2022-11-22 PROCEDURE — G8419 CALC BMI OUT NRM PARAM NOF/U: HCPCS | Performed by: INTERNAL MEDICINE

## 2022-11-22 PROCEDURE — 3074F SYST BP LT 130 MM HG: CPT | Performed by: INTERNAL MEDICINE

## 2022-11-22 PROCEDURE — G8427 DOCREV CUR MEDS BY ELIG CLIN: HCPCS | Performed by: INTERNAL MEDICINE

## 2022-11-22 PROCEDURE — 3078F DIAST BP <80 MM HG: CPT | Performed by: INTERNAL MEDICINE

## 2022-11-22 PROCEDURE — G8484 FLU IMMUNIZE NO ADMIN: HCPCS | Performed by: INTERNAL MEDICINE

## 2022-11-22 PROCEDURE — 93000 ELECTROCARDIOGRAM COMPLETE: CPT | Performed by: INTERNAL MEDICINE

## 2022-11-22 PROCEDURE — 4004F PT TOBACCO SCREEN RCVD TLK: CPT | Performed by: INTERNAL MEDICINE

## 2022-11-22 PROCEDURE — 1111F DSCHRG MED/CURRENT MED MERGE: CPT | Performed by: INTERNAL MEDICINE

## 2022-11-22 PROCEDURE — 3017F COLORECTAL CA SCREEN DOC REV: CPT | Performed by: INTERNAL MEDICINE

## 2022-11-22 PROCEDURE — 99214 OFFICE O/P EST MOD 30 MIN: CPT | Performed by: INTERNAL MEDICINE

## 2022-11-22 RX ORDER — ATORVASTATIN CALCIUM 80 MG/1
40 TABLET, FILM COATED ORAL NIGHTLY
Qty: 90 TABLET | Refills: 3 | Status: SHIPPED | OUTPATIENT
Start: 2022-11-22

## 2022-11-22 RX ORDER — AMLODIPINE BESYLATE 5 MG/1
5 TABLET ORAL DAILY
Qty: 90 TABLET | Refills: 3 | Status: SHIPPED | OUTPATIENT
Start: 2022-11-22

## 2022-11-22 RX ORDER — BUMETANIDE 1 MG/1
1 TABLET ORAL DAILY
Qty: 90 TABLET | Refills: 3 | Status: SHIPPED | OUTPATIENT
Start: 2022-11-22

## 2022-11-22 RX ORDER — METOPROLOL SUCCINATE 25 MG/1
25 TABLET, EXTENDED RELEASE ORAL DAILY
COMMUNITY
End: 2022-11-22 | Stop reason: SDUPTHER

## 2022-11-22 RX ORDER — METOPROLOL SUCCINATE 25 MG/1
25 TABLET, EXTENDED RELEASE ORAL DAILY
Qty: 90 TABLET | Refills: 3 | Status: SHIPPED | OUTPATIENT
Start: 2022-11-22

## 2022-11-22 ASSESSMENT — ENCOUNTER SYMPTOMS
CHEST TIGHTNESS: 0
SHORTNESS OF BREATH: 1
STRIDOR: 0
VOICE CHANGE: 0
VOMITING: 0
TROUBLE SWALLOWING: 0
WHEEZING: 0
COLOR CHANGE: 0
NAUSEA: 0
CHOKING: 0
ABDOMINAL PAIN: 0
ABDOMINAL DISTENTION: 0
COUGH: 0
BLOOD IN STOOL: 0
ANAL BLEEDING: 0
APNEA: 0

## 2022-11-22 NOTE — PROGRESS NOTES
[address]  Dept: [address]  Loc: [phone]     11/22/2022       Blanca Juan is here today for   Chief Complaint   Patient presents with    Follow-up           Referring Physician:  No ref. provider found     Patient Active Problem List   Diagnosis    COPD (chronic obstructive pulmonary disease) (HCC)    CAD (coronary artery disease)    Post PTCA with MI    Hyperlipidemia    HTN (hypertension)    Tobacco abuse    Polyp of colon    Chest pain syndrome    Abnormal stress ECG with treadmill    Diabetes mellitus type II, uncontrolled    S/P CABG x 2    Uncontrolled type 2 diabetes mellitus, without long-term current use of insulin    Mild left ventricular systolic dysfunction    Dizzinesses    Angina pectoris, crescendo (HCC)    Hypotension    Ventricular tachycardia       Review of Systems   Constitutional:  Negative for activity change, appetite change, fatigue, fever and unexpected weight change. HENT:  Negative for congestion, trouble swallowing and voice change. Eyes:  Negative for visual disturbance. Respiratory:  Positive for shortness of breath. Negative for apnea, cough, choking, chest tightness, wheezing and stridor. Cardiovascular:  Negative for chest pain, palpitations and leg swelling. Gastrointestinal:  Negative for abdominal distention, abdominal pain, anal bleeding, blood in stool, nausea and vomiting. Endocrine: Negative for cold intolerance and heat intolerance. Genitourinary:  Negative for hematuria. Musculoskeletal:  Negative for arthralgias, gait problem, joint swelling and myalgias. Skin:  Negative for color change and rash. Allergic/Immunologic: Negative for environmental allergies and food allergies. Neurological:  Negative for dizziness, tremors, syncope, facial asymmetry, weakness, light-headedness, numbness and headaches.    Hematological: Does not bruise/bleed easily. Psychiatric/Behavioral:  Negative for agitation, behavioral problems and sleep disturbance. Past Medical History:   Diagnosis Date    CAD (coronary artery disease)     CHF (congestive heart failure) ([facility])     COPD (chronic obstructive pulmonary disease) ([facility])     Diabetes mellitus ([facility])     Hyperlipidemia     PONV (postoperative nausea and vomiting)     Sleep apnea     has CPAP doesn't wear       No Known Allergies    Current Outpatient Medications   Medication Sig Dispense Refill    metoprolol succinate (TOPROL XL) 25 MG extended release tablet Take 1 tablet by mouth daily 90 tablet 3    atorvastatin (LIPITOR) 80 MG tablet Take 0.5 tablets by mouth nightly 90 tablet 3    bumetanide (BUMEX) 1 MG tablet Take 1 tablet by mouth daily 90 tablet 3    amLODIPine (NORVASC) 5 MG tablet Take 1 tablet by mouth daily 90 tablet 3    linagliptin (TRADJENTA) 5 MG tablet Take 1 tablet by mouth daily 90 tablet 3    sacubitril-valsartan (ENTRESTO) 24-26 MG per tablet Take 1 tablet by mouth 2 times daily 60 tablet 0    clopidogrel (PLAVIX) 75 MG tablet Take 1 tablet by mouth daily 90 tablet 3    nitroGLYCERIN (NITROSTAT) 0.4 MG SL tablet Place 1 tablet under the tongue every 5 minutes as needed for Chest pain 25 tablet 3    Empagliflozin-metFORMIN HCl (SYNJARDY) 5-1000 MG TABS Take 1 tablet BID 60 tablet 5    aspirin 325 MG tablet Take 325 mg by mouth daily      glucose blood VI test strips (FREESTYLE LITE) strip TEST twice a day 100 strip 3    FREESTYLE LANCETS MISC TEST twice a day 100 each 3     No current facility-administered medications for this visit.        Social History     Socioeconomic History    Marital status:      Spouse name: Herve Peguero    Number of children: 2    Years of education: 12    Highest education level: High school graduate   Tobacco Use    Smoking status: Every Day     Packs/day: 0.25     Years: 32.00     Pack years: 8.00     Types: Cigarettes    Smokeless tobacco: Never   Vaping Use    Vaping Use: Never used   Substance and Sexual Activity    Alcohol use: No     Alcohol/week: 0.0 standard drinks    Drug use: No    Sexual activity: Yes     Partners: Female     Social Determinants of Health     Financial Resource Strain: Low Risk     Difficulty of Paying Living Expenses: Not very hard   Transportation Needs: No Transportation Needs    Lack of Transportation (Medical): No    Lack of Transportation (Non-Medical): No   Physical Activity: Inactive    Days of Exercise per Week: 0 days    Minutes of Exercise per Session: 0 min   Stress: No Stress Concern Present    Feeling of Stress : Only a little   Housing Stability: Low Risk     Unable to Pay for Housing in the Last Year: No    Number of Places Lived in the Last Year: 1    Unstable Housing in the Last Year: No       Family History   Problem Relation Age of Onset    High Blood Pressure Mother     Heart Disease Father     High Blood Pressure Sister     Heart Disease Brother     Heart Disease Brother     Diabetes Paternal Cousin     Cancer Neg Hx     Stroke Neg Hx        Blood pressure 108/65, pulse 80, resp. rate 22, height 5' 10\" (1.778 m), weight 197 lb (89.4 kg).     Physical Exam:    General Appearance: alert and oriented to person, place and time, in no acute distress  Cardiovascular: normal rate, regular rhythm, normal S1 and S2, no murmurs, rubs, clicks, or gallops, distal pulses intact, no carotid bruits, no JVD  Pulmonary/Chest: clear to auscultation bilaterally- no wheezes, rales or rhonchi, normal air movement, no respiratory distress  Abdomen: soft, non-tender, non-distended, normal bowel sounds, no masses   Extremities: no cyanosis, clubbing or edema, pulse   Skin: warm and dry, no rash or erythema  Head: normocephalic and atraumatic  Eyes: pupils equal, round, and reactive to light  Neck: supple and non-tender without mass, no thyromegaly   Musculoskeletal: normal range of motion, no joint swelling, deformity or tenderness  Neurological: alert, oriented, normal speech, no focal findings or movement disorder noted    Lab Data:    Cardiac Enzymes:  No results for input(s): CKTOTAL, CKMB, CKMBINDEX, TROPONINI in the last 72 hours. CBC:   Lab Results   Component Value Date/Time    WBC 7.2 10/31/2022 03:32 AM    RBC 3.83 10/31/2022 03:32 AM    RBC 4.19 09/02/2011 07:06 AM    HGB 12.2 10/31/2022 03:32 AM    HCT 37.8 10/31/2022 03:32 AM     10/31/2022 03:32 AM       CMP:    Lab Results   Component Value Date/Time     10/31/2022 03:32 AM    K 4.7 10/31/2022 03:32 AM    CL 98 10/31/2022 03:32 AM    CO2 20 10/31/2022 03:32 AM    BUN 28 10/31/2022 03:32 AM    CREATININE 0.8 10/31/2022 03:32 AM    AGRATIO 1.8 11/18/2015 08:16 AM    LABGLOM >60 10/31/2022 12:16 AM    GLUCOSE 206 10/31/2022 03:32 AM    GLUCOSE 139 11/18/2015 08:16 AM    CALCIUM 9.0 10/31/2022 03:32 AM       Hepatic Function Panel:    Lab Results   Component Value Date/Time    ALKPHOS 107 11/09/2022 12:45 PM    ALT 32 11/09/2022 12:45 PM    AST 16 11/09/2022 12:45 PM    PROT 7.7 11/09/2022 12:45 PM    BILITOT 0.3 11/09/2022 12:45 PM    BILIDIR <0.2 11/09/2022 12:45 PM    LABALBU 4.1 11/09/2022 12:45 PM    LABALBU 4.0 09/02/2011 07:06 AM       Magnesium:    Lab Results   Component Value Date/Time    MG 2.5 10/29/2022 10:39 AM       PT/INR:    Lab Results   Component Value Date/Time    INR 1.35 10/29/2022 10:39 AM       HgBA1c:    Lab Results   Component Value Date/Time    LABA1C 8.7 10/29/2022 10:39 AM       FLP:    Lab Results   Component Value Date/Time    TRIG 135 11/29/2021 07:43 AM    HDL 46 11/29/2021 07:43 AM    LDLCALC 163 11/29/2021 07:43 AM    LDLDIRECT 103 10/31/2015 08:57 AM       TSH:    Lab Results   Component Value Date/Time    TSH 2.290 11/29/2021 07:43 AM        Diagnosis Orders   1. Coronary artery disease involving native coronary artery of native heart without angina pectoris  97748 - FL ELECTROCARDIOGRAM, COMPLETE      2.  Pure hypercholesterolemia  atorvastatin (LIPITOR) 80 MG tablet      3. Chronic systolic congestive heart failure (HCC)  Echo limited           Assessment/Plan    58years old gentleman with a history of coronary artery disease has a history of CABG recently he underwent a heart cath and intervention of the circumflex patient had systolic dysfunction of the left ventricle he had the LifeVest jacket on. The patient located and had atypical jaw pain and occasional shortness of breath. The patient will continue with the current medication he will be seen again mid January at that time we will repeat the echo to reevaluate the left ventricular function and possible need for an AICD in the meantime he will continue with the LifeVest jacket. He will continue with the current medication    He has a history of diabetes and his diabetes has been under control. He has been on a dose of the Entresto as his blood pressure is low he is trying to quit smoking he was encouraged to do so. The patient medication and the plan of treatment were discussed with him in great details all his question were answered medication reviewed and he will be seen in 8 weeks thank    Orders Placed This Encounter   Procedures    Echo limited     Standing Status:   Future     Standing Expiration Date:   11/22/2023     Order Specific Question:   Reason for exam:     Answer:   lv function    [id number] - DE ELECTROCARDIOGRAM, COMPLETE       Return in about 8 weeks (around 1/17/2023) for cad.      Kristin Suarez MD

## 2022-11-28 ENCOUNTER — HOSPITAL ENCOUNTER (OUTPATIENT)
Dept: NON INVASIVE DIAGNOSTICS | Age: 62
Discharge: HOME | End: 2022-11-28
Payer: MEDICAID

## 2022-11-28 DIAGNOSIS — I50.22 CHRONIC SYSTOLIC CONGESTIVE HEART FAILURE (HCC): ICD-10-CM

## 2022-11-28 PROCEDURE — 93307 TTE W/O DOPPLER COMPLETE: CPT

## 2022-12-01 ENCOUNTER — HOSPITAL ENCOUNTER (OUTPATIENT)
Dept: CARDIAC REHAB | Age: 62
Setting detail: THERAPIES SERIES
Discharge: HOME | End: 2022-12-01
Payer: MEDICAID

## 2022-12-01 VITALS — BODY MASS INDEX: 28.2 KG/M2 | WEIGHT: 197 LBS | HEIGHT: 70 IN

## 2022-12-01 PROCEDURE — G0422 INTENS CARDIAC REHAB W/EXERC: HCPCS

## 2022-12-01 PROCEDURE — G0423 INTENS CARDIAC REHAB NO EXER: HCPCS

## 2022-12-01 NOTE — CARDIO/PULMONARY
Video Education Report - ICR/CR  Name:  Annabelle Zepeda     Date:  12/1/2022  MRN: [id number]     Session #:  1  Session Length: 40 min    Core Videos        []Heart Disease Risk Reduction       [x]Overview of Pritikin Eating Plan      []Move it          []Calorie Density         []Healthy Minds, Bodies, Hearts        []Label Reading - Part 1       []Metabolic Syndrome and Belly Fat        []How Our Thoughts Can Heal Our Hearts   []Dining Out - Part 1      []Biomechanical Limitations  []Facts on Fat        []Hypertension & Heart Disease    []Diseases of Our Time - Focusing on Diabetes   []Body Composition  []Nutrition Action Plan    []Exercise Action Plan    Exercise      Healthy Mind-Set  []Improving Performance    []Smoking Cessation    []Introduction to [address]  []Aging-Enhancing the Quality of Your Life  []Becoming a Pritikin   []Biology of Weight Control    []Cooking Breakfasts   []Decoding Lab Results    and Snacks  []Diseases of Our Time - Overview   []Cooking Dinner and   []Sleep Disorders     Sides  []Targeting Your Nutrition Priorities   []Healthy Salads &   Dressings  Nutrition      []Cooking Soups and   []Dining Out - Part 2     Desserts  []Fueling a Healthy Body   []Menu Workshop     Overview  []Planning Your Eating Strategy   []The Pritikin Solution  []Vitamins and Minerals    Comments:  Video completed, group discussion

## 2022-12-01 NOTE — PLAN OF CARE
[address] [place] [address] Facility-Based Program  Individualized Cardiac Treatment Plan    Patient Name:  Benjamin Delgado  :  1960  Age:  58 y.o. MRN:  [id number]  Diagnosis: PCI  Date of Event: 10/20/22   Physician:  Donna Villagomez  Next Office Visit:    Date Entered Program: 22  Risk Stratifications: [] Low [] Intermediate [x] High  Allergies: Not on File    COVID -19 Screen  Do you have any of the following symptoms:  [] Fever [] Cough [] SOB [] Muscle/Body Ache [] Loss of taste/smell [x] None  []  Have you traveled outside of the [place]? [] Yes      x No    Have you been around anyone who has tested Positive for COVID 19?  [] Yes      [x] No    The following Education has been completed with patient. xWait in the lobby until we call you back to rehab. [x][] Bring your own bottle of water with you.       Individual Cardiac Treatment Plan -EXERCISE  INITIAL 30 DAY 61 DAY 90  DAY FINAL DAY   EXERCISE  ASSESSMENT/PLAN EXERCISE  REASSESSMENT EXERCISE   REASSESSMENT EXERCISE   REASSESSMENT EXERCISE   REASSESSMENT EXERCISE  DISCHARGE/FOLLOW-UP   Date: 22 Date: Date: Date: Date: Date:   Session #1  First Exercise Session:  ** Session # ** Session # ** Session # ** Session # ** Session # **  Last Exercise Session:  **   Stages of Change Stages of Change Stages of Change Stages of Change Stages of Change Stages of Change   [x] Pre Contemplation  [] Contemplation  [] Preparation  [] Action  [] Maintenance  [] Relapse [] Pre Contemplation  [] Contemplation  [] Preparation  [] Action  [] Maintenance  [] Relapse [] Pre Contemplation  [] Contemplation  [] Preparation  [] Action  [] Maintenance  [] Relapse [] Pre Contemplation  [] Contemplation  [] Preparation  [] Action  [] Maintenance  [] Relapse [] Pre Contemplation  [] Contemplation  [] Preparation  [] Action  [] Maintenance  [] Relapse [] Pre Contemplation  [] Contemplation  [] Preparation  [] Action  [] Maintenance  [] Relapse   EXERCISE ASSESSMENT EXERCISE ASSESSMENT EXERCISE ASSESSMENT EXERCISE ASSESSMENT EXERCISE ASSESSMENT EXERCISE ASSESSMENT   6 Min Walk Test  Distance walked:   0.14 miles  739 ft.  2.1 METs  Max HR:108 BPM      RPE:  12    THR:  116-130  Rhythm:  NSR with occ PVC     6 Min Walk Test  Distance walked:   ** miles  ** ft  ** METs  Max HR:** BPM      RPE:  **  %Change ft= **    Rhythm:  **   DASI: 4.7 METs DASI: ** METs DASI: ** METs DASI: ** METs DASI: ** METs DASI: ** METs   Return to Work  [employer] on returning to work? [] Yes              [] No   [x] Disabled     [x] Retired     Return to work: Has Pili Pop returned to work? [] Yes    [] No    Return to work date set? [] Yes, **    [] No    [employer] is doing ** at work. Return to work: Has Pili Pop returned to work? [] Yes    [] No    Return to work date set? [] Yes, **    [] No    [employer] is doing ** at work. Return to work: Has [employer]ili Pop returned to work? [] Yes    [] No    Return to work date set? [] Yes, **    [] No    [employer] is doing ** at work. Return to work: Has [employer] returned to work? [] Yes    [] No    Return to work date set? [] Yes, **    [] No    [employer]ili Pop is doing ** at work. Return to work: Has Pili Pop returned to work? [] Yes    [] No    Return to work? [] Yes, **    [] No    *Required MET Level achieved for job duties?    [] Yes    [] No   Orthopedic Limitations/  [x] Yes    [] No  If yes please list:  foot pain     Orthopedic Limitations  *If patient has orthopedic issue:   Actions/  accomodations needed to make Pili Pop successful : Orthopedic Limitations   Orthopedic Limitations   Orthopedic Limitations Orthopedic Limitations     Fall Risk    [x]Assessed as a fall risk  [] Not a fall risk      [x] Balance Issues       [] Adriana Benitez        [] Cane       [] Wheelchair  Actions needed:  encouraged to use a cane if unbalanced    [x] Safety issues reviewed      Fall Risk  *If patient is a fall risk, action needed to accommodate:  Fall Risk Fall Risk Fall Risk Fall Risk   Home Exercise  [] Yes    [x] No   Home Exercise  [] Yes    [] No  Type: **  Frequency:**  Duration: ** Home Exercise  [] Yes    [] No  Type: **  Frequency: **  Duration: ** Home Exercise  [] Yes    [] No  Type: **  Frequency: **  Duration: ** Home Exercise  [] Yes    [] No  Type: **  Frequency: **  Duration: ** Home Exercise  [] Yes    [] No  Type: **  Frequency: **  Duration: **   Angina with Activity? [x] Yes    [] No  Angina Management: rest, nitro Angina with Activity? [] Yes    [] No  Angina Management: ** Angina with Activity? [] Yes    [] No  Angina Management: ** Angina with Activity? [] Yes    [] No  Angina Management: ** Angina with Activity? [] Yes    [] No  Angina Management: ** Angina with Activity?   [] Yes    [] No  Angina Management: **   EXERCISE PLAN EXERCISE PLAN EXERCISE PLAN EXERCISE PLAN EXERCISE PLAN EXERCISE PLAN   *Interventions* *Interventions* *Interventions* *Interventions* *Interventions* *Interventions*   Exercise Prescription  (per physician & CR staff) Exercise Prescription  (per physician & CR staff) Exercise Prescription  (per physician & CR staff) Exercise Prescription  (per physician & CR staff) Exercise Prescription  (per physician & CR staff) Exercise Prescription  (per physician & CR staff)   Cardiovascular Cardiovascular Cardiovascular Cardiovascular Cardiovascular Cardiovascular   Mode:    [x] Treadmill (TM)  [x] Schwinn Airdyne (AD)  [x] Arms Ergometer (AE)  [] NuStep  [] Elliptical (E) MODE:    [] Treadmill (TM)  [] Schwinn Airdyne (AD)  [] Arms Ergometer (AE)  [] NuStep  [] Elliptical (E) MODE:    [] Treadmill (TM)  [] Schwinn Airdyne (AD)  [] Arms Ergometer (AE)  [] NuStep  [] Elliptical (E) MODE:    [] Treadmill (TM)  [] Schwinn Airdyne (AD)  [] Arms Ergometer (AE)  [] NuStep  [] Elliptical (E) MODE:    [] Treadmill (TM)  [] Schwinn Airdyne (AD)  [] Arms Ergometer (AE)  [] NuStep  [] Elliptical (E) MODE:    [] Treadmill (TM)  [] Schwinn Airdyne (AD)  [] Arms Ergometer (AE)  [] NuStep  [] Elliptical (E)   Initial Workloads  TM: [email or web address] 2.1 METs  AD: 0.7 level = 2.1 METs  NS: 36  Golden= 2.1 METs  AE: 0.4 level = 1.6 METs Current Workloads  TM:  @ %=  METs  AD:  level =  METs  NS:   Golden=  METs  AE:  level =  METs Current Workloads  TM:  @ %=  METs  AD:  level =  METs  NS:   Golden=  METs  AE:  level =  METs Current Workloads  TM:  @ %=  METs  AD:  level =  METs  NS:   Golden=  METs  AE:  level =  METs Current Workloads  TM:  @ %=  METs  AD:  level =  METs  NS:   Golden=  METs  AE:  level =  METs Current Workloads  TM:  @ %=  METs  AD:  level =  METs  NS:   Golden=  METs  AE:  level =  METs     Frequency:    ICR: 3x/week  Home: 2-3x/wk Frequency:   ICR: 3x/week  Home: 3x/wk Frequency:  ICR: 3x/week  Home: 3-4x/wk Frequency:  ICR: 3x/week  Home: 3-4x/wk Frequency:  ICR: 3x/week  Home: 3-4x/wk Frequency:  Mili Antony will continue exercise at  5-7 days/week   Duration:   Total aerobic exercise = 30-45 min    5-8 min/mode Duration:  Total aerobic exercises = ** min     **min/mode Duration:  Total aerobic exercises = ** min     **min/mode Duration:  Total aerobic exercises = ** min     **min/mode Duration:  Total aerobic exercises = ** min     **min/mode Duration:  Total erobic exercise =  60-90 min   Intensity:   MET Level = 2.1  RPE = 12-15 Intensity:  Max MET Level = **  RPE = 12-15 Intensity:  Max MET Level = **  RPE = 12-15 Intensity:  Max MET Level = **  RPE = 12-15 Intensity:  Max MET Level = **  RPE = 12-15 Intensity:  Max MET Level = ** RPE = 12-15   Progression: increase aerobic activity up to 15 min over next 4 weeks by increasing time 2-3 min/week.  Progression:   Progression:   Progression: Progression: Progression:  Increase time/intensity when RPE <13, and HR is in [facility]   [x] Yes      [] No  Upper and Lower body strength training 2x/wk    Wt: 2#       Reps:  8-15    *Increase wt. after completing 15 reps with an RPE of <12/13. [] Yes      [] No  Upper and Lower body strength training 2x/wk    Wt: **#       Reps:  8-15    *Increase wt. after completing 15 reps with an RPE of <12/13. [] Yes      [] No  Upper and Lower body strength training 2x/wk    Wt: **#       Reps:  8-15    *Increase wt. after completing 15 reps with an RPE of <12/13. [] Yes      [] No  Upper and Lower body strength training 2x/wk    Wt: **#       Reps:  8-15    *Increase wt. after completing 15 reps with an RPE of <12/13. [] Yes      [] No  Upper and Lower body strength training 2x/wk    Wt: **#       Reps:  8-15    *Increase wt. after completing 15 reps with an RPE of <12/13. Continue Strength Training at home   [] Exercise Log & Strength training handout given     Wt: **#       Reps:  8-15    *Increase wt. after completing 15 reps with an RPE of <12/13. Flexibility Flexibility Flexibility Flexibility Flexibility Flexibility   [x] Yes      [] No  25 min session of Core Strength & Flexibility 1x/per week  Attends Core Strength & Flexibility   [] Yes      [] No Attends Core Strength & Flexibility   [] Yes      [] No Attends Core Strength & Flexibility   [] Yes      [] No Attends Core Strength & Flexibility   [] Yes      [] No Continue Core Strength & Flexibility at home   Home Exercise  *Annalise Melvin planning to walk 3-4 days/week for 5-10 min on days not in rehab. Home Exercise  *Madan Seat verbalizes planning to ** ** days/week for ** min on days not in rehab. Home Exercise  *Madan Seat verbalizes planning to ** ** days/week for ** min on days not in rehab. Home Exercise  *Madan Seat verbalizes planning to ** ** days/week for ** min on days not in rehab. Home Exercise  *Madan Seat verbalizes planning to ** ** days/week for ** min on days not in rehab.  Home Exercise  *Madan Seat verbalizes his/her plan to ** ** days/week for ** min @ ** *Education* *Education* *Education* *Education* *Education* *Education*   RPE Scale  [x] Yes      [] No  Exercise Safety  [x] Yes      [] No  Equipment Orientation  [x] Yes      [] No  S/S to Report  [x] Yes      [] No  Warm Up/Cool Down  [x] Yes      [] No  Home Exercise  [x] Yes      [] No     All Exercise Education Completed  [] Yes      [] No   Exercise Education Recommended    Workshops  [x] Benefits of Exercise  [x] Balance Training & Fall Prevention  [x] Heart Disease Risk Reduction  [x] Yoga & Heart Health Exercise Education Attended/Date Exercise Education Attended/Date Exercise Education Attended/Date Exercise Education Attended/Date All Sessions Completed? [] Yes  [] No   *Goals* *Goals* *Goals* *Goals* *Goals* *Goals*   Initial Exercise Goals Exercise Goals  Exercise Goals   Exercise Goals  Exercise Goals  Exercise Goals   Joseph Wang plans to:  [x] Attend exercise sessions 3x/wk  [x] initiate home exercise 2-3x/wk for 10-20 min  [x] Increase 6 min walk distance by 10%  [x] Attend Exercise workshops Joseph Wang plans to:  [x] Attend exercise sessions 3x/wk  [x] continue home exercise 2-3x/wk for 20-30 min  [x] Attend Exercise workshops Tereso's plans to:  [x] Attend exercise sessions 3x/wk  [x] continue home exercise 3-4x/wk for 30-45 min  [x] Determine plan of exercise following rehab  [x] Attend Exercise workshops Tereso's plans to:  [x] Attend exercise sessions 3x/wk  [x] continue home exercise 3-4x/wk for 30-45 min  [x] Determine plan of exercise following rehab  [x] Attend Exercise workshops Tereso's plans to:  [x] Attend exercise sessions 3x/wk  [x] continue home exercise 3-4x/wk for 30-45 min  [x] Determine plan of exercise following rehab  [x] Attend Exercise workshops Joseph Wang achieved exercise goals?     []  Yes    [] No  If no, why?  **  [] Increased 6 min walk distance by 10%  [] Currently exercising 30-60 min/day, 5-7days/wk   [] Plans to continue exercise on own  [] Plans to join a local fitness center to continue exercise  [] Does not plan to continue to exercise after rehab   Mutually agreed upon goals:  Hayley Umana would like to have strength and engery to play with grand kids Progress towards mutually agreed upon goals:  Hayley Ban  ** Progress towards mutually agreed upon goals:  Hayley Ban  ** Progress towards mutually agreed upon goals:  Hayley Ban ** Progress towards mutually agreed upon goals:  Hayley Ban  ** Progress towards mutually agreed upon goals:  Hayley Ban  **    *MET level required for above goal:  4-5 METs MET level Achieved:  **METs MET level Achieved:  **METs MET level Achieved:  **METs MET level Achieved:  **METs MET level Achieved:  **METs     Individual Cardiac Treatment Plan - Nutrition  NUTRITION  ASSESSMENT/PLAN NUTRITION  REASSESSMENT NUTRITION   REASSESSMENT NUTRITION   REASSESSMENT NUTRITION  DISCHARGE/FOLLOW-UP NUTRITION  DISCHARGE/FOLLOW-UP   Stages of Change Stages of Change Stages of Change Stages of Change Stages of Change Stages of Change   [] Pre Contemplation  [x] Contemplation  [] Preparation  [] Action  [] Maintenance  [] Relapse [] Pre Contemplation  [] Contemplation  [] Preparation  [] Action  [] Maintenance  [] Relapse [] Pre Contemplation  [] Contemplation  [] Preparation  [] Action  [] Maintenance  [] Relapse [] Pre Contemplation  [] Contemplation  [] Preparation  [] Action  [] Maintenance  [] Relapse [] Pre Contemplation  [] Contemplation  [] Preparation  [] Action  [] Maintenance  [] Relapse [] Pre Contemplation  [] Contemplation  [] Preparation  [] Action  [] Maintenance  [] Relapse   NUTRITION ASSESSMENT NUTRITION ASSESSMENT NUTRITION ASSESSMENT NUTRITION ASSESSMENT NUTRITION ASSESSMENT NUTRITION ASSESSMENT   Weight Management  Weight: 197        Height: 5'10\"   BMI: 28.3  Weight Management  Weight: **                  Weight Management  Weight: **                  Weight Management  Weight: ** Weight Management  Weight: ** Weight Management  Weight: ** BMI: **   Eating Plan  Current eating habits: try to follow diabetic diet Eating Plan  Changes: Eating Plan  Changes: Eating Plan  Changes: Eating Plan  Changes: Eating Plan Improvements:   Alcohol Use  [x] none          [] daily  [] weekly      [] special           Diet Assessment Tool:  RATE YOUR PLATE  *Given to patient to complete and return. Diet Assessment Tool:    Score: **/72        Diet Assessment Tool: RATE YOUR PLATE  Score: **/95   NUTRITION PLAN NUTRITION PLAN NUTRITION PLAN NUTRITION PLAN NUTRITION PLAN NUTRITION PLAN   *Interventions* *Interventions* *Interventions* *Interventions* *Interventions* *Interventions*   Initial Survey given Goal Setting Discussion:   [] Yes      [] No        Follow Up Survey Reviewed & Goals Updated:     Professional Referral  Please check if needed. [] Dietitian Consult   [] Wt. Management Referral  [] Other:  Professional Referral  Please check if needed. [] Dietitian Consult   [] Wt. Management Referral  [] Other: Professional Referral  Please check if needed. [] Dietitian Consult   [] Wt. Management Referral  [] Other: Professional Referral  Please check if needed. [] Dietitian Consult   [] Wt. Management Referral  [] Other: Professional Referral  Please check if needed. [] Dietitian Consult   [] Wt. Management Referral  [] Other: Professional Referral  Please check if needed. [] Dietitian Consult   [] Wt. Management Referral  [] Other:   *Education* *Education* *Education* *Education* *Education* *Education*   Nutritional Education Recommended    [x] 1:1 Registered Dietitian    Workshops  [x] Label Reading   [x] Menu  [x] Targeting Nutrition Priorities  [x] Mindful Eating   Nutritional Education Attended/Date Nutritional Education Attended/Date Nutritional Education Attended/Date Nutritional Education Attended/Date All Sessions Completed?     [] Yes  [] No   Cooking School  Recommended     [x] Adding Flavor  [x] Fast & Healthy     Breakfasts  [x] Salads & Dressings  [x] Savory Soups  [x] Simple Sides & Sauces  [x] Appetizers &     Snacks  [x] Delicious Desserts  [x] Plant-Based Proteins  [x] Fast Evening Meals  [x] Weekend Breakfasts  [x] Efficiency Cooking  [x] One-Pot [employer]  Sessions Completed   [address]  Sessions Completed [facility]  Sessions Completed     [address] [facility]  Sessions Completed Cooking School    # of sessions completed:  **   *Goals* *Goals* *Goals* *Goals* *Goals* *Goals*   Tereso's nutritional goals are as follows:  Complete and return diet survey Tereso's nutritional goals are as follows:  [] Attend Nutrition Workshops  [] Attend 1:1   [] Attend Cooking Classes  [] ** Tereso's nutritional goals are as follows:  [] Attend Nutrition Workshops  [] Attend 1:1   [] Attend Cooking Classes  [] Complete and return diet survey  [] ** Tereso's nutritional goals are as follows:  [] Attend Nutrition Workshops  [] Attend 1:1   [] Attend Cooking Classes  [] ** Tereso's nutritional goals are as follows:  [] Attend Nutrition Workshops  [] Attend 1:1   [] Attend Cooking Classes  [] ** Doris Littlejohn achieved nutritional goals   [] Yes    [] No  If no, why?   Use knowledge gained to continue Pritikin eating plan at home       Individual Cardiac Treatment Plan - Psychosocial  PSYCHOSOCIAL  ASSESSMENT/PLAN PSYCHOSOCIAL  REASSESSMENT PSYCHOSOCIAL   REASSESSMENT PSYCHOSOCIAL   REASSESSMENT PSYCHOSOCIAL  DISCHARGE/FOLLOW-UP PSYCHOSOCIAL  DISCHARGE/FOLLOW-UP   Stages of Change Stages of Change Stages of Change Stages of Change Stages of Change Stages of Change   [x] Pre Contemplation  [] Contemplation  [] Preparation  [] Action  [] Maintenance  [] Relapse [] Pre Contemplation  [] Contemplation  [] Preparation  [] Action  [] Maintenance  [] Relapse [] Pre Contemplation  [] Contemplation  [] Preparation  [] Action  [] Maintenance  [] Relapse [] Pre Contemplation  [] Contemplation  [] Preparation  [] Action  [] Maintenance  [] Relapse [] Pre Contemplation  [] Contemplation  [] Preparation  [] Action  [] Maintenance  [] Relapse [] Pre Contemplation  [] Contemplation  [] Preparation  [] Action  [] Maintenance  [] Relapse   PSYCHOSOCIAL ASSESSMENT PSYCHOSOCIAL ASSESSMENT PSYCHOSOCIAL ASSESSMENT PSYCHOSOCIAL ASSESSMENT PSYCHOSOCIAL ASSESSMENT PSYCHOSOCIAL ASSESSMENT   Behavioral Outcomes Behavioral Outcomes Behavioral Outcomes Behavioral Outcomes Behavioral Outcomes Behavioral Outcomes   PHQ-9 score 1  Depression Severity  [x]Minimal  []Mild   []Moderate  []Moderately Severe  []Severe    PHQ-9 score **  Depression Severity  []Minimal  []Mild   []Moderate  []Moderately Severe []Severe PHQ-9 score **  Depression Severity  []Minimal  []Mild   []Moderate  []Moderately Severe []Severe   Does patient have Family Support? [x] Yes      [] No  No signs of marital/family distress        Within the Past Month:  *Have you wished you were dead or wished you could go to sleep and not wake up? [] Yes      [x] No  *Have you had any thoughts of killing yourself? [] Yes      [x] No          Using a scale of 0-10, 0=none, 10=very:   Rate your depression: 0  Rate your anxiety:  0  Using a scale of 0-10, 0=none, 10=very:   Rate your depression: **  Rate your anxiety:  ** Using a scale of 0-10, 0=none, 10=very:   Rate your depression: **  Rate your anxiety:  ** Using a scale of 0-10, 0=none, 10=very:   Rate your depression: **  Rate your anxiety:  ** Using a scale of 0-10, 0=none, 10=very:   Rate your depression: **  Rate your anxiety:  ** Using a scale of 0-10, 0=none, 10=very:   Rate your depression: **  Rate your anxiety:  **   Signs and Symptoms of Depression Present? [x] Yes       No  Poor eye contact Signs and Symptoms of Depression Present? [] Yes      [] No  If yes, please explain:  ** Signs and Symptoms of Depression Present? [] Yes      [] No  If yes, please explain:  ** Signs and Symptoms of Depression Present?     [] Yes      [] No  If yes, please explain: ** Signs and Symptoms of Depression Present? [] Yes      [] No  If yes, please explain:  ** Signs and Symptoms of Depression Present? [] Yes      [] No  If yes, please explain:  **   Signs and Symptoms of Anxiety Present? [] Yes      [x] No   Signs and Symptoms of Anxiety Present? [] Yes      [] No  If yes, please explain:  ** Signs and Symptoms of Anxiety Present? [] Yes      [] No  If yes, please explain:  ** Signs and Symptoms of Anxiety Present? [] Yes      [] No  If yes, please explain:  ** Signs and Symptoms of Anxiety Present? [] Yes      [] No  If yes, please explain:  ** Signs and Symptoms of Anxiety Present? [] Yes      [] No  If yes, please explain:  **   [x] Patient has poor eye contact   [] Flat affect present. [] Signs of anxiety, anger or hostility    [] Signs social isolation present. []  Signs of alcohol or substance abuse        PSYCHOSOCIAL PLAN PSYCHOSOCIAL PLAN PSYCHOSOCIAL PLAN PSYCHOSOCIAL PLAN PSYCHOSOCIAL PLAN PSYCHOSOCIAL PLAN   *Interventions* *Interventions* *Interventions* *Interventions* *Interventions* *Interventions*   *Please check if needed  [] Psych Consult  [] Physician Referral  [x] Stress Management Skills *Please check if needed  [] Psych Consult  [] Physician Referral  [] Stress Management Skills *Please check if needed  [] Psych Consult  [] Physician Referral  [] Stress Management Skills *Please check if needed  [] Psych Consult  [] Physician Referral  [] Stress Management Skills *Please check if needed  [] Psych Consult  [] Physician Referral  [] Stress Management Skills *Please check if needed  [] Psych Consult  [] Physician Referral  [] Stress Management Skills   Is patient currently taking anti-depressant or anti-anxiety medications? [] Yes      [x] No   Change in anti-depressant or anti-anxiety medications? [] Yes      [] No  If yes, please list medications:  ** Change in anti-depressant or anti-anxiety medications?   [] Yes      [] No  If yes, please list medications:  ** Change in anti-depressant or anti-anxiety medications? [] Yes      [] No  If yes, please list medications:  ** Change in anti-depressant or anti-anxiety medications? [] Yes      [] No  If yes, please list medications:  ** Change in anti-depressant or anti-anxiety medications? [] Yes      [] No  If yes, please list medications:  **   *Education* *Education* *Education* *Education* *Education* *Education*   Healthy Mind-Set Workshops Recommended  [x] Stress & Health  [x] Taking Charge of Stress  [x] New Thoughts, New Behaviors  [x] Managing Moods & Relationships Healthy Mind-Set Workshops Attended/Date Healthy Mind-Set Workshops Attended/Date Healthy Mind-Set Workshops Attended/Date Healthy Mind-Set Workshops  Completed  [] Yes      [] No Healthy Mind-Set Workshops  Completed  [] Yes      [] No   *Goals* *Goals* *Goals* *Goals* *Goals* *Goals*   Tereso's psychosocial goals are as follows:  Complete HADS & [employer] [place], Quality of Life Index, Cardiac Version IV Tereso's psychosocial goals are as follows:  [] Attend Healthy Mind-Set Workshops  [] Reduce depression symptom severity by 1 level  [] ** Tereso's psychosocial goals are as follows:  [] Attend Healthy Mind-Set Workshops  [] Reduce depression symptom severity by 1 level  [] ** Tereso's psychosocial goals are as follows:  [] Attend Healthy Mind-Set Workshops  [] Reduce depression symptom severity by 1 level  [] Vic Celestin achieved psychosocial goals? [] Yes    [] No  If no, why?  **  [] Use methods learned to continue to reduce depression symptom severity by 1 level  [] Vic Celestin achieved psychosocial goals?   [] Yes    [] No  If no, why?  **  [] Use methods learned to continue to reduce depression symptom severity by 1 level  [] **     Individual Cardiac Treatment Plan - Other:  Risk Factor/Education  RISK FACTOR  ASSESSMENT/PLAN RISK FACTOR  REASSESSMENT  RISK FACTOR  REASSESSMENT RISK FACTOR  REASSESSMENT RISK FACTOR   DISCHARGE/FOLLOW-UP RISK FACTOR   DISCHARGE/FOLLOW-UP   Stages of Change Stages of Change Stages of Change Stages of Change Stages of Change Stages of Change   [x] Pre Contemplation  [] Contemplation  [] Preparation  [] Action  [] Maintenance  [] Relapse [] Pre Contemplation  [] Contemplation  [] Preparation  [] Action  [] Maintenance  [] Relapse [] Pre Contemplation  [] Contemplation  [] Preparation  [] Action  [] Maintenance  [] Relapse [] Pre Contemplation  [] Contemplation  [] Preparation  [] Action  [] Maintenance  [] Relapse [] Pre Contemplation  [] Contemplation  [] Preparation  [] Action  [] Maintenance  [] Relapse [] Pre Contemplation  [] Contemplation  [] Preparation  [] Action  [] Maintenance  [] Relapse   RISK FACTOR/EDUCATION ASSESSMENT RISK FACTOR/EDUCATION ASSESSMENT RISK FACTOR/EDUCATION ASSESSMENT RISK FACTOR/EDUCATION ASSESSMENT RISK FACTOR /EDUCATION ASSESSMENT RISK FACTOR /EDUCATION ASSESSMENT   Hypertension  [x] Yes      [] No    Resting BP: 86/42  Peak Ex BP:132/62  Medication: metoprolol, amlodipine, entresto   Hypertension  Resting BP: **  Peak Ex BP:**  Medication Changes:  [] Yes      [] No Hypertension  Resting BP: **  Peak Ex BP:**  Medication Changes:  [] Yes      [] No Hypertension  Resting BP: **  Peak Ex BP:**  Medication Changes:  [] Yes      [] No Hypertension  Resting BP: **  Peak Ex BP:**  Medication Changes:  [] Yes      [] No Hypertension  Resting BP: **  Peak Ex BP:**  Medication Changes:  [] Yes      [] No   Lipids  HLD/DLD  [x] Yes      [] No  TOTAL CHOL: 236  HDL:  46  LDL:  163  TRI  Medication: atorvastatin Lipids  Medication Changes:  [] Yes      [] No     Lipids  Medication Changes:  [] Yes      [] No     Lipids  Medication Changes:  [] Yes      [] No     Lipids    TOTAL CHOL: **  HDL:  **  LDL:  **  TRIG:  **  Medication Changes:  [] Yes      [] No Lipids    TOTAL CHOL: **  HDL:  **  LDL:  **  TRIG:  **  Medication Changes:  [] Yes      [] No Diabetes  [x] Yes      [] No  FBS: 201           HbA1C: 8.7        Monitor BS @ home:   [x] Yes      [] No  Frequency: 2 x per week  Medication: empagliflozin- metformin, linagliptin Diabetes  Most Recent BS:  BS have been in range  [] Yes      [] No  Medication Changes  [] Yes      [] No   Diabetes  Most Recent BS:  BS have been in range  [] Yes      [] No  Medication Changes  [] Yes      [] No     Diabetes  Most Recent BS:  BS have been in range  [] Yes      [] No  Medication Changes  [] Yes      [] No     Diabetes  Most Recent BS:  BS have been in range  [] Yes      [] No  Medication Changes  [] Yes      [] No Diabetes  Most Recent BS:  BS have been in range  [] Yes      [] No  Medication Changes  [] Yes      [] No       Tobacco Use  [x] Current  [] Former  [] Never    Years smoked: 40+:      Quit date set:  [x] Yes      [] No  Date: 1/1/23  # cigarettes smoked/day: 8  Smokeless Tobacco use:   [] Yes      [x] No   Tobacco Use  Change in smoking status   [] Yes      [] No    Quit date: **   Tobacco Use  Change in smoking status   [] Yes      [] No    Quit date: **   Tobacco Use  Change in smoking status   [] Yes      [] No    Quit date: ** Tobacco Use  Change in smoking status   [] Yes      [] No    Quit date: ** Tobacco Use  Change in smoking status   [] Yes      [] No    Quit date: **             Learning Barriers  Please select one:  [] Speech  [] Literacy  [] Hearing  [] Cognitive  [] Vision  [x] Ready to Learn Learning Barriers Addressed:   [] Yes      [] No   Learning Barriers Addressed:   [] Yes      [] No   Learning Barriers Addressed:  [] Yes      [] No Learning Barriers Addressed:  [] Yes      [] No Learning Barriers Addressed:  [] Yes      [] No     RISK FACTOR/EDUCATION PLAN RISK FACTOR/EDUCATION PLAN RISK FACTOR/EDUCATION PLAN RISK FACTOR/EDUCATION PLAN RISK FACTOR/EDUCATION PLAN RISK FACTOR/EDUCATION PLAN   *Interventions* *Interventions* *Interventions* *Interventions* *Interventions* *Interventions*   Recommended Educational Videos    [x] Overview of The Pritikin Eating Plan  [x] Heart Disease Risk Reduction  [x] Move It! [x] Calorie Density  [x] Healthy Minds, Bodies, Hearts  [x] Label Reading  [x] Metabolic Syndrome & Belly   Or  [] How Our Thoughts Can Heal our Heart  [x] Dining Out-Part 1  [x] Biomechanical Limitations  [x] Facts on Fat  [x] Hypertension & Heart Disease  [x] Diseases of Our Times-Focusing on Diabetes  [x] Body Composition  [x] Nurtition Action Plan  [x] Exercise Action Plan   Completed Videos/Date      12/1/22  Overview of The Pritikin Eating Plan Completed Videos/Date Completed Videos/Date Completed Videos/Date Recommended Educational Videos Completed    [] Yes      [] No    **If not completed, Why? **           Smoking Cessation/Relaspe Prevention Intervention needed? [x] Yes      [] No  *Pt verbalizes and agrees to attend intervention Smoking Cessation/Relapse Prevention Scheduled? [] Yes      [] No  Date:  ** Smoking Cessation/Relapse Prevention completed? [] Yes      [] No  Date: ** Smoking Cessation/Relapse Prevention completed? [] Yes      [] No  Date: ** Smoking Cessation/Relapse Prevention completed? [] Yes      [] No  Date: ** Smoking Cessation Counseling attended  [] Yes      [] No  **If not completed, Why? **   Professional Referrals:  *Please check if needed  [] Diabetes Clinic  [] Lipid Clinic   [] Other:      Professional Referrals:  *Please check if needed  [] Diabetes Clinic  [] Lipid Clinic   [] Other:   Preventative Medication Preventative Medication Preventative Medication Preventative Medication Preventative Medication Preventative Medication   Aspirin  [x] Yes    [] No  Blood Thinner: Clopidogrel/Effient/Brillinta  [x] Yes    [] No  Beta Blocker  [x] Yes    [] No  Ace Inhibitor  [] Yes    [x] No  Statin/Lipid Lowering  [x] Yes    [] No Medication Changes? [] Yes    [] No Medication Changes? [] Yes    [] No Medication Changes?   [] Yes    [] No Medication Changes? [] Yes    [] No Medication Changes? [] Yes    [] No   *Education* *Education* *Education* *Education* *Education* *Education*   Does Andrei Hamilton require any additional education? [] Yes    [] No   Does Andrei Hamilton require any additional education? [] Yes    [] No Does Andrei Hamilton require any additional education? [] Yes    [] No Does Andrei Hamilton require any additional education? [] Yes    [] No Does Andrei Hamilton require any additional education? [] Yes    [] No Does Andrei Hamilton require any additional education?   [] Yes    [] No   Additional Educational Videos    Exercise  [] Improve Performance    Medical  [] Aging Enhancing Your QoL  [] Biology of Weight Control  [] Decoding Lab Results  [] Diseases of Our Time - Overview  [] Sleep Disorders    Nutrition  [] Dining Out - Part 2  [] Fueling a Healthy Body  [] Menu Workshop  [] Planning Your Eating Strategy  [] Targeting Your Nutrition Priorities  [] Vitamins & Minerals    Healthy Mind-Set  [x] Smoking Cessation    Culinary  []Becoming a Pritikin    [] Cooking - Breakfast & Snacks  [] Cooking -Healhty Salads & Dressing  [] Cooking -Dinner & Sides  [] Cooking -Soups & Desserts    Overview  [] The Pritikin Solution Additional Educational Videos Completed Additional Educational Videos Completed Additional Educational Videos Completed Additional Educational Videos Completed Additional Educational Videos Completed    [] Yes    [] No   *Goals* *Goals* *Goals* *Goals* *Goals* *Goals*   Tereso's risk factor/education goals are as follows:    [x] Optimal BP <140/90  [] Blood Sugar <120  [x] Attend recommended video education sessions  [x] Takes medications as prescribed 100% of the time   [] ** Tereso's risk factor/education goals are as follows:    [x] Optimal BP <140/90  [] Blood Sugar <120  [x] Attend recommended video education sessions  [x] Takes medications as prescribed 100% of the time   [] ** Tereso's risk factor/education goals are as follows:    [x] Optimal BP <140/90  [] Blood Sugar <120  [x] Attend recommended video education sessions  [x] Takes medications as prescribed 100% of the time   [] ** Tereso's risk factor/education goals are as follows:    [x] Optimal BP <140/90  [] Blood Sugar <120  [x] Attend recommended video education sessions  [x] Takes medications as prescribed 100% of the time   [] ** Tereso's risk factor/education goals are as follows:    [x] Optimal BP <140/90  [] Blood Sugar <120  [x] Attend recommended video education sessions  [x] Takes medications as prescribed 100% of the time   [] Ktaie Ramirez achieved risk factor goals?   [] Yes    [] No  If no, why?  **     Monitored telemetry has revealed NSR with occ PVC    [x] associated symptoms SOB Monitored telemetry has revealed **  [] documented arrhythmia at increasing workloads  [] associated symptoms ** Monitored telemetry has revealed  [] documented arrhythmia at increasing workloads  [] associated symptoms ** Monitored telemetry has revealed **  [] documented arrhythmia at increasing workloads  [] associated symptoms ** Monitored telemetry has revealed **  [] documented arrhythmia at increasing workloads  [] associated symptoms ** Monitored telemetry has revealed **  [] documented arrhythmia at increasing workloads  [] associated symptoms **   Physician Response    [x] Cardiac rehab is reasonably and medically necessary for continuous cardiac monitoring surveillance  of patient's cardiac activity  [x] Initiate continuous telemetry monitoring and notify me with any concerns  [] Other   Physician Response    [x] Cardiac rehab is reasonably and medically necessary for continuous cardiac monitoring surveillance  of patient's cardiac activity  [x] Continue continuous telemetry monitoring and notify me with any concerns  [] Other     Physician Response    [x] Cardiac rehab is reasonably and medically necessary for continuous cardiac monitoring surveillance  of patient's cardiac activity  [x] Continue continuous telemetry monitoring and notify me with any concerns   [] Other     Physician Response    [x] Cardiac rehab is reasonably and medically necessary for continuous cardiac monitoring surveillance  of patient's cardiac activity  [x] Continue continuous telemetry monitoring and notify me with any concerns   [] Other     Physician Response    [x] Cardiac rehab is reasonably and medically necessary for continuous cardiac monitoring surveillance  of patient's cardiac activity  [x] Continue continuous telemetry monitoring and notify me with any concerns   [] Other

## 2022-12-08 ENCOUNTER — HOSPITAL ENCOUNTER (OUTPATIENT)
Dept: CARDIAC REHAB | Age: 62
Setting detail: THERAPIES SERIES
Discharge: HOME | End: 2022-12-08
Payer: MEDICAID

## 2022-12-08 PROCEDURE — G0423 INTENS CARDIAC REHAB NO EXER: HCPCS

## 2022-12-08 PROCEDURE — G0422 INTENS CARDIAC REHAB W/EXERC: HCPCS

## 2022-12-08 NOTE — PROGRESS NOTES
Video Education Report - ICR/CR  Name:  Shaniqua Retana     Date:  12/8/2022  MRN: [id number]     Session #:  2  Session Length: 40 min    Core Videos        [x]Heart Disease Risk Reduction       []Overview of Pritikin Eating Plan      []Move it          []Calorie Density         []Healthy Minds, Bodies, Hearts        []Label Reading - Part 1       []Metabolic Syndrome and Belly Fat        []How Our Thoughts Can Heal Our Hearts   []Dining Out - Part 1      []Biomechanical Limitations  []Facts on Fat        []Hypertension & Heart Disease    []Diseases of Our Time - Focusing on Diabetes   []Body Composition  []Nutrition Action Plan    []Exercise Action Plan      Comments:  Video completed, group discussion

## 2022-12-13 ENCOUNTER — HOSPITAL ENCOUNTER (OUTPATIENT)
Dept: CARDIAC REHAB | Age: 62
Setting detail: THERAPIES SERIES
Discharge: HOME | End: 2022-12-13
Payer: MEDICAID

## 2022-12-13 PROCEDURE — G0423 INTENS CARDIAC REHAB NO EXER: HCPCS

## 2022-12-13 PROCEDURE — G0422 INTENS CARDIAC REHAB W/EXERC: HCPCS

## 2022-12-13 NOTE — PROGRESS NOTES
Blanca PADRON.:  1960    Acct Number: [de-identified]   MRN:  [id number]                             [facility] HEALTHY MIND-SET WORKSHOP             Date: 2022        Session #________    Anju Agent class covered:    []  New Thoughts New Behaviors Workshop:  Patient will learn and practice techniques for developing effective health and lifestyle goals. Patient will be able to effectively apply the goal setting process learned to develop at least one new personal goal.     []  Managing Moods & Relationships Workshop:  Patient will learn how emotional and chronic stress factors can impact their hearts. They will learn healthy ways to handle stress and utilize positive coping mechanisms. In addition, [facility] patient will learn ways to improve communication skills. [x]  Healthy Sleep for a healthy Heart:  Patients will learn the importance of sleep to overall health, well-being, and quality of life. They will understand the symptoms of, and treatments for, common sleep disorders. Patients will also be able to identify daytime and nighttime behaviors which impact sleep, and they will be able to apply these tools to help manage sleep-related challenges. []  Recognizing and Reducing Stress:  Patients will learn about stress and how to recognize stress. Patients will gain insight into the toll that chronic stress takes on their health, both emotionally and physically. Patients will learn and practice a variety of stress management techniques. Patients will be able to effectively apply coping mechanisms in perceived stressful situations. Evan Mosher actively participated and verbalized understanding. Total time in the Healthy Mind-Set class was 60 minutes.     Electronically signed by BUSTER Salazar on 2022 at 12:23 PM

## 2022-12-15 ENCOUNTER — HOSPITAL ENCOUNTER (OUTPATIENT)
Dept: CARDIAC REHAB | Age: 62
Setting detail: THERAPIES SERIES
Discharge: HOME | End: 2022-12-15
Payer: MEDICAID

## 2022-12-15 ENCOUNTER — HOSPITAL ENCOUNTER (OUTPATIENT)
Dept: CARDIAC REHAB | Age: 62
Setting detail: THERAPIES SERIES
End: 2022-12-15
Payer: MEDICAID

## 2022-12-15 ENCOUNTER — APPOINTMENT (OUTPATIENT)
Dept: CARDIAC REHAB | Age: 62
End: 2022-12-15
Payer: MEDICAID

## 2022-12-15 PROCEDURE — G0422 INTENS CARDIAC REHAB W/EXERC: HCPCS

## 2022-12-15 PROCEDURE — G0423 INTENS CARDIAC REHAB NO EXER: HCPCS

## 2022-12-20 ENCOUNTER — HOSPITAL ENCOUNTER (OUTPATIENT)
Dept: CARDIAC REHAB | Age: 62
Setting detail: THERAPIES SERIES
Discharge: HOME | End: 2022-12-20
Payer: MEDICAID

## 2022-12-20 PROCEDURE — G0423 INTENS CARDIAC REHAB NO EXER: HCPCS

## 2022-12-20 PROCEDURE — G0422 INTENS CARDIAC REHAB W/EXERC: HCPCS

## 2022-12-20 NOTE — PROGRESS NOTES
Remberto PADRON.:  1960    Acct Number: [de-identified]   MRN:  [id number]                             [facility] NUTRITION 1:1 Counseling             Date: 2022       Session # _______   1:1 Counseling Session    GOALS:  \"It don't bother me\"  \"I'm 58years old. You can't change me\"  2. Does state he wants his heart to be \"stronger\" and states his blood sugars are usually in the 200-250 range, indicating a potential interest in better blood sugar management. Rodolfo Mars reports having just started the Pritikin program and has viewed a couple videos. He states he likes baloney, and hot dogs. He has tried the turkey version but did not like them. He does not add fat or salt to his food except watermelon. He likes cheese in his omelettes and sandwiches. He states he chooses whole wheat bread. He does like carrots and radishes (w/ peanut butter) and other fruits and veggies. He plans to have apple pie over the holiday. Summary:  We explored his current habits of eating and reviewed his goals. Educated him regarding how following the Pritikin program can help him meet his goals. We reviewed his usual intake and discussed specific ways he can utilize healthier substitutes and add more heart healthy fiber to his diet. He appeared resistant at first, but graciously listened and took home some recipes that he might try. He did say that he will probably try to add more fish to his diet and try lentils. He and his wife were invited to the cooking school.     Reported Weight Trends:   Edema:   Lab Trends:   Rate Your Plate:     Receptiveness to education/goals:  (X) Agreeable ( ) No Interest   ( ) Refused  Evaluation of education:  (X) Indicates understanding   ( ) Needs reinforcement     () Unsuccessful     Readiness to change:    ( ) Pre-contemplative   ( ) Contemplative - ambivalent about change    (X) Action - ready to set action plan and implement   ( ) Maintenance - has made change and is trying, and or practicing different alternative behaviors     Exercise Habits:  Maine Oshea reports on days off he/she (n/a)    Food Recall:  Breakfast:  (9-10am) 3-4 egg omelette, sausage, cottage cheese, toast, coffee, 2% milk (8-12 ounces)  Lunch:  Skips  Dinner:  4:30-6pm Spaghetti w/ meat sauce, 12 oz milk  Snacks:  potato chips/ sherbert/ <1/4 c. Walnuts/pecans/cashews (salted)  Main Beverages:  Milk 2-3 8-12 ounce glasses per day; Coffee 2-3 pots/day (10 cups)    Grocery Shopping:  together w/ wife  Meal Prep/Cooking: wife does preparation  Dining Out:    Maine Oshea was counseled by the Dietitian for 45 minutes. Motivational Interviewing was used to help promote change. Patient voiced understanding.      Electronically signed by Phillip Up RD on 12/20/2022 at 12:18 PM

## 2022-12-22 ENCOUNTER — HOSPITAL ENCOUNTER (OUTPATIENT)
Dept: CARDIAC REHAB | Age: 62
Setting detail: THERAPIES SERIES
End: 2022-12-22
Payer: MEDICAID

## 2022-12-22 ENCOUNTER — APPOINTMENT (OUTPATIENT)
Dept: CARDIAC REHAB | Age: 62
End: 2022-12-22
Payer: MEDICAID

## 2022-12-27 ENCOUNTER — HOSPITAL ENCOUNTER (OUTPATIENT)
Dept: CARDIAC REHAB | Age: 62
Setting detail: THERAPIES SERIES
End: 2022-12-27
Payer: MEDICAID

## 2022-12-27 ENCOUNTER — APPOINTMENT (OUTPATIENT)
Dept: CARDIAC REHAB | Age: 62
End: 2022-12-27
Payer: MEDICAID

## 2022-12-28 ENCOUNTER — TELEPHONE (OUTPATIENT)
Dept: CARDIOLOGY CLINIC | Age: 62
End: 2022-12-28

## 2022-12-28 DIAGNOSIS — I51.9 LV DYSFUNCTION: Primary | ICD-10-CM

## 2022-12-28 NOTE — TELEPHONE ENCOUNTER
Dr. Basilia Beltran would like patient to have a limited 2D echo prior to patient's appointment in January.

## 2022-12-29 ENCOUNTER — HOSPITAL ENCOUNTER (OUTPATIENT)
Dept: CARDIAC REHAB | Age: 62
Setting detail: THERAPIES SERIES
Discharge: HOME | End: 2022-12-29
Payer: MEDICAID

## 2022-12-29 PROCEDURE — G0422 INTENS CARDIAC REHAB W/EXERC: HCPCS

## 2022-12-29 PROCEDURE — G0423 INTENS CARDIAC REHAB NO EXER: HCPCS

## 2022-12-29 NOTE — PROGRESS NOTES
Video Education Report - ICR/CR  Name:  Brianne Griffin     Date:  12/29/2022  MRN: [id number]     Session #:  5  Session Length: 40 min    Core Videos        []Heart Disease Risk Reduction       []Overview of Pritikin Eating Plan      []Move it          [x]Calorie Density         []Healthy Minds, Bodies, Hearts        []Label Reading - Part 1       []Metabolic Syndrome and Belly Fat        []How Our Thoughts Can Heal Our Hearts   []Dining Out - Part 1      []Biomechanical Limitations  []Facts on Fat        []Hypertension & Heart Disease    []Diseases of Our Time - Focusing on Diabetes   []Body Composition  []Nutrition Action Plan    []Exercise Action Plan    Comments:  Video completed, group discussion

## 2022-12-29 NOTE — PLAN OF CARE
[address] [place] [address] Facility-Based Program  Individualized Cardiac Treatment Plan    Patient Name:  Jamee Kwan  :  1960  Age:  58 y.o. MRN:  [id number]  Diagnosis: PCI  Date of Event: 10/20/22   Physician:  Donis Glass  Next Office Visit:    Date Entered Program: 22  Risk Stratifications: [] Low [] Intermediate [x] High  Allergies: Not on File    COVID -19 Screen  Do you have any of the following symptoms:  [] Fever [] Cough [] SOB [] Muscle/Body Ache [] Loss of taste/smell [x] None  []  Have you traveled outside of the [place]? [] Yes      x No    Have you been around anyone who has tested Positive for COVID 19?  [] Yes      [x] No    The following Education has been completed with patient. xWait in the lobby until we call you back to rehab. [x][] Bring your own bottle of water with you.       Individual Cardiac Treatment Plan -EXERCISE  INITIAL 30 DAY 60 DAY 90  DAY FINAL DAY   EXERCISE  ASSESSMENT/PLAN EXERCISE  REASSESSMENT EXERCISE   REASSESSMENT EXERCISE   REASSESSMENT EXERCISE   REASSESSMENT EXERCISE  DISCHARGE/FOLLOW-UP   Date: 22 Date: 22 Date: Date: Date: Date:   Session #1  First Exercise Session:  ** Session # 12 Session # ** Session # ** Session # ** Session # **  Last Exercise Session:  **   Stages of Change Stages of Change Stages of Change Stages of Change Stages of Change Stages of Change   [x] Pre Contemplation  [] Contemplation  [] Preparation  [] Action  [] Maintenance  [] Relapse [] Pre Contemplation  [] Contemplation  [] Preparation  [x] Action  [] Maintenance  [] Relapse [] Pre Contemplation  [] Contemplation  [] Preparation  [] Action  [] Maintenance  [] Relapse [] Pre Contemplation  [] Contemplation  [] Preparation  [] Action  [] Maintenance  [] Relapse [] Pre Contemplation  [] Contemplation  [] Preparation  [] Action  [] Maintenance  [] Relapse [] Pre Contemplation  [] Contemplation  [] Preparation  [] Action  [] Maintenance  [] Relapse   EXERCISE ASSESSMENT EXERCISE ASSESSMENT EXERCISE ASSESSMENT EXERCISE ASSESSMENT EXERCISE ASSESSMENT EXERCISE ASSESSMENT   6 Min Walk Test  Distance walked:   0.14 miles  739 ft.  2.1 METs  Max HR:108 BPM      RPE:  12    THR:  116-130  Rhythm:  NSR with occ PVC     6 Min Walk Test  Distance walked:   ** miles  ** ft  ** METs  Max HR:** BPM      RPE:  **  %Change ft= **    Rhythm:  **   DASI: 4.7 METs DASI: 5.1  METs DASI: ** METs DASI: ** METs DASI: ** METs DASI: ** METs   Return to Work  [employer] on returning to work? [] Yes              [] No   [x] Disabled     [x] Retired        Return to work: Has Yolis Monday returned to work? [] Yes    [] No    Return to work date set? [] Yes, **    [] No    Yolis Monday is doing ** at work. Return to work: Has Yolis Monday returned to work? [] Yes    [] No    Return to work date set? [] Yes, **    [] No    Yolis Monday is doing ** at work. Return to work: Has Yolis Monday returned to work? [] Yes    [] No    Return to work date set? [] Yes, **    [] No    Yolis Monday is doing ** at work. Return to work: Has Yolis Monday returned to work? [] Yes    [] No    Return to work? [] Yes, **    [] No    *Required MET Level achieved for job duties?    [] Yes    [] No   Orthopedic Limitations/  [x] Yes    [] No  If yes please list:  foot pain     Orthopedic Limitations  *If patient has orthopedic issue:   Actions/  accomodations needed to make Lanae Monday successful : no TM Orthopedic Limitations   Orthopedic Limitations   Orthopedic Limitations Orthopedic Limitations     Fall Risk    [x]Assessed as a fall risk  [] Not a fall risk      [x] Balance Issues       [] Tamar Montoya        [] Cane       [] Wheelchair  Actions needed:  encouraged to use a cane if unbalanced    [x] Safety issues reviewed      Fall Risk  *If patient is a fall risk, action needed to accommodate: Pt does not use the TM Topix Exercise  [] Yes    [x] No   Home Exercise  [x] Yes    [] No  Type: walking  Frequency:4 x per week  Duration: 5 min Home Exercise  [] Yes    [] No  Type: **  Frequency: **  Duration: ** Home Exercise  [] Yes    [] No  Type: **  Frequency: **  Duration: ** Home Exercise  [] Yes    [] No  Type: **  Frequency: **  Duration: ** Home Exercise  [] Yes    [] No  Type: **  Frequency: **  Duration: **   Angina with Activity? [x] Yes    [] No  Angina Management: rest, nitro Angina with Activity? [] Yes    [x] No  Angina Management: na Angina with Activity? [] Yes    [] No  Angina Management: ** Angina with Activity? [] Yes    [] No  Angina Management: ** Angina with Activity? [] Yes    [] No  Angina Management: ** Angina with Activity?   [] Yes    [] No  Angina Management: **   EXERCISE PLAN EXERCISE PLAN EXERCISE PLAN EXERCISE PLAN EXERCISE PLAN EXERCISE PLAN   *Interventions* *Interventions* *Interventions* *Interventions* *Interventions* *Interventions*   Exercise Prescription  (per physician & CR staff) Exercise Prescription  (per physician & CR staff) Exercise Prescription  (per physician & CR staff) Exercise Prescription  (per physician & CR staff) Exercise Prescription  (per physician & CR staff) Exercise Prescription  (per physician & CR staff)   Cardiovascular Cardiovascular Cardiovascular Cardiovascular Cardiovascular Cardiovascular   Mode:    [x] Treadmill (TM)  [x] Schwinn Airdyne (AD)  [x] Arms Ergometer (AE)  [] NuStep  [] Elliptical (E) MODE:    [] Treadmill (TM)  [x] Schwinn Airdyne (AD)  [x] Arms Ergometer (AE)  [x] NuStep  [] Elliptical (E) MODE:    [] Treadmill (TM)  [] Schwinn Airdyne (AD)  [] Arms Ergometer (AE)  [] NuStep  [] Elliptical (E) MODE:    [] Treadmill (TM)  [] Schwinn Airdyne (AD)  [] Arms Ergometer (AE)  [] NuStep  [] Elliptical (E) MODE:    [] Treadmill (TM)  [] Schwinn Airdyne (AD)  [] Arms Ergometer (AE)  [] NuStep  [] Elliptical (E) MODE:    [] Treadmill (TM)  [] Schwinn Airdyne (AD)  [] Arms Ergometer (AE)  [] NuStep  [] Elliptical (E)   Initial Workloads  TM: Sebastian[employer] 2.1 METs  AD: 0.7 level = 2.1 METs  NS: 36  Golden= 2.1 METs  AE: 0.4 level = 1.6 METs Current Workloads    AD:  27 Golden =  2.3METs  NS:   42Watts= 2.2 METs  AE:  24 Golden =  METs Current Workloads  TM:  @ %=  METs  AD:  level =  METs  NS:   Golden=  METs  AE:  level =  METs Current Workloads  TM:  @ %=  METs  AD:  level =  METs  NS:   Golden=  METs  AE:  level =  METs Current Workloads  TM:  @ %=  METs  AD:  level =  METs  NS:   Golden=  METs  AE:  level =  METs Current Workloads  TM:  @ %=  METs  AD:  level =  METs  NS:   Golden=  METs  AE:  level =  METs     Frequency:    ICR: 3x/week  Home: 2-3x/wk Frequency:   ICR: 3x/week  Home: 3x/wk Frequency:  ICR: 3x/week  Home: 3-4x/wk Frequency:  ICR: 3x/week  Home: 3-4x/wk Frequency:  ICR: 3x/week  Home: 3-4x/wk Frequency:  David Rucker will continue exercise at  5-7 days/week   Duration:   Total aerobic exercise = 30-45 min    5-8 min/mode Duration:  Total aerobic exercises = 23 min   8-10min/mode Duration:  Total aerobic exercises = ** min     **min/mode Duration:  Total aerobic exercises = ** min     **min/mode Duration:  Total aerobic exercises = ** min     **min/mode Duration:  Total erobic exercise =  60-90 min   Intensity:   MET Level = 2.1  RPE = 12-15 Intensity:  Max MET Level = 2.3  RPE = 12-15 Intensity:  Max MET Level = **  RPE = 12-15 Intensity:  Max MET Level = **  RPE = 12-15 Intensity:  Max MET Level = **  RPE = 12-15 Intensity:  Max MET Level = ** RPE = 12-15   Progression: increase aerobic activity up to 15 min over next 4 weeks by increasing time 2-3 min/week. Progression:  Increase duration to 15 min per mode over the next 4 weeks as tolerated.  Progression:   Progression: Progression: Progression:  Increase time/intensity when RPE <13, and HR is in [facility]   [x] Yes      [] No  Upper and Lower body strength training 2x/wk    Wt: 2#       Reps:  8-15    *Increase wt. after completing 15 reps with an RPE of <12/13. [] Yes      [x] No  Upper and Lower body strength training 2x/wk    Wt: **#       Reps:  8-15    *Increase wt. after completing 15 reps with an RPE of <12/13. [] Yes      [] No  Upper and Lower body strength training 2x/wk    Wt: **#       Reps:  8-15    *Increase wt. after completing 15 reps with an RPE of <12/13. [] Yes      [] No  Upper and Lower body strength training 2x/wk    Wt: **#       Reps:  8-15    *Increase wt. after completing 15 reps with an RPE of <12/13. [] Yes      [] No  Upper and Lower body strength training 2x/wk    Wt: **#       Reps:  8-15    *Increase wt. after completing 15 reps with an RPE of <12/13. Continue Strength Training at home   [] Exercise Log & Strength training handout given     Wt: **#       Reps:  8-15    *Increase wt. after completing 15 reps with an RPE of <12/13. Flexibility Flexibility Flexibility Flexibility Flexibility Flexibility   [x] Yes      [] No  25 min session of Core Strength & Flexibility 1x/per week  Attends Core Strength & Flexibility   [] Yes      [x] No Attends Core Strength & Flexibility   [] Yes      [] No Attends Core Strength & Flexibility   [] Yes      [] No Attends Core Strength & Flexibility   [] Yes      [] No Continue Core Strength & Flexibility at home   Home Exercise  *Rhonda Anaya planning to walk 3-4 days/week for 5-10 min on days not in rehab. Home Exercise  *Annette Nielsen verbalizes planning to walk 3-4 days/week for 10-20 min on days not in rehab. Home Exercise  *Annette Nielsen verbalizes planning to ** ** days/week for ** min on days not in rehab. Home Exercise  *Annette Nielsen verbalizes planning to ** ** days/week for ** min on days not in rehab. Home Exercise  *Annette Nielesn verbalizes planning to ** ** days/week for ** min on days not in rehab.  Home Exercise  *Annette Nielsen verbalizes his/her plan to ** ** days/week for ** min @ ** *Education* *Education* *Education* *Education* *Education* *Education*   RPE Scale  [x] Yes      [] No  Exercise Safety  [x] Yes      [] No  Equipment Orientation  [x] Yes      [] No  S/S to Report  [x] Yes      [] No  Warm Up/Cool Down  [x] Yes      [] No  Home Exercise  [x] Yes      [] No     All Exercise Education Completed  [] Yes      [] No   Exercise Education Recommended    Workshops  [x] Benefits of Exercise  [x] Balance Training & Fall Prevention  [x] Heart Disease Risk Reduction  [x] Yoga & Heart Health Exercise Education Attended/Date     Exercise Education Attended/Date Exercise Education Attended/Date Exercise Education Attended/Date All Sessions Completed? [] Yes  [] No   *Goals* *Goals* *Goals* *Goals* *Goals* *Goals*   Initial Exercise Goals Exercise Goals  Exercise Goals   Exercise Goals  Exercise Goals  Exercise Goals   Judithe July plans to:  [x] Attend exercise sessions 3x/wk  [x] initiate home exercise 2-3x/wk for 10-20 min  [x] Increase 6 min walk distance by 10%  [x] Attend Exercise workshops Judithe July plans to:  [x] Attend exercise sessions 3x/wk  [x] continue home exercise 2-3x/wk for 20-30 min  [x] Attend Exercise workshops Tereso's plans to:  [x] Attend exercise sessions 3x/wk  [x] continue home exercise 3-4x/wk for 30-45 min  [x] Determine plan of exercise following rehab  [x] Attend Exercise workshops Tereso's plans to:  [x] Attend exercise sessions 3x/wk  [x] continue home exercise 3-4x/wk for 30-45 min  [x] Determine plan of exercise following rehab  [x] Attend Exercise workshops Tereso's plans to:  [x] Attend exercise sessions 3x/wk  [x] continue home exercise 3-4x/wk for 30-45 min  [x] Determine plan of exercise following rehab  [x] Attend Exercise workshops Judithe July achieved exercise goals?     []  Yes    [] No  If no, why?  **  [] Increased 6 min walk distance by 10%  [] Currently exercising 30-60 min/day, 5-7days/wk   [] Plans to continue exercise on own  [] Plans to join a local fitness center to continue exercise  [] Does not plan to continue to exercise after rehab   Mutually agreed upon goals:  Trev Delacruz would like to have strength and engery to play with grand kids Progress towards mutually agreed upon goals:  Trev Delacruz  is working on strength and endurance to improve his ability to play with the grand kids.  Progress towards mutually agreed upon goals:  rTev Delacruz  ** Progress towards mutually agreed upon goals:  Trev Delacruz ** Progress towards mutually agreed upon goals:  Trev Delacruz  ** Progress towards mutually agreed upon goals:  Trev Delacruz  **    *MET level required for above goal:  4-5 METs MET level Achieved: 2.2 METs MET level Achieved:  **METs MET level Achieved:  **METs MET level Achieved:  **METs MET level Achieved:  **METs     Individual Cardiac Treatment Plan - Nutrition  NUTRITION  ASSESSMENT/PLAN NUTRITION  REASSESSMENT NUTRITION   REASSESSMENT NUTRITION   REASSESSMENT NUTRITION  DISCHARGE/FOLLOW-UP NUTRITION  DISCHARGE/FOLLOW-UP   Stages of Change Stages of Change Stages of Change Stages of Change Stages of Change Stages of Change   [] Pre Contemplation  [x] Contemplation  [] Preparation  [] Action  [] Maintenance  [] Relapse [x] Pre Contemplation  [] Contemplation  [] Preparation  [] Action  [] Maintenance  [] Relapse [] Pre Contemplation  [] Contemplation  [] Preparation  [] Action  [] Maintenance  [] Relapse [] Pre Contemplation  [] Contemplation  [] Preparation  [] Action  [] Maintenance  [] Relapse [] Pre Contemplation  [] Contemplation  [] Preparation  [] Action  [] Maintenance  [] Relapse [] Pre Contemplation  [] Contemplation  [] Preparation  [] Action  [] Maintenance  [] Relapse   NUTRITION ASSESSMENT NUTRITION ASSESSMENT NUTRITION ASSESSMENT NUTRITION ASSESSMENT NUTRITION ASSESSMENT NUTRITION ASSESSMENT   Weight Management  Weight: 197        Height: 5'10\"   BMI: 28.3  Weight Management  Weight: 197                Weight Management  Weight: **                  Weight Management  Weight: ** Weight Management  Weight: ** Weight Management  Weight: **                    BMI: **   Eating Plan  Current eating habits: try to follow diabetic diet Eating Plan  Changes: none reported Eating Plan  Changes: Eating Plan  Changes: Eating Plan  Changes: Eating Plan Improvements:   Alcohol Use  [x] none          [] daily  [] weekly      [] special           Diet Assessment Tool:  RATE YOUR PLATE  *Given to patient to complete and return. Diet Assessment Tool:    Score: 51/72        Diet Assessment Tool: RATE YOUR PLATE  Score: **/80   NUTRITION PLAN NUTRITION PLAN NUTRITION PLAN NUTRITION PLAN NUTRITION PLAN NUTRITION PLAN   *Interventions* *Interventions* *Interventions* *Interventions* *Interventions* *Interventions*   Initial Survey given Goal Setting Discussion:   [x] Yes      [] No        Follow Up Survey Reviewed & Goals Updated:     Professional Referral  Please check if needed. [] Dietitian Consult   [] Wt. Management Referral  [] Other:  Professional Referral  Please check if needed. [] Dietitian Consult   [] Wt. Management Referral  [] Other: Professional Referral  Please check if needed. [] Dietitian Consult   [] Wt. Management Referral  [] Other: Professional Referral  Please check if needed. [] Dietitian Consult   [] Wt. Management Referral  [] Other: Professional Referral  Please check if needed. [] Dietitian Consult   [] Wt. Management Referral  [] Other: Professional Referral  Please check if needed. [] Dietitian Consult   [] Wt.  Management Referral  [] Other:   *Education* *Education* *Education* *Education* *Education* *Education*   Nutritional Education Recommended    [x] 1:1 Registered Dietitian    Workshops  [x] Label Reading   [x] Menu  [x] Targeting Nutrition Priorities  [x] Mindful Eating   Nutritional Education Attended/Date    12/20/22  1:1 Registered Dietitian Nutritional Education Attended/Date Nutritional Education Attended/Date Nutritional Education Attended/Date All Sessions Completed? [] Yes  [] No   Cooking School  Recommended     [x] Adding Flavor  [x] Fast & Healthy     Breakfasts  [x] Salads & Dressings  [x] Savory Soups  [x] Simple Sides & Sauces  [x] Appetizers &     Snacks  [x] Delicious Desserts  [x] Plant-Based Proteins  [x] Fast Evening Meals  [x] Weekend Breakfasts  [x] Efficiency Cooking  [x] One-Pot [employer]U [employer] School  Sessions Completed    No Friday Cooking School  Sessions Completed [facility]  Sessions Completed     [facility]  Sessions Completed Cooking School    # of sessions completed:  **   *Goals* *Goals* *Goals* *Goals* *Goals* *Goals*   Luiss nutritional goals are as follows:  Complete and return diet survey Tereso's nutritional goals are as follows:  [x] Attend Nutrition Workshops  [x] Attend 1:1   [] Attend Cooking Classes  [] ** Luiss nutritional goals are as follows:  [] Attend Nutrition Workshops  [] Attend 1:1   [] Attend Cooking Classes  [] Complete and return diet survey  [] ** Tereso's nutritional goals are as follows:  [] Attend Nutrition Workshops  [] Attend 1:1   [] Attend Cooking Classes  [] ** Tereso's nutritional goals are as follows:  [] Attend Nutrition Workshops  [] Attend 1:1   [] Attend Cooking Classes  [] ** Joseph Wang achieved nutritional goals   [] Yes    [] No  If no, why?   Use knowledge gained to continue Pritikin eating plan at home       Individual Cardiac Treatment Plan - Psychosocial  PSYCHOSOCIAL  ASSESSMENT/PLAN PSYCHOSOCIAL  REASSESSMENT PSYCHOSOCIAL   REASSESSMENT PSYCHOSOCIAL   REASSESSMENT PSYCHOSOCIAL  DISCHARGE/FOLLOW-UP PSYCHOSOCIAL  DISCHARGE/FOLLOW-UP   Stages of Change Stages of Change Stages of Change Stages of Change Stages of Change Stages of Change   [x] Pre Contemplation  [] Contemplation  [] Preparation  [] Action  [] Maintenance  [] Relapse [] Pre Contemplation  [] Contemplation  [] Preparation  [x] Action  [] Maintenance  [] Relapse [] Pre Contemplation  [] Contemplation  [] Preparation  [] Action  [] Maintenance  [] Relapse [] Pre Contemplation  [] Contemplation  [] Preparation  [] Action  [] Maintenance  [] Relapse [] Pre Contemplation  [] Contemplation  [] Preparation  [] Action  [] Maintenance  [] Relapse [] Pre Contemplation  [] Contemplation  [] Preparation  [] Action  [] Maintenance  [] Relapse   PSYCHOSOCIAL ASSESSMENT PSYCHOSOCIAL ASSESSMENT PSYCHOSOCIAL ASSESSMENT PSYCHOSOCIAL ASSESSMENT PSYCHOSOCIAL ASSESSMENT PSYCHOSOCIAL ASSESSMENT   Behavioral Outcomes Behavioral Outcomes Behavioral Outcomes Behavioral Outcomes Behavioral Outcomes Behavioral Outcomes   PHQ-9 score 1  Depression Severity  [x]Minimal  []Mild   []Moderate  []Moderately Severe  []Severe    PHQ-9 score **  Depression Severity  []Minimal  []Mild   []Moderate  []Moderately Severe []Severe PHQ-9 score **  Depression Severity  []Minimal  []Mild   []Moderate  []Moderately Severe []Severe   Does patient have Family Support? [x] Yes      [] No  No signs of marital/family distress        Within the Past Month:  *Have you wished you were dead or wished you could go to sleep and not wake up? [] Yes      [x] No  *Have you had any thoughts of killing yourself? [] Yes      [x] No          Using a scale of 0-10, 0=none, 10=very:   Rate your depression: 0  Rate your anxiety:  0  Using a scale of 0-10, 0=none, 10=very:   Rate your depression: 0  Rate your anxiety:  0 Using a scale of 0-10, 0=none, 10=very:   Rate your depression: **  Rate your anxiety:  ** Using a scale of 0-10, 0=none, 10=very:   Rate your depression: **  Rate your anxiety:  ** Using a scale of 0-10, 0=none, 10=very:   Rate your depression: **  Rate your anxiety:  ** Using a scale of 0-10, 0=none, 10=very:   Rate your depression: **  Rate your anxiety:  **   Signs and Symptoms of Depression Present? [x] Yes       No  Poor eye contact Signs and Symptoms of Depression Present?     [] Yes      [x] No   Signs and Symptoms of Depression Present? [] Yes      [] No  If yes, please explain:  ** Signs and Symptoms of Depression Present? [] Yes      [] No  If yes, please explain:  ** Signs and Symptoms of Depression Present? [] Yes      [] No  If yes, please explain:  ** Signs and Symptoms of Depression Present? [] Yes      [] No  If yes, please explain:  **   Signs and Symptoms of Anxiety Present? [] Yes      [x] No   Signs and Symptoms of Anxiety Present? [] Yes      [x] No   Signs and Symptoms of Anxiety Present? [] Yes      [] No  If yes, please explain:  ** Signs and Symptoms of Anxiety Present? [] Yes      [] No  If yes, please explain:  ** Signs and Symptoms of Anxiety Present? [] Yes      [] No  If yes, please explain:  ** Signs and Symptoms of Anxiety Present? [] Yes      [] No  If yes, please explain:  **   [x] Patient has poor eye contact   [] Flat affect present. [] Signs of anxiety, anger or hostility    [] Signs social isolation present. []  Signs of alcohol or substance abuse        PSYCHOSOCIAL PLAN PSYCHOSOCIAL PLAN PSYCHOSOCIAL PLAN PSYCHOSOCIAL PLAN PSYCHOSOCIAL PLAN PSYCHOSOCIAL PLAN   *Interventions* *Interventions* *Interventions* *Interventions* *Interventions* *Interventions*   *Please check if needed  [] Psych Consult  [] Physician Referral  [x] Stress Management Skills *Please check if needed  [] Psych Consult  [] Physician Referral  [x] Stress Management Skills *Please check if needed  [] Psych Consult  [] Physician Referral  [] Stress Management Skills *Please check if needed  [] Psych Consult  [] Physician Referral  [] Stress Management Skills *Please check if needed  [] Psych Consult  [] Physician Referral  [] Stress Management Skills *Please check if needed  [] Psych Consult  [] Physician Referral  [] Stress Management Skills   Is patient currently taking anti-depressant or anti-anxiety medications? [] Yes      [x] No   Change in anti-depressant or anti-anxiety medications?   [] Yes [x] No  If yes, please list medications:  ** Change in anti-depressant or anti-anxiety medications? [] Yes      [] No  If yes, please list medications:  ** Change in anti-depressant or anti-anxiety medications? [] Yes      [] No  If yes, please list medications:  ** Change in anti-depressant or anti-anxiety medications? [] Yes      [] No  If yes, please list medications:  ** Change in anti-depressant or anti-anxiety medications? [] Yes      [] No  If yes, please list medications:  **   *Education* *Education* *Education* *Education* *Education* *Education*   Healthy Mind-Set Workshops Recommended  [x] Stress & Health  [x] Taking Charge of Stress  [x] New Thoughts, New Behaviors  [x] Managing Moods & Relationships Healthy Mind-Set Workshops Attended/Date    12/13/22 Taking Charge of Stress Healthy Mind-Set Workshops Attended/Date Healthy Mind-Set Workshops Attended/Date Healthy Mind-Set Workshops  Completed  [] Yes      [] No Healthy Mind-Set Workshops  Completed  [] Yes      [] No   *Goals* *Goals* *Goals* *Goals* *Goals* *Goals*   Tereso's psychosocial goals are as follows:  Complete HADS & [employer] [place], Quality of Life Index, Cardiac Version IV Tereso's psychosocial goals are as follows:  [x] Attend Healthy Mind-Set Workshops  [x] Reduce depression symptom severity by 1 level  [] ** Tereso's psychosocial goals are as follows:  [] Attend Healthy Mind-Set Workshops  [] Reduce depression symptom severity by 1 level  [] ** Tereso's psychosocial goals are as follows:  [] Attend Healthy Mind-Set Workshops  [] Reduce depression symptom severity by 1 level  [] Ekta Donohue achieved psychosocial goals? [] Yes    [] No  If no, why?  **  [] Use methods learned to continue to reduce depression symptom severity by 1 level  [] Ekta Donohue achieved psychosocial goals?   [] Yes    [] No  If no, why?  **  [] Use methods learned to continue to reduce depression symptom severity by 1 level  [] **     Individual Cardiac Treatment Plan - Other:  Risk Factor/Education  RISK FACTOR  ASSESSMENT/PLAN RISK FACTOR  REASSESSMENT  RISK FACTOR  REASSESSMENT RISK FACTOR  REASSESSMENT RISK FACTOR   DISCHARGE/FOLLOW-UP RISK FACTOR   DISCHARGE/FOLLOW-UP   Stages of Change Stages of Change Stages of Change Stages of Change Stages of Change Stages of Change   [x] Pre Contemplation  [] Contemplation  [] Preparation  [] Action  [] Maintenance  [] Relapse [] Pre Contemplation  [x] Contemplation  [] Preparation  [] Action  [] Maintenance  [] Relapse [] Pre Contemplation  [] Contemplation  [] Preparation  [] Action  [] Maintenance  [] Relapse [] Pre Contemplation  [] Contemplation  [] Preparation  [] Action  [] Maintenance  [] Relapse [] Pre Contemplation  [] Contemplation  [] Preparation  [] Action  [] Maintenance  [] Relapse [] Pre Contemplation  [] Contemplation  [] Preparation  [] Action  [] Maintenance  [] Relapse   RISK FACTOR/EDUCATION ASSESSMENT RISK FACTOR/EDUCATION ASSESSMENT RISK FACTOR/EDUCATION ASSESSMENT RISK FACTOR/EDUCATION ASSESSMENT RISK FACTOR /EDUCATION ASSESSMENT RISK FACTOR /EDUCATION ASSESSMENT   Hypertension  [x] Yes      [] No    Resting BP: 86/42  Peak Ex BP:132/62  Medication: metoprolol, amlodipine, entresto   Hypertension  Resting BP: 114/62  Peak Ex BP:112/58  Medication Changes:  [] Yes      [x] No Hypertension  Resting BP: **  Peak Ex BP:**  Medication Changes:  [] Yes      [] No Hypertension  Resting BP: **  Peak Ex BP:**  Medication Changes:  [] Yes      [] No Hypertension  Resting BP: **  Peak Ex BP:**  Medication Changes:  [] Yes      [] No Hypertension  Resting BP: **  Peak Ex BP:**  Medication Changes:  [] Yes      [] No   Lipids  HLD/DLD  [x] Yes      [] No  TOTAL CHOL: 236  HDL:  46  LDL:  163  TRI  Medication: atorvastatin Lipids  Medication Changes:  [] Yes      [x] No     Lipids  Medication Changes:  [] Yes      [] No     Lipids  Medication Changes:  [] Yes      [] No     Lipids    TOTAL CHOL: **  HDL: **  LDL:  **  TRIG:  **  Medication Changes:  [] Yes      [] No Lipids    TOTAL CHOL: **  HDL:  **  LDL:  **  TRIG:  **  Medication Changes:  [] Yes      [] No   Diabetes  [x] Yes      [] No  FBS: 201           HbA1C: 8.7        Monitor BS @ home:   [x] Yes      [] No  Frequency: 2 x per week  Medication: empagliflozin- metformin, linagliptin Diabetes  Most Recent BS:  BS have been in range  [] Yes      [x] No  Medication Changes  [] Yes      [x] No   Diabetes  Most Recent BS:  BS have been in range  [] Yes      [] No  Medication Changes  [] Yes      [] No     Diabetes  Most Recent BS:  BS have been in range  [] Yes      [] No  Medication Changes  [] Yes      [] No     Diabetes  Most Recent BS:  BS have been in range  [] Yes      [] No  Medication Changes  [] Yes      [] No Diabetes  Most Recent BS:  BS have been in range  [] Yes      [] No  Medication Changes  [] Yes      [] No       Tobacco Use  [x] Current  [] Former  [] Never    Years smoked: 40+:      Quit date set:  [x] Yes      [] No  Date: 1/1/23  # cigarettes smoked/day: 8  Smokeless Tobacco use:   [] Yes      [x] No   Tobacco Use  Change in smoking status   [] Yes      [x] No    Continues to smoke 8 cigs/day   Tobacco Use  Change in smoking status   [] Yes      [] No    Quit date: **   Tobacco Use  Change in smoking status   [] Yes      [] No    Quit date: ** Tobacco Use  Change in smoking status   [] Yes      [] No    Quit date: ** Tobacco Use  Change in smoking status   [] Yes      [] No    Quit date: **             Learning Barriers  Please select one:  [] Speech  [] Literacy  [] Hearing  [] Cognitive  [] Vision  [x] Ready to Learn Learning Barriers Addressed:   [] Yes      [] No   Learning Barriers Addressed:   [] Yes      [] No   Learning Barriers Addressed:  [] Yes      [] No Learning Barriers Addressed:  [] Yes      [] No Learning Barriers Addressed:  [] Yes      [] No     RISK FACTOR/EDUCATION PLAN RISK FACTOR/EDUCATION PLAN RISK FACTOR/EDUCATION PLAN RISK FACTOR/EDUCATION PLAN RISK FACTOR/EDUCATION PLAN RISK FACTOR/EDUCATION PLAN   *Interventions* *Interventions* *Interventions* *Interventions* *Interventions* *Interventions*   Recommended Educational Videos    [x] Overview of The Pritikin Eating Plan  [x] Heart Disease Risk Reduction  [x] Move It! [x] Calorie Density  [x] Healthy Minds, Bodies, Hearts  [x] Label Reading  [x] Metabolic Syndrome & Belly   Or  [] How Our Thoughts Can Heal our Heart  [x] Dining Out-Part 1  [x] Biomechanical Limitations  [x] Facts on Fat  [x] Hypertension & Heart Disease  [x] Diseases of Our Times-Focusing on Diabetes  [x] Body Composition  [x] Nurtition Action Plan  [x] Exercise Action Plan   Completed Videos/Date      12/1/22  Overview of The Pritikin Eating Plan    12/8/22  Heart Disease Risk Reduction  []  12/15/22  Move It!    12/29/22  Calorie Density Completed Videos/Date Completed Videos/Date Completed Videos/Date Recommended Educational Videos Completed    [] Yes      [] No    **If not completed, Why? **           Smoking Cessation/Relaspe Prevention Intervention needed? [x] Yes      [] No  *Pt verbalizes and agrees to attend intervention Smoking Cessation/Relapse Prevention Scheduled? [x] Yes      [] No   Smoking Cessation/Relapse Prevention completed? [] Yes      [] No  Date: ** Smoking Cessation/Relapse Prevention completed? [] Yes      [] No  Date: ** Smoking Cessation/Relapse Prevention completed?    [] Yes      [] No  Date: ** Smoking Cessation Counseling attended  [] Yes      [] No  **If not completed, Why? **   Professional Referrals:  *Please check if needed  [] Diabetes Clinic  [] Lipid Clinic   [] Other:      Professional Referrals:  *Please check if needed  [] Diabetes Clinic  [] Lipid Clinic   [] Other:   Preventative Medication Preventative Medication Preventative Medication Preventative Medication Preventative Medication Preventative Medication   Aspirin  [x] Yes    [] No  Blood Thinner: Clopidogrel/Effient/Brillinta  [x] Yes    [] No  Beta Blocker  [x] Yes    [] No  Ace Inhibitor  [] Yes    [x] No  Statin/Lipid Lowering  [x] Yes    [] No Medication Changes? [] Yes    [x] No Medication Changes? [] Yes    [] No Medication Changes? [] Yes    [] No Medication Changes? [] Yes    [] No Medication Changes? [] Yes    [] No   *Education* *Education* *Education* *Education* *Education* *Education*   Does Hurshel Pong require any additional education? [] Yes    [] No   Does Hurshel Pong require any additional education? [x] Yes    [] No Does Hurshel Pong require any additional education? [] Yes    [] No Does Hurshel Pong require any additional education? [] Yes    [] No Does Hurshel Pong require any additional education? [] Yes    [] No Does Hurshel Pong require any additional education?   [] Yes    [] No   Additional Educational Videos    Exercise  [] Improve Performance    Medical  [] Aging Enhancing Your QoL  [] Biology of Weight Control  [] Decoding Lab Results  [] Diseases of Our Time - Overview  [] Sleep Disorders    Nutrition  [] Dining Out - Part 2  [] Fueling a Healthy Body  [] Menu Workshop  [] Planning Your Eating Strategy  [] Targeting Your Nutrition Priorities  [] Vitamins & Minerals    Healthy Mind-Set  [x] Smoking Cessation    Culinary  []Becoming a Pritikin    [] Cooking - Breakfast & Snacks  [] Cooking -Healhty Salads & Dressing  [] Cooking -Dinner & Sides  [] Cooking -Soups & Desserts    Overview  [] The Pritikin Solution Additional Educational Videos Completed Additional Educational Videos Completed Additional Educational Videos Completed Additional Educational Videos Completed Additional Educational Videos Completed    [] Yes    [] No   *Goals* *Goals* *Goals* *Goals* *Goals* *Goals*   Priyanka risk factor/education goals are as follows:    [x] Optimal BP <140/90  [x] Blood Sugar <120  [x] Attend recommended video education sessions  [x] Takes medications as prescribed 100% of the time   [] ** Priyanka risk factor/education goals are as follows:    [x] Optimal BP <140/90  [x] Blood Sugar <120  [x] Attend recommended video education sessions  [x] Takes medications as prescribed 100% of the time   [] ** Tereso's risk factor/education goals are as follows:    [x] Optimal BP <140/90  [] Blood Sugar <120  [x] Attend recommended video education sessions  [x] Takes medications as prescribed 100% of the time   [] ** Tereso's risk factor/education goals are as follows:    [x] Optimal BP <140/90  [] Blood Sugar <120  [x] Attend recommended video education sessions  [x] Takes medications as prescribed 100% of the time   [] ** Tereso's risk factor/education goals are as follows:    [x] Optimal BP <140/90  [] Blood Sugar <120  [x] Attend recommended video education sessions  [x] Takes medications as prescribed 100% of the time   [] Katie Ramirez achieved risk factor goals?   [] Yes    [] No  If no, why?  **     Monitored telemetry has revealed NSR with occ PVC    [x] associated symptoms SOB Monitored telemetry has revealed NSR   Monitored telemetry has revealed  [] documented arrhythmia at increasing workloads  [] associated symptoms ** Monitored telemetry has revealed **  [] documented arrhythmia at increasing workloads  [] associated symptoms ** Monitored telemetry has revealed **  [] documented arrhythmia at increasing workloads  [] associated symptoms ** Monitored telemetry has revealed **  [] documented arrhythmia at increasing workloads  [] associated symptoms **   Physician Response    [x] Cardiac rehab is reasonably and medically necessary for continuous cardiac monitoring surveillance  of patient's cardiac activity  [x] Initiate continuous telemetry monitoring and notify me with any concerns  [] Other   Physician Response    [x] Cardiac rehab is reasonably and medically necessary for continuous cardiac monitoring surveillance  of patient's cardiac activity  [x] Continue continuous telemetry monitoring and notify me with any concerns  [] Other     Physician Response    [x] Cardiac rehab is reasonably and medically necessary for continuous cardiac monitoring surveillance  of patient's cardiac activity  [x] Continue continuous telemetry monitoring and notify me with any concerns   [] Other     Physician Response    [x] Cardiac rehab is reasonably and medically necessary for continuous cardiac monitoring surveillance  of patient's cardiac activity  [x] Continue continuous telemetry monitoring and notify me with any concerns   [] Other     Physician Response    [x] Cardiac rehab is reasonably and medically necessary for continuous cardiac monitoring surveillance  of patient's cardiac activity  [x] Continue continuous telemetry monitoring and notify me with any concerns   [] Other      Cosigned by: Rebekah Garcia MD at 12/5/2022  3:33 PM     Revision History  Date/Time User Provider Type Action   12/5/2022  3:33 PM Rebekah Garcia MD Physician Cosign   12/1/2022  1:28 PM CARLOTA Dunham Exercise Physiologist Sign

## 2023-01-03 ENCOUNTER — HOSPITAL ENCOUNTER (OUTPATIENT)
Dept: CARDIAC REHAB | Age: 63
Setting detail: THERAPIES SERIES
Discharge: HOME | End: 2023-01-03
Payer: MEDICAID

## 2023-01-03 PROCEDURE — G0422 INTENS CARDIAC REHAB W/EXERC: HCPCS

## 2023-01-03 PROCEDURE — G0423 INTENS CARDIAC REHAB NO EXER: HCPCS

## 2023-01-03 NOTE — PROGRESS NOTES
Radha PADRON.:  1960    Acct Number: [de-identified]   MRN:  [id number]                             [facility] HEALTHY MIND-SET WORKSHOP             Date: 1/3/2023        Session #________    Caroline Lua class covered:    []  New Thoughts New Behaviors Workshop:  Patient will learn and practice techniques for developing effective health and lifestyle goals. Patient will be able to effectively apply the goal setting process learned to develop at least one new personal goal.     []  Managing Moods & Relationships Workshop:  Patient will learn how emotional and chronic stress factors can impact their hearts. They will learn healthy ways to handle stress and utilize positive coping mechanisms. In addition, [facility] patient will learn ways to improve communication skills. []  Healthy Sleep for a healthy Heart:  Patients will learn the importance of sleep to overall health, well-being, and quality of life. They will understand the symptoms of, and treatments for, common sleep disorders. Patients will also be able to identify daytime and nighttime behaviors which impact sleep, and they will be able to apply these tools to help manage sleep-related challenges. [x]  Recognizing and Reducing Stress:  Patients will learn about stress and how to recognize stress. Patients will gain insight into the toll that chronic stress takes on their health, both emotionally and physically. Patients will learn and practice a variety of stress management techniques. Patients will be able to effectively apply coping mechanisms in perceived stressful situations. Magali Tamayo actively participated and verbalized understanding. Total time in the Healthy Mind-Set class was 60 minutes.     Electronically signed by BUSTER Daley on 1/3/2023 at 3:06 PM

## 2023-01-05 ENCOUNTER — APPOINTMENT (OUTPATIENT)
Dept: CARDIAC REHAB | Age: 63
End: 2023-01-05
Payer: MEDICAID

## 2023-01-10 ENCOUNTER — HOSPITAL ENCOUNTER (OUTPATIENT)
Dept: CARDIAC REHAB | Age: 63
Setting detail: THERAPIES SERIES
Discharge: HOME | End: 2023-01-10
Payer: MEDICAID

## 2023-01-10 PROCEDURE — G0423 INTENS CARDIAC REHAB NO EXER: HCPCS

## 2023-01-10 PROCEDURE — G0422 INTENS CARDIAC REHAB W/EXERC: HCPCS

## 2023-01-10 NOTE — PROGRESS NOTES
Video Education Report - ICR/CR  Name:  Rachael Cotton     Date:  1/10/2023  MRN: [id number]     Session #:    Session Length: 40 min    Core Videos        []Heart Disease Risk Reduction       []Overview of Pritikin Eating Plan      []Move it          []Calorie Density         [x]Healthy Minds, Bodies, Hearts        []Label Reading - Part 1       []Metabolic Syndrome and Belly Fat        []How Our Thoughts Can Heal Our Hearts   []Dining Out - Part 1      []Biomechanical Limitations  []Facts on Fat        []Hypertension & Heart Disease    []Diseases of Our Time - Focusing on Diabetes   []Body Composition  []Nutrition Action Plan    []Exercise Action Plan    Exercise      Healthy Mind-Set  []Improving Performance    []Smoking Cessation    []Introduction to [address]  []Aging-Enhancing the Quality of Your Life  []Becoming a Pritikin   []Biology of Weight Control    []Cooking Breakfasts   []Decoding Lab Results    and Snacks  []Diseases of Our Time - Overview   []Cooking Dinner and   []Sleep Disorders     Sides  []Targeting Your Nutrition Priorities   []Healthy Salads &   Dressings  Nutrition      []Cooking Soups and   []Dining Out - Part 2     Desserts  []Fueling a Healthy Body   []Menu Workshop     Overview  []Planning Your Eating Strategy   []The Pritikin Solution  []Vitamins and Minerals    Comments:  Video completed, group discussion

## 2023-01-12 ENCOUNTER — HOSPITAL ENCOUNTER (OUTPATIENT)
Dept: CARDIAC REHAB | Age: 63
Setting detail: THERAPIES SERIES
End: 2023-01-12
Payer: MEDICAID

## 2023-01-12 ENCOUNTER — HOSPITAL ENCOUNTER (OUTPATIENT)
Dept: CARDIAC REHAB | Age: 63
Setting detail: THERAPIES SERIES
Discharge: HOME | End: 2023-01-12
Payer: MEDICAID

## 2023-01-12 PROCEDURE — G0422 INTENS CARDIAC REHAB W/EXERC: HCPCS

## 2023-01-17 ENCOUNTER — HOSPITAL ENCOUNTER (OUTPATIENT)
Dept: CARDIAC REHAB | Age: 63
Setting detail: THERAPIES SERIES
Discharge: HOME | End: 2023-01-17
Payer: MEDICAID

## 2023-01-17 ENCOUNTER — HOSPITAL ENCOUNTER (OUTPATIENT)
Dept: CARDIAC REHAB | Age: 63
Setting detail: THERAPIES SERIES
End: 2023-01-17
Payer: MEDICAID

## 2023-01-17 PROCEDURE — G0422 INTENS CARDIAC REHAB W/EXERC: HCPCS

## 2023-01-19 ENCOUNTER — HOSPITAL ENCOUNTER (OUTPATIENT)
Dept: CARDIAC REHAB | Age: 63
Setting detail: THERAPIES SERIES
End: 2023-01-19
Payer: MEDICAID

## 2023-01-19 ENCOUNTER — HOSPITAL ENCOUNTER (OUTPATIENT)
Dept: CARDIAC REHAB | Age: 63
Setting detail: THERAPIES SERIES
Discharge: HOME | End: 2023-01-19
Payer: MEDICAID

## 2023-01-19 PROCEDURE — G0422 INTENS CARDIAC REHAB W/EXERC: HCPCS

## 2023-01-23 RX ORDER — EMPAGLIFLOZIN AND METFORMIN HYDROCHLORIDE 5; 1000 MG/1; MG/1
TABLET ORAL
Qty: 60 TABLET | Refills: 5 | Status: SHIPPED | OUTPATIENT
Start: 2023-01-23

## 2023-01-24 ENCOUNTER — HOSPITAL ENCOUNTER (OUTPATIENT)
Dept: CARDIAC REHAB | Age: 63
Setting detail: THERAPIES SERIES
Discharge: HOME | End: 2023-01-24
Payer: MEDICAID

## 2023-01-24 ENCOUNTER — APPOINTMENT (OUTPATIENT)
Dept: CARDIAC REHAB | Age: 63
End: 2023-01-24
Payer: MEDICAID

## 2023-01-24 PROCEDURE — G0422 INTENS CARDIAC REHAB W/EXERC: HCPCS

## 2023-01-26 ENCOUNTER — APPOINTMENT (OUTPATIENT)
Dept: CARDIAC REHAB | Age: 63
End: 2023-01-26
Payer: MEDICAID

## 2023-01-26 ENCOUNTER — HOSPITAL ENCOUNTER (OUTPATIENT)
Dept: CARDIAC REHAB | Age: 63
Setting detail: THERAPIES SERIES
End: 2023-01-26
Payer: MEDICAID

## 2023-01-26 RX ORDER — CLOPIDOGREL BISULFATE 75 MG/1
TABLET ORAL
Qty: 90 TABLET | Refills: 2 | OUTPATIENT
Start: 2023-01-26

## 2023-01-26 NOTE — PLAN OF CARE
[address] [place] [address] Facility-Based Program  Individualized Cardiac Treatment Plan    Patient Name:  Flavia Morrow  :  1960  Age:  58 y.o. MRN:  [id number]  Diagnosis: PCI  Date of Event: 10/20/22   Physician:  Dante Downey Office Visit:    Date Entered Program: 22  Risk Stratifications: [] Low [] Intermediate [x] High  Allergies: Not on File    COVID -19 Screen  Do you have any of the following symptoms:  [] Fever [] Cough [] SOB [] Muscle/Body Ache [] Loss of taste/smell [x] None  []  Have you traveled outside of the [place]? [] Yes      x No    Have you been around anyone who has tested Positive for COVID 19?  [] Yes      [x] No    The following Education has been completed with patient. xWait in the lobby until we call you back to rehab. [x][] Bring your own bottle of water with you.       Individual Cardiac Treatment Plan -EXERCISE  INITIAL 30 DAY 60 DAY 90  DAY FINAL DAY   EXERCISE  ASSESSMENT/PLAN EXERCISE  REASSESSMENT EXERCISE   REASSESSMENT EXERCISE   REASSESSMENT EXERCISE   REASSESSMENT EXERCISE  DISCHARGE/FOLLOW-UP   Date: 22 Date: 22 Date: 23 Date: Date: Date:   Session #1  First Exercise Session:  ** Session # 12 Session # 24 Session # ** Session # ** Session # **  Last Exercise Session:  **   Stages of Change Stages of Change Stages of Change Stages of Change Stages of Change Stages of Change   [x] Pre Contemplation  [] Contemplation  [] Preparation  [] Action  [] Maintenance  [] Relapse [] Pre Contemplation  [] Contemplation  [] Preparation  [x] Action  [] Maintenance  [] Relapse [] Pre Contemplation  [] Contemplation  [] Preparation  [x] Action  [] Maintenance  [] Relapse [] Pre Contemplation  [] Contemplation  [] Preparation  [] Action  [] Maintenance  [] Relapse [] Pre Contemplation  [] Contemplation  [] Preparation  [] Action  [] Maintenance  [] Relapse [] Pre Contemplation  [] Contemplation  [] Preparation  [] Action  [] Maintenance  [] Relapse   EXERCISE ASSESSMENT EXERCISE ASSESSMENT EXERCISE ASSESSMENT EXERCISE ASSESSMENT EXERCISE ASSESSMENT EXERCISE ASSESSMENT   6 Min Walk Test  Distance walked:   0.14 miles  739 ft.  2.1 METs  Max HR:108 BPM      RPE:  12    THR:  116-130  Rhythm:  NSR with occ PVC  Pt not here for reassessment. Pt has lifevest.    6 Min Walk Test  Distance walked:   ** miles  ** ft  ** METs  Max HR:** BPM      RPE:  **  %Change ft= **    Rhythm:  **   DASI: 4.7 METs DASI: 5.1  METs DASI: na DASI: ** METs DASI: ** METs DASI: ** METs   Return to Work  [employer] on returning to work? [] Yes              [] No   [x] Disabled     [x] Retired        Return to work: Has Renella Citron returned to work? Retired. Return to work: Has Renella Citron returned to work? [] Yes    [] No    Return to work date set? [] Yes, **    [] No    Renella Citron is doing ** at work. Return to work: Has Renella Citron returned to work? [] Yes    [] No    Return to work date set? [] Yes, **    [] No    Renella Citron is doing ** at work. Return to work: Has Renella Citron returned to work? [] Yes    [] No    Return to work? [] Yes, **    [] No    *Required MET Level achieved for job duties?    [] Yes    [] No   Orthopedic Limitations/  [x] Yes    [] No  If yes please list:  foot pain     Orthopedic Limitations  *If patient has orthopedic issue:   Actions/  accomodations needed to make Renella Citron successful : no TM Orthopedic Limitations    na   Orthopedic Limitations   Orthopedic Limitations Orthopedic Limitations     Fall Risk    [x]Assessed as a fall risk  [] Not a fall risk      [x] Balance Issues       [] Arbutus Showman        [] Cane       [] Wheelchair  Actions needed:  encouraged to use a cane if unbalanced    [x] Safety issues reviewed      Fall Risk  *If patient is a fall risk, action needed to accommodate: Pt does not use the TM Fall Risk    na Fall Risk Fall Risk Fall Risk   Home Exercise  [] Yes    [x] No   Home Exercise  [x] Yes [] No  Type: walking  Frequency: 4x per week  Duration: 5 min Home Exercise  [x] Yes    [] No  Type: walking  Frequency:4 x per week  Duration: 5 min Home Exercise  [] Yes    [] No  Type: **  Frequency: **  Duration: ** Home Exercise  [] Yes    [] No  Type: **  Frequency: **  Duration: ** Home Exercise  [] Yes    [] No  Type: **  Frequency: **  Duration: **   Angina with Activity? [x] Yes    [] No  Angina Management: rest, nitro Angina with Activity? [] Yes    [x] No  Angina Management: na Angina with Activity? [] Yes    [x] No  Angina Management: na Angina with Activity? [] Yes    [] No  Angina Management: ** Angina with Activity? [] Yes    [] No  Angina Management: ** Angina with Activity?   [] Yes    [] No  Angina Management: **   EXERCISE PLAN EXERCISE PLAN EXERCISE PLAN EXERCISE PLAN EXERCISE PLAN EXERCISE PLAN   *Interventions* *Interventions* *Interventions* *Interventions* *Interventions* *Interventions*   Exercise Prescription  (per physician & CR staff) Exercise Prescription  (per physician & CR staff) Exercise Prescription  (per physician & CR staff) Exercise Prescription  (per physician & CR staff) Exercise Prescription  (per physician & CR staff) Exercise Prescription  (per physician & CR staff)   Cardiovascular Cardiovascular Cardiovascular Cardiovascular Cardiovascular Cardiovascular   Mode:    [x] Treadmill (TM)  [x] Schwinn Airdyne (AD)  [x] Arms Ergometer (AE)  [] NuStep  [] Elliptical (E) MODE:    [] Treadmill (TM)  [x] Schwinn Airdyne (AD)  [x] Arms Ergometer (AE)  [x] NuStep  [] Elliptical (E) MODE:    [x] Treadmill (TM)  [x] Schwinn Airdyne (AD)  [x] Arms Ergometer (AE)  [x] NuStep  [] Elliptical (E) MODE:    [] Treadmill (TM)  [] Schwinn Airdyne (AD)  [] Arms Ergometer (AE)  [] NuStep  [] Elliptical (E) MODE:    [] Treadmill (TM)  [] Schwinn Airdyne (AD)  [] Arms Ergometer (AE)  [] NuStep  [] Elliptical (E) MODE:    [] Treadmill (TM)  [] Schwinn Airdyne (AD)  [] Arms Ergometer (AE)  [] NuStep  [] Elliptical (E)   Initial Workloads  TM: [email or web address] 2.1 METs  AD: 0.7 level = 2.1 METs  NS: 36  Heck= 2.1 METs  AE: 0.4 level = 1.6 METs Current Workloads    AD:  27 Heck =  2.3METs  NS:   42Watts= 2.2 METs  AE:  24 Heck =  METs Current Workloads  TM:  1.4@ 0%= 2.1 METs  AD: 35 heck = 2.3 METs  NS: 40 Heck= 2.2 METs  AE: 24 heck Current Workloads  TM:  @ %=  METs  AD:  level =  METs  NS:   Heck=  METs  AE:  level =  METs Current Workloads  TM:  @ %=  METs  AD:  level =  METs  NS:   Heck=  METs  AE:  level =  METs Current Workloads  TM:  @ %=  METs  AD:  level =  METs  NS:   Heck=  METs  AE:  level =  METs     Frequency:    ICR: 3x/week  Home: 2-3x/wk Frequency:   ICR: 3x/week  Home: 3x/wk Frequency:  ICR: 3x/week  Home: 3-4x/wk Frequency:  ICR: 3x/week  Home: 3-4x/wk Frequency:  ICR: 3x/week  Home: 3-4x/wk Frequency:  Tish Joby will continue exercise at  5-7 days/week   Duration:   Total aerobic exercise = 30-45 min    5-8 min/mode Duration:  Total aerobic exercises = 23 min   8-10min/mode Duration:  Total aerobic exercises = 25-30 min     5-10 min/mode Duration:  Total aerobic exercises = ** min     **min/mode Duration:  Total aerobic exercises = ** min     **min/mode Duration:  Total erobic exercise =  60-90 min   Intensity:   MET Level = 2.1  RPE = 12-15 Intensity:  Max MET Level = 2.3  RPE = 12-15 Intensity:  Max MET Level = 2.3  RPE = 12-15 Intensity:  Max MET Level = **  RPE = 12-15 Intensity:  Max MET Level = **  RPE = 12-15 Intensity:  Max MET Level = ** RPE = 12-15   Progression: increase aerobic activity up to 15 min over next 4 weeks by increasing time 2-3 min/week. Progression:  Increase duration to 15 min per mode over the next 4 weeks as tolerated. Progression:  Increase duration to 15 min per mode over the next 4 weeks as tolerated.  Progression: Progression: Progression:  Increase time/intensity when RPE <13, and HR is in [facility] Strength Training Strength Training Strength Training   [x] Yes      [] No  Upper and Lower body strength training 2x/wk    Wt: 2#       Reps:  8-15    *Increase wt. after completing 15 reps with an RPE of <12/13. [] Yes      [x] No  Upper and Lower body strength training 2x/wk    Wt: **#       Reps:  8-15    *Increase wt. after completing 15 reps with an RPE of <12/13. [] Yes      [x] No  Upper and Lower body strength training 2x/wk    Does not attend [facility] [] Yes      [] No  Upper and Lower body strength training 2x/wk    Wt: **#       Reps:  8-15    *Increase wt. after completing 15 reps with an RPE of <12/13. [] Yes      [] No  Upper and Lower body strength training 2x/wk    Wt: **#       Reps:  8-15    *Increase wt. after completing 15 reps with an RPE of <12/13. Continue Strength Training at home   [] Exercise Log & Strength training handout given     Wt: **#       Reps:  8-15    *Increase wt. after completing 15 reps with an RPE of <12/13. Flexibility Flexibility Flexibility Flexibility Flexibility Flexibility   [x] Yes      [] No  25 min session of Core Strength & Flexibility 1x/per week  Attends Core Strength & Flexibility   [] Yes      [x] No Attends Core Strength & Flexibility   [] Yes      [x] No Attends Core Strength & Flexibility   [] Yes      [] No Attends Core Strength & Flexibility   [] Yes      [] No Continue Core Strength & Flexibility at home   Home Exercise  *Marylin Huntley planning to walk 3-4 days/week for 5-10 min on days not in rehab. Home Exercise  *Pipe Leonora verbalizes planning to walk 3-4 days/week for 10-20 min on days not in rehab. Home Exercise  *Pipe Leonora verbalizes planning to walk 3-4 days/week for 10-20 min on days not in rehab. Home Exercise  *Pipe Leonora verbalizes planning to ** ** days/week for ** min on days not in rehab. Home Exercise  *Pipe Leonora verbalizes planning to ** ** days/week for ** min on days not in rehab.  Home Exercise  *Marylin Huntley his/her plan to ** ** days/week for ** min @ **   *Education* *Education* *Education* *Education* *Education* *Education*   RPE Scale  [x] Yes      [] No  Exercise Safety  [x] Yes      [] No  Equipment Orientation  [x] Yes      [] No  S/S to Report  [x] Yes      [] No  Warm Up/Cool Down  [x] Yes      [] No  Home Exercise  [x] Yes      [] No     All Exercise Education Completed  [] Yes      [] No   Exercise Education Recommended    Workshops  [x] Benefits of Exercise  [x] Balance Training & Fall Prevention  [x] Heart Disease Risk Reduction  [x] Yoga & Heart Health Exercise Education Attended/Date     Exercise Education Attended/Date    Declined. Exercise Education Attended/Date Exercise Education Attended/Date All Sessions Completed? [] Yes  [] No   *Goals* *Goals* *Goals* *Goals* *Goals* *Goals*   Initial Exercise Goals Exercise Goals  Exercise Goals   Exercise Goals  Exercise Goals  Exercise Goals   Blanca Fernandez plans to:  [x] Attend exercise sessions 3x/wk  [x] initiate home exercise 2-3x/wk for 10-20 min  [x] Increase 6 min walk distance by 10%  [x] Attend Exercise workshops Blanca Fernandez plans to:  [x] Attend exercise sessions 3x/wk  [x] continue home exercise 2-3x/wk for 20-30 min  [x] Attend Exercise workshops Tereso's plans to:  [x] Attend exercise sessions 3x/wk  [x] continue home exercise 3-4x/wk for 30-45 min  [x] Determine plan of exercise following rehab  [] Attend Exercise workshops Tereso's plans to:  [x] Attend exercise sessions 3x/wk  [x] continue home exercise 3-4x/wk for 30-45 min  [x] Determine plan of exercise following rehab  [x] Attend Exercise workshops Tereso's plans to:  [x] Attend exercise sessions 3x/wk  [x] continue home exercise 3-4x/wk for 30-45 min  [x] Determine plan of exercise following rehab  [x] Attend Exercise workshops Blanca Fernandez achieved exercise goals?     []  Yes    [] No  If no, why?  **  [] Increased 6 min walk distance by 10%  [] Currently exercising 30-60 min/day, 5-7days/wk   [] Plans to continue exercise on own  [] Plans to join a local fitness center to continue exercise  [] Does not plan to continue to exercise after rehab   Mutually agreed upon goals:  Pipe Lea would like to have strength and engery to play with grand kids Progress towards mutually agreed upon goals:  Pipe Lea  is working on strength and endurance to improve his ability to play with the grand kids. Progress towards mutually agreed upon goals:  Pipe Lea is working on strength and endurance to improve his ability to play with the grand kids. Continue home walking.   Progress towards mutually agreed upon goals:  Pipe Lea ** Progress towards mutually agreed upon goals:  Pipe Lea  ** Progress towards mutually agreed upon goals:  Pipe Lea  **    *MET level required for above goal:  4-5 METs MET level Achieved: 2.2 METs MET level Achieved: 2.2 METs MET level Achieved:  **METs MET level Achieved:  **METs MET level Achieved:  **METs     Individual Cardiac Treatment Plan - Nutrition  NUTRITION  ASSESSMENT/PLAN NUTRITION  REASSESSMENT NUTRITION   REASSESSMENT NUTRITION   REASSESSMENT NUTRITION  DISCHARGE/FOLLOW-UP NUTRITION  DISCHARGE/FOLLOW-UP   Stages of Change Stages of Change Stages of Change Stages of Change Stages of Change Stages of Change   [] Pre Contemplation  [x] Contemplation  [] Preparation  [] Action  [] Maintenance  [] Relapse [x] Pre Contemplation  [] Contemplation  [] Preparation  [] Action  [] Maintenance  [] Relapse [x] Pre Contemplation  [] Contemplation  [] Preparation  [] Action  [] Maintenance  [] Relapse [] Pre Contemplation  [] Contemplation  [] Preparation  [] Action  [] Maintenance  [] Relapse [] Pre Contemplation  [] Contemplation  [] Preparation  [] Action  [] Maintenance  [] Relapse [] Pre Contemplation  [] Contemplation  [] Preparation  [] Action  [] Maintenance  [] Relapse   NUTRITION ASSESSMENT NUTRITION ASSESSMENT NUTRITION ASSESSMENT NUTRITION ASSESSMENT NUTRITION ASSESSMENT NUTRITION ASSESSMENT   Weight Management  Weight: 197        Height: 5'10\"   BMI: 28.3  Weight Management  Weight: 197                Weight Management  Weight: 200.8                  Weight Management  Weight: ** Weight Management  Weight: ** Weight Management  Weight: **                    BMI: **   Eating Plan  Current eating habits: try to follow diabetic diet Eating Plan  Changes: none reported Eating Plan  Changes: none reported Eating Plan  Changes: Eating Plan  Changes: Eating Plan Improvements:   Alcohol Use  [x] none          [] daily  [] weekly      [] special           Diet Assessment Tool:  RATE YOUR PLATE  *Given to patient to complete and return. Diet Assessment Tool:    Score: 51/72        Diet Assessment Tool: RATE YOUR PLATE  Score: **/28   NUTRITION PLAN NUTRITION PLAN NUTRITION PLAN NUTRITION PLAN NUTRITION PLAN NUTRITION PLAN   *Interventions* *Interventions* *Interventions* *Interventions* *Interventions* *Interventions*   Initial Survey given Goal Setting Discussion:   [x] Yes      [] No        Follow Up Survey Reviewed & Goals Updated:     Professional Referral  Please check if needed. [] Dietitian Consult   [] Wt. Management Referral  [] Other:  Professional Referral  Please check if needed. [] Dietitian Consult   [] Wt. Management Referral  [] Other: Professional Referral  Please check if needed. [] Dietitian Consult   [] Wt. Management Referral  [] Other: Professional Referral  Please check if needed. [] Dietitian Consult   [] Wt. Management Referral  [] Other: Professional Referral  Please check if needed. [] Dietitian Consult   [] Wt. Management Referral  [] Other: Professional Referral  Please check if needed. [] Dietitian Consult   [] Wt.  Management Referral  [] Other:   *Education* *Education* *Education* *Education* *Education* *Education*   Nutritional Education Recommended    [x] 1:1 Registered Dietitian    Workshops  [x] Label Reading   [x] Menu  [x] Targeting Nutrition Priorities  [x] Mindful Eating Nutritional Education Attended/Date    12/20/22  1:1 Registered Dietitian Nutritional Education Attended/Date    Declined. Nutritional Education Attended/Date Nutritional Education Attended/Date All Sessions Completed? [] Yes  [] No   Cooking School  Recommended     [x] Adding Flavor  [x] Fast & Healthy     Breakfasts  [x] Salads & Dressings  [x] Savory Soups  [x] Simple Sides & Sauces  [x] Appetizers &     Snacks  [x] Delicious Desserts  [x] Plant-Based Proteins  [x] Fast Evening Meals  [x] Weekend Breakfasts  [x] Efficiency Cooking  [x] One[employer]Pot [employer]  Sessions Completed    No Friday Cooking School  Sessions Completed    No [facility] School  Sessions Completed     [facility] [place] School  Sessions Completed Cooking School    # of sessions completed:  **   *Goals* *Goals* *Goals* *Goals* *Goals* *Goals*   Tereso's nutritional goals are as follows:  Complete and return diet survey Tereso's nutritional goals are as follows:  [x] Attend Nutrition Workshops  [x] Attend 1:1   [] Attend Cooking Classes  [] ** Tereso's nutritional goals are as follows:  [] Attend Nutrition Workshops  [] Attend 1:1   [] Attend Cooking Classes  [x] Complete and return diet survey   Tereso's nutritional goals are as follows:  [] Attend Nutrition Workshops  [] Attend 1:1   [] Attend Cooking Classes  [] ** Tereso's nutritional goals are as follows:  [] Attend Nutrition Workshops  [] Attend 1:1   [] Attend Cooking Classes  [] ** Nitin Fink achieved nutritional goals   [] Yes    [] No  If no, why?   Use knowledge gained to continue Pritikin eating plan at home       Individual Cardiac Treatment Plan - Psychosocial  PSYCHOSOCIAL  ASSESSMENT/PLAN PSYCHOSOCIAL  REASSESSMENT PSYCHOSOCIAL   REASSESSMENT PSYCHOSOCIAL   REASSESSMENT PSYCHOSOCIAL  DISCHARGE/FOLLOW-UP PSYCHOSOCIAL  DISCHARGE/FOLLOW-UP   Stages of Change Stages of Change Stages of Change Stages of Change Stages of Change Stages of Change   [x] Pre Contemplation  [] Contemplation  [] Preparation  [] Action  [] Maintenance  [] Relapse [] Pre Contemplation  [] Contemplation  [] Preparation  [x] Action  [] Maintenance  [] Relapse [] Pre Contemplation  [] Contemplation  [] Preparation  [x] Action  [] Maintenance  [] Relapse [] Pre Contemplation  [] Contemplation  [] Preparation  [] Action  [] Maintenance  [] Relapse [] Pre Contemplation  [] Contemplation  [] Preparation  [] Action  [] Maintenance  [] Relapse [] Pre Contemplation  [] Contemplation  [] Preparation  [] Action  [] Maintenance  [] Relapse   PSYCHOSOCIAL ASSESSMENT PSYCHOSOCIAL ASSESSMENT PSYCHOSOCIAL ASSESSMENT PSYCHOSOCIAL ASSESSMENT PSYCHOSOCIAL ASSESSMENT PSYCHOSOCIAL ASSESSMENT   Behavioral Outcomes Behavioral Outcomes Behavioral Outcomes Behavioral Outcomes Behavioral Outcomes Behavioral Outcomes   PHQ-9 score 1  Depression Severity  [x]Minimal  []Mild   []Moderate  []Moderately Severe  []Severe    PHQ-9 score **  Depression Severity  []Minimal  []Mild   []Moderate  []Moderately Severe []Severe PHQ-9 score **  Depression Severity  []Minimal  []Mild   []Moderate  []Moderately Severe []Severe   Does patient have Family Support? [x] Yes      [] No  No signs of marital/family distress        Within the Past Month:  *Have you wished you were dead or wished you could go to sleep and not wake up? [] Yes      [x] No  *Have you had any thoughts of killing yourself?   [] Yes      [x] No          Using a scale of 0-10, 0=none, 10=very:   Rate your depression: 0  Rate your anxiety:  0  Using a scale of 0-10, 0=none, 10=very:   Rate your depression: 0  Rate your anxiety:  0 Using a scale of 0-10, 0=none, 10=very:   Rate your depression: na  Rate your anxiety:  na Using a scale of 0-10, 0=none, 10=very:   Rate your depression: **  Rate your anxiety:  ** Using a scale of 0-10, 0=none, 10=very:   Rate your depression: **  Rate your anxiety:  ** Using a scale of 0-10, 0=none, 10=very:   Rate your depression: **  Rate your anxiety: **   Signs and Symptoms of Depression Present? [x] Yes       No  Poor eye contact Signs and Symptoms of Depression Present? [] Yes      [x] No   Signs and Symptoms of Depression Present? [] Yes      [x] No   Signs and Symptoms of Depression Present? [] Yes      [] No  If yes, please explain:  ** Signs and Symptoms of Depression Present? [] Yes      [] No  If yes, please explain:  ** Signs and Symptoms of Depression Present? [] Yes      [] No  If yes, please explain:  **   Signs and Symptoms of Anxiety Present? [] Yes      [x] No   Signs and Symptoms of Anxiety Present? [] Yes      [x] No   Signs and Symptoms of Anxiety Present? [] Yes      [x] No   Signs and Symptoms of Anxiety Present? [] Yes      [] No  If yes, please explain:  ** Signs and Symptoms of Anxiety Present? [] Yes      [] No  If yes, please explain:  ** Signs and Symptoms of Anxiety Present? [] Yes      [] No  If yes, please explain:  **   [x] Patient has poor eye contact   [] Flat affect present. [] Signs of anxiety, anger or hostility    [] Signs social isolation present.    []  Signs of alcohol or substance abuse        PSYCHOSOCIAL PLAN PSYCHOSOCIAL PLAN PSYCHOSOCIAL PLAN PSYCHOSOCIAL PLAN PSYCHOSOCIAL PLAN PSYCHOSOCIAL PLAN   *Interventions* *Interventions* *Interventions* *Interventions* *Interventions* *Interventions*   *Please check if needed  [] Psych Consult  [] Physician Referral  [x] Stress Management Skills *Please check if needed  [] Psych Consult  [] Physician Referral  [x] Stress Management Skills *Please check if needed  [] Psych Consult  [] Physician Referral  [x] Stress Management Skills *Please check if needed  [] Psych Consult  [] Physician Referral  [] Stress Management Skills *Please check if needed  [] Psych Consult  [] Physician Referral  [] Stress Management Skills *Please check if needed  [] Psych Consult  [] Physician Referral  [] Stress Management Skills   Is patient currently taking anti-depressant or anti-anxiety medications? [] Yes      [x] No   Change in anti-depressant or anti-anxiety medications? [] Yes      [x] No  If yes, please list medications:  ** Change in anti-depressant or anti-anxiety medications? [] Yes      [x] No  If yes, please list medications: na Change in anti-depressant or anti-anxiety medications? [] Yes      [] No  If yes, please list medications:  ** Change in anti-depressant or anti-anxiety medications? [] Yes      [] No  If yes, please list medications:  ** Change in anti-depressant or anti-anxiety medications? [] Yes      [] No  If yes, please list medications:  **   *Education* *Education* *Education* *Education* *Education* *Education*   Healthy Mind-Set Workshops Recommended  [x] Stress & Health  [x] Taking Charge of Stress  [x] New Thoughts, New Behaviors  [x] Managing Moods & Relationships Healthy Mind-Set Workshops Attended/Date    12/13/22 Taking Charge of Stress Healthy Mind-Set Workshops Attended/Date    1/3  Manage moods    Declined additional education. Healthy Mind-Set Workshops Attended/Date Healthy Mind-Set Workshops  Completed  [] Yes      [] No Healthy Mind-Set Workshops  Completed  [] Yes      [] No   *Goals* *Goals* *Goals* *Goals* *Goals* *Goals*   Tereso's psychosocial goals are as follows:  Complete HADS & [employer] [place], Quality of Life Index, Cardiac Version IV Tereso's psychosocial goals are as follows:  [x] Attend Healthy Mind-Set Workshops  [x] Reduce depression symptom severity by 1 level  [] ** Tereso's psychosocial goals are as follows:  [] Attend Healthy Mind-Set Workshops  [x] Reduce depression symptom severity by 1 level   Tereso's psychosocial goals are as follows:  [] Attend Healthy Mind-Set Workshops  [] Reduce depression symptom severity by 1 level  [] Kevin Leo achieved psychosocial goals?   [] Yes    [] No  If no, why?  **  [] Use methods learned to continue to reduce depression symptom severity by 1 level  [] ** Dulce Mcgregor achieved psychosocial goals?   [] Yes    [] No  If no, why?  **  [] Use methods learned to continue to reduce depression symptom severity by 1 level  [] **     Individual Cardiac Treatment Plan - Other:  Risk Factor/Education  RISK FACTOR  ASSESSMENT/PLAN RISK FACTOR  REASSESSMENT  RISK FACTOR  REASSESSMENT RISK FACTOR  REASSESSMENT RISK FACTOR   DISCHARGE/FOLLOW-UP RISK FACTOR   DISCHARGE/FOLLOW-UP   Stages of Change Stages of Change Stages of Change Stages of Change Stages of Change Stages of Change   [x] Pre Contemplation  [] Contemplation  [] Preparation  [] Action  [] Maintenance  [] Relapse [] Pre Contemplation  [x] Contemplation  [] Preparation  [] Action  [] Maintenance  [] Relapse [] Pre Contemplation  [x] Contemplation  [] Preparation  [] Action  [] Maintenance  [] Relapse [] Pre Contemplation  [] Contemplation  [] Preparation  [] Action  [] Maintenance  [] Relapse [] Pre Contemplation  [] Contemplation  [] Preparation  [] Action  [] Maintenance  [] Relapse [] Pre Contemplation  [] Contemplation  [] Preparation  [] Action  [] Maintenance  [] Relapse   RISK FACTOR/EDUCATION ASSESSMENT RISK FACTOR/EDUCATION ASSESSMENT RISK FACTOR/EDUCATION ASSESSMENT RISK FACTOR/EDUCATION ASSESSMENT RISK FACTOR /EDUCATION ASSESSMENT RISK FACTOR /EDUCATION ASSESSMENT   Hypertension  [x] Yes      [] No    Resting BP: 86/42  Peak Ex BP:132/62  Medication: metoprolol, amlodipine, entresto   Hypertension  Resting BP: 114/62  Peak Ex BP:112/58  Medication Changes:  [] Yes      [x] No Hypertension  Resting BP: 120/68  Peak Ex BP: 124/70  Medication Changes:  [] Yes      [x] No Hypertension  Resting BP: **  Peak Ex BP:**  Medication Changes:  [] Yes      [] No Hypertension  Resting BP: **  Peak Ex BP:**  Medication Changes:  [] Yes      [] No Hypertension  Resting BP: **  Peak Ex BP:**  Medication Changes:  [] Yes      [] No   Lipids  HLD/DLD  [x] Yes      [] No  TOTAL CHOL: 236  HDL:  46  LDL:  163  TRI  Medication: atorvastatin Lipids  Medication Changes:  [] Yes      [x] No     Lipids  Medication Changes:  [] Yes      [x] No     Lipids  Medication Changes:  [] Yes      [] No     Lipids    TOTAL CHOL: **  HDL:  **  LDL:  **  TRIG:  **  Medication Changes:  [] Yes      [] No Lipids    TOTAL CHOL: **  HDL:  **  LDL:  **  TRIG:  **  Medication Changes:  [] Yes      [] No   Diabetes  [x] Yes      [] No  FBS: 201           HbA1C: 8.7        Monitor BS @ home:   [x] Yes      [] No  Frequency: 2 x per week  Medication: empagliflozin- metformin, linagliptin Diabetes  Most Recent BS:  BS have been in range  [] Yes      [x] No  Medication Changes  [] Yes      [x] No   Diabetes  Most Recent BS: na  BS have been in range  [] Yes      [x] No  Medication Changes  [] Yes      [x] No     Diabetes  Most Recent BS:  BS have been in range  [] Yes      [] No  Medication Changes  [] Yes      [] No     Diabetes  Most Recent BS:  BS have been in range  [] Yes      [] No  Medication Changes  [] Yes      [] No Diabetes  Most Recent BS:  BS have been in range  [] Yes      [] No  Medication Changes  [] Yes      [] No       Tobacco Use  [x] Current  [] Former  [] Never    Years smoked: 40+:      Quit date set:  [x] Yes      [] No  Date: 1/1/23  # cigarettes smoked/day: 8  Smokeless Tobacco use:   [] Yes      [x] No   Tobacco Use  Change in smoking status   [] Yes      [x] No    Continues to smoke 8 cigs/day   Tobacco Use  Change in smoking status   [] Yes      [x] No    Pt still smoking.   Tobacco Use  Change in smoking status   [] Yes      [] No    Quit date: ** Tobacco Use  Change in smoking status   [] Yes      [] No    Quit date: ** Tobacco Use  Change in smoking status   [] Yes      [] No    Quit date: **             Learning Barriers  Please select one:  [] Speech  [] Literacy  [] Hearing  [] Cognitive  [] Vision  [x] Ready to Learn Learning Barriers Addressed:   [x] Yes      [] No   Learning Barriers Addressed:   [x] Yes      [] No   Learning Barriers Addressed:  [] Yes      [] No Learning Barriers Addressed:  [] Yes      [] No Learning Barriers Addressed:  [] Yes      [] No     RISK FACTOR/EDUCATION PLAN RISK FACTOR/EDUCATION PLAN RISK FACTOR/EDUCATION PLAN RISK FACTOR/EDUCATION PLAN RISK FACTOR/EDUCATION PLAN RISK FACTOR/EDUCATION PLAN   *Interventions* *Interventions* *Interventions* *Interventions* *Interventions* *Interventions*   Recommended Educational Videos    [x] Overview of The Pritikin Eating Plan  [x] Heart Disease Risk Reduction  [x] Move It! [x] Calorie Density  [x] Healthy Minds, Bodies, Hearts  [x] Label Reading  [x] Metabolic Syndrome & Belly   Or  [] How Our Thoughts Can Heal our Heart  [x] Dining Out-Part 1  [x] Biomechanical Limitations  [x] Facts on Fat  [x] Hypertension & Heart Disease  [x] Diseases of Our Times-Focusing on Diabetes  [x] Body Composition  [x] Nurtition Action Plan  [x] Exercise Action Plan   Completed Videos/Date      12/1/22  Overview of The Pritikin Eating Plan    12/8/22  Heart Disease Risk Reduction    12/15/22  Move It!    12/29/22  Calorie Density Completed Videos/Date    1/10/23  Healthy minds, bodies, hearts. Declined further education videos. Completed Videos/Date Completed Videos/Date Recommended Educational Videos Completed    [] Yes      [] No    **If not completed, Why? **           Smoking Cessation/Relaspe Prevention Intervention needed? [x] Yes      [] No  *Pt verbalizes and agrees to attend intervention Smoking Cessation/Relapse Prevention Scheduled? [x] Yes      [] No   Smoking Cessation/Relapse Prevention completed? [] Yes      [x] No      Pt declined. Smoking Cessation/Relapse Prevention completed? [] Yes      [] No  Date: ** Smoking Cessation/Relapse Prevention completed?    [] Yes      [] No  Date: ** Smoking Cessation Counseling attended  [] Yes      [] No  **If not completed, Why? **   Professional Referrals:  *Please check if needed  [] Diabetes Clinic  [] Lipid Clinic   [] Other:      Professional Referrals:  *Please check if needed  [] Diabetes Clinic  [] Lipid Clinic   [] Other:   Preventative Medication Preventative Medication Preventative Medication Preventative Medication Preventative Medication Preventative Medication   Aspirin  [x] Yes    [] No  Blood Thinner: Clopidogrel/Effient/Brillinta  [x] Yes    [] No  Beta Blocker  [x] Yes    [] No  Ace Inhibitor  [] Yes    [x] No  Statin/Lipid Lowering  [x] Yes    [] No Medication Changes? [] Yes    [x] No Medication Changes? [] Yes    [x] No Medication Changes? [] Yes    [] No Medication Changes? [] Yes    [] No Medication Changes? [] Yes    [] No   *Education* *Education* *Education* *Education* *Education* *Education*   Does Nia Valerio require any additional education? [] Yes    [] No   Does Nia Valerio require any additional education? [x] Yes    [] No Does Nia Valerio require any additional education? [] Yes    [x] No Does Nia Valerio require any additional education? [] Yes    [] No Does Nia Valerio require any additional education? [] Yes    [] No Does Nia Valerio require any additional education?   [] Yes    [] No   Additional Educational Videos    Exercise  [] Improve Performance    Medical  [] Aging Enhancing Your QoL  [] Biology of Weight Control  [] Decoding Lab Results  [] Diseases of Our Time - Overview  [] Sleep Disorders    Nutrition  [] Dining Out - Part 2  [] Fueling a Healthy Body  [] Menu Workshop  [] Planning Your Eating Strategy  [] Targeting Your Nutrition Priorities  [] Vitamins & Minerals    Healthy Mind-Set  [x] Smoking Cessation    Culinary  []Becoming a Pritikin    [] Cooking - Breakfast & Snacks  [] Cooking -Healhty Salads & Dressing  [] Cooking -Dinner & Sides  [] Cooking -Soups & Desserts    Overview  [] The Pritikin Solution Additional Educational Videos Completed Additional Educational Videos Completed Additional Educational Videos Completed Additional Educational Videos Completed Additional Educational Videos Completed    [] Yes    [] No   *Goals* *Goals* *Goals* *Goals* *Goals* *Goals*   Tereso's risk factor/education goals are as follows:    [x] Optimal BP <140/90  [x] Blood Sugar <120  [x] Attend recommended video education sessions  [x] Takes medications as prescribed 100% of the time   [] ** Tereso's risk factor/education goals are as follows:    [x] Optimal BP <140/90  [x] Blood Sugar <120  [x] Attend recommended video education sessions  [x] Takes medications as prescribed 100% of the time   [] ** Tereso's risk factor/education goals are as follows:    [x] Optimal BP <140/90  [x] Blood Sugar <120  [x] Attend recommended video education sessions  [x] Takes medications as prescribed 100% of the time   [] ** Tereso's risk factor/education goals are as follows:    [x] Optimal BP <140/90  [] Blood Sugar <120  [x] Attend recommended video education sessions  [x] Takes medications as prescribed 100% of the time   [] ** Tereso's risk factor/education goals are as follows:    [x] Optimal BP <140/90  [] Blood Sugar <120  [x] Attend recommended video education sessions  [x] Takes medications as prescribed 100% of the time   [] Fay Murdocksaul achieved risk factor goals?   [] Yes    [] No  If no, why?  **     Monitored telemetry has revealed NSR with occ PVC    [x] associated symptoms SOB Monitored telemetry has revealed NSR   Monitored telemetry has revealed NSR   Monitored telemetry has revealed **  [] documented arrhythmia at increasing workloads  [] associated symptoms ** Monitored telemetry has revealed **  [] documented arrhythmia at increasing workloads  [] associated symptoms ** Monitored telemetry has revealed **  [] documented arrhythmia at increasing workloads  [] associated symptoms **   Physician Response    [x] Cardiac rehab is reasonably and medically necessary for continuous cardiac monitoring surveillance  of patient's cardiac activity  [x] Initiate continuous telemetry monitoring and notify me with any concerns  [] Other   Physician Response    [x] Cardiac rehab is reasonably and medically necessary for continuous cardiac monitoring surveillance  of patient's cardiac activity  [x] Continue continuous telemetry monitoring and notify me with any concerns  [] Other     Physician Response    [x] Cardiac rehab is reasonably and medically necessary for continuous cardiac monitoring surveillance  of patient's cardiac activity  [x] Continue continuous telemetry monitoring and notify me with any concerns   [] Other     Physician Response    [x] Cardiac rehab is reasonably and medically necessary for continuous cardiac monitoring surveillance  of patient's cardiac activity  [x] Continue continuous telemetry monitoring and notify me with any concerns   [] Other     Physician Response    [x] Cardiac rehab is reasonably and medically necessary for continuous cardiac monitoring surveillance  of patient's cardiac activity  [x] Continue continuous telemetry monitoring and notify me with any concerns   [] Other      Cosigned by: David Rhodes MD at 12/5/2022  3:33 PM     Revision History  Date/Time User Provider Type Action   12/5/2022  3:33 PM David Rhodes MD Physician Cosign   12/1/2022  1:28 PM CARLOTA Castaneda Exercise Physiologist Sign      Cosigned by: David Rhodes MD at 12/29/2022 12:57 PM     Revision History  Date/Time User Provider Type Action   12/29/2022 12:57 PM David Rhodes MD Physician Cosign   12/29/2022 12:34 PM CARLOTA Castaneda Exercise Physiologist Sign

## 2023-01-31 ENCOUNTER — HOSPITAL ENCOUNTER (OUTPATIENT)
Dept: CARDIAC REHAB | Age: 63
Setting detail: THERAPIES SERIES
End: 2023-01-31
Payer: MEDICAID

## 2023-02-02 ENCOUNTER — HOSPITAL ENCOUNTER (OUTPATIENT)
Dept: CARDIAC REHAB | Age: 63
Setting detail: THERAPIES SERIES
Discharge: HOME | End: 2023-02-02
Payer: MEDICAID

## 2023-02-02 PROCEDURE — G0423 INTENS CARDIAC REHAB NO EXER: HCPCS

## 2023-02-02 PROCEDURE — G0422 INTENS CARDIAC REHAB W/EXERC: HCPCS

## 2023-02-02 NOTE — PROGRESS NOTES
Traci PADRON.:  1960    MRN:  [id number]    Date: 2023      Session Length:  40 min   Session # _______    EXERCISE WORKSHOP:  Understanding the Benefits of Exercise                        Todays class addressed ways to build on the foundation of patient's core exercise program to achieve greater health benefits. Discussion of the SAID (Specific Adaptation to Imposed Demands) and FITT (Frequency, Intensity, Time, and Type) principles, and how these principles impact fitness levels. Techniques on overcoming muscle soreness and safety concerns was also addressed. In addition, the importance of a varied weekly exercise routine in order to improve overall fitness level was also discussed. Readiness to change:    ( ) Pre-contemplative   (x) Contemplative - ambivalent about change    ( ) Action - ready to set action plan and implement   ( ) Maintenance - has made change and is trying, and or practicing different alternative behaviors     Additional Notes:      Hayley Umana was in the Workshop with the Exercise Physiologist for 40 minutes. The content was presented via Powerpoint, lecture, and patient participation based format. Motivational interviewing was utilized when needed, to promote change. Patient voiced understanding.     Electronically signed by CARLOTA Abad on 2023 at 9:42 AM

## 2023-02-07 ENCOUNTER — APPOINTMENT (OUTPATIENT)
Dept: CARDIAC REHAB | Age: 63
End: 2023-02-07
Payer: MEDICAID

## 2023-02-07 ENCOUNTER — HOSPITAL ENCOUNTER (OUTPATIENT)
Dept: CARDIAC REHAB | Age: 63
Setting detail: THERAPIES SERIES
End: 2023-02-07
Payer: MEDICAID

## 2023-02-09 ENCOUNTER — APPOINTMENT (OUTPATIENT)
Dept: CARDIAC REHAB | Age: 63
End: 2023-02-09
Payer: MEDICAID

## 2023-02-10 ENCOUNTER — NURSE ONLY (OUTPATIENT)
Dept: LAB | Age: 63
End: 2023-02-10

## 2023-02-10 DIAGNOSIS — E11.65 UNCONTROLLED TYPE 2 DIABETES MELLITUS WITH HYPERGLYCEMIA, WITHOUT LONG-TERM CURRENT USE OF INSULIN (HCC): ICD-10-CM

## 2023-02-10 LAB
DEPRECATED MEAN GLUCOSE BLD GHB EST-ACNC: 195 MG/DL (ref 70–126)
HBA1C MFR BLD HPLC: 8.5 % (ref 4.4–6.4)

## 2023-02-13 ENCOUNTER — OFFICE VISIT (OUTPATIENT)
Dept: FAMILY MEDICINE CLINIC | Age: 63
End: 2023-02-13
Payer: MEDICAID

## 2023-02-13 VITALS
DIASTOLIC BLOOD PRESSURE: 68 MMHG | BODY MASS INDEX: 29.39 KG/M2 | WEIGHT: 204.8 LBS | HEART RATE: 60 BPM | SYSTOLIC BLOOD PRESSURE: 122 MMHG | RESPIRATION RATE: 16 BRPM

## 2023-02-13 DIAGNOSIS — I25.10 CORONARY ARTERY DISEASE INVOLVING NATIVE CORONARY ARTERY OF NATIVE HEART WITHOUT ANGINA PECTORIS: ICD-10-CM

## 2023-02-13 DIAGNOSIS — J44.9 CHRONIC OBSTRUCTIVE PULMONARY DISEASE, UNSPECIFIED COPD TYPE (HCC): ICD-10-CM

## 2023-02-13 DIAGNOSIS — Z95.1 S/P CABG X 2: ICD-10-CM

## 2023-02-13 DIAGNOSIS — E78.00 PURE HYPERCHOLESTEROLEMIA: ICD-10-CM

## 2023-02-13 DIAGNOSIS — I47.20 VENTRICULAR TACHYARRHYTHMIA: ICD-10-CM

## 2023-02-13 DIAGNOSIS — R80.9 MICROALBUMINURIA: ICD-10-CM

## 2023-02-13 DIAGNOSIS — Z72.0 TOBACCO ABUSE: ICD-10-CM

## 2023-02-13 DIAGNOSIS — I10 ESSENTIAL HYPERTENSION: ICD-10-CM

## 2023-02-13 DIAGNOSIS — E11.65 UNCONTROLLED TYPE 2 DIABETES MELLITUS WITH HYPERGLYCEMIA, WITHOUT LONG-TERM CURRENT USE OF INSULIN (HCC): Primary | ICD-10-CM

## 2023-02-13 PROCEDURE — 3078F DIAST BP <80 MM HG: CPT | Performed by: FAMILY MEDICINE

## 2023-02-13 PROCEDURE — 4004F PT TOBACCO SCREEN RCVD TLK: CPT | Performed by: FAMILY MEDICINE

## 2023-02-13 PROCEDURE — G8427 DOCREV CUR MEDS BY ELIG CLIN: HCPCS | Performed by: FAMILY MEDICINE

## 2023-02-13 PROCEDURE — 2022F DILAT RTA XM EVC RTNOPTHY: CPT | Performed by: FAMILY MEDICINE

## 2023-02-13 PROCEDURE — 99214 OFFICE O/P EST MOD 30 MIN: CPT | Performed by: FAMILY MEDICINE

## 2023-02-13 PROCEDURE — 3023F SPIROM DOC REV: CPT | Performed by: FAMILY MEDICINE

## 2023-02-13 PROCEDURE — G8419 CALC BMI OUT NRM PARAM NOF/U: HCPCS | Performed by: FAMILY MEDICINE

## 2023-02-13 PROCEDURE — 3052F HG A1C>EQUAL 8.0%<EQUAL 9.0%: CPT | Performed by: FAMILY MEDICINE

## 2023-02-13 PROCEDURE — G8484 FLU IMMUNIZE NO ADMIN: HCPCS | Performed by: FAMILY MEDICINE

## 2023-02-13 PROCEDURE — 3074F SYST BP LT 130 MM HG: CPT | Performed by: FAMILY MEDICINE

## 2023-02-13 PROCEDURE — 3017F COLORECTAL CA SCREEN DOC REV: CPT | Performed by: FAMILY MEDICINE

## 2023-02-13 RX ORDER — ALBUTEROL SULFATE 2.5 MG/3ML
2.5 SOLUTION RESPIRATORY (INHALATION) EVERY 6 HOURS PRN
Qty: 120 EACH | Refills: 2 | Status: SHIPPED | OUTPATIENT
Start: 2023-02-13

## 2023-02-13 RX ORDER — LOSARTAN POTASSIUM 50 MG/1
50 TABLET ORAL DAILY
COMMUNITY

## 2023-02-13 SDOH — ECONOMIC STABILITY: INCOME INSECURITY: HOW HARD IS IT FOR YOU TO PAY FOR THE VERY BASICS LIKE FOOD, HOUSING, MEDICAL CARE, AND HEATING?: NOT HARD AT ALL

## 2023-02-13 SDOH — ECONOMIC STABILITY: FOOD INSECURITY: WITHIN THE PAST 12 MONTHS, THE FOOD YOU BOUGHT JUST DIDN'T LAST AND YOU DIDN'T HAVE MONEY TO GET MORE.: NEVER TRUE

## 2023-02-13 SDOH — ECONOMIC STABILITY: FOOD INSECURITY: WITHIN THE PAST 12 MONTHS, YOU WORRIED THAT YOUR FOOD WOULD RUN OUT BEFORE YOU GOT MONEY TO BUY MORE.: NEVER TRUE

## 2023-02-13 ASSESSMENT — ENCOUNTER SYMPTOMS
GASTROINTESTINAL NEGATIVE: 1
RESPIRATORY NEGATIVE: 1

## 2023-02-13 ASSESSMENT — PATIENT HEALTH QUESTIONNAIRE - PHQ9
SUM OF ALL RESPONSES TO PHQ QUESTIONS 1-9: 0
SUM OF ALL RESPONSES TO PHQ QUESTIONS 1-9: 0
2. FEELING DOWN, DEPRESSED OR HOPELESS: 0
SUM OF ALL RESPONSES TO PHQ9 QUESTIONS 1 & 2: 0
1. LITTLE INTEREST OR PLEASURE IN DOING THINGS: 0
SUM OF ALL RESPONSES TO PHQ QUESTIONS 1-9: 0
SUM OF ALL RESPONSES TO PHQ QUESTIONS 1-9: 0

## 2023-02-13 NOTE — PATIENT INSTRUCTIONS
You may receive a survey about your visit with us today. The feedback from our patients helps us identify what is working well and where the service to all patients can be enhanced. Thank you! Tobacco Cessation Programs     Telephonic behavior modification  1-800-QUIT-NOW (319-3679)  Counseling service for those who are ready to quit using tobacco.    Available for uninsured [place] residents, Medicaid recipients, pregnant women, or patients whose health plans or employers are members of the [address] [place] [address] behavior modification  http://[place]. [employer]uit[employer]. org  Online support program to help patients through each step of the quitting process. Available 24 hours a day 7 days a week. Provides up to date researched based tool, step-by-step guides, and motivational messages. Online behavior modification  www.lungusa.org/stop-smoking/how-to-quit  HelpLine: 1-800-LUNG-[place] (823-4267)  Email questions to:  Leatha@[email or web address]. [employer]   Website offers resources to help tobacco users figure out their reasons for quitting and then take the big step of quitting for good. Hypnosis  Location: [address], [facility], [place]  Contact: Janice Galindo, PhD at [phone]  Hypnosis for tobacco cessation  Cost $225 for the initial session and $175 for each session afterwards. Most patients require 6-8 sessions. There is the option to submit through the patients insurance. Hypnosis and behavior modification  Location: [place] [address],  [address] [facility], [place]  Contact: Jake Whalen, PhD at [phone]  Counseling and hypnosis for nicotine addition  Cost: For uninsured patients:  Please call above phone number  Cost for insured patients depends on patients insurance plan.     Behavior modification  Location: [facility], [address] [facility], [place] [address]: [phone]  Services include four one-on-one appointments between the patient and a respiratory therapist.  The four appointments span over three weeks. The respiratory therapist schedules one of the appointments to occur 48 hours after the patients quit date. Cost $100 total for the four sessions.   Tobacco cessation products are not included in the cost and are not provided by [facility].

## 2023-02-13 NOTE — PROGRESS NOTES
Miley Patrick (:  1960) is a 58 y.o. male,Established patient, here for evaluation of the following chief complaint(s):  3 Month Follow-Up, Diabetes, and Results        Subjective   SUBJECTIVE/OBJECTIVE:  HPI:    Chief Complaint   Patient presents with    3 Month Follow-Up    Diabetes    Results     3 month eval.    Sugars improved slightly but still not to goal.  Never started the Tradjenta, not sure why. Lab Results   Component Value Date    LABA1C 8.5 (H) 02/10/2023    LABA1C 8.7 (H) 10/29/2022    LABA1C 9.5 (H) 2021     Lab Results   Component Value Date    GLUF 142 (H) 2017    LABMICR 17.80 2021    LDLCALC 163 2021    CREATININE 0.8 10/31/2022     BP Readings from Last 3 Encounters:   23 122/68   22 108/65   22 132/80     Weight is up. He is down to 1/2 ppd from 2 ppd smoking and feels this is related.   Wt Readings from Last 3 Encounters:   23 204 lb 12.8 oz (92.9 kg)   22 197 lb (89.4 kg)   22 197 lb (89.4 kg)       Patient Active Problem List   Diagnosis    COPD (chronic obstructive pulmonary disease) (HCC)    CAD (coronary artery disease)    Post PTCA with MI    Hyperlipidemia    HTN (hypertension)    Tobacco abuse    Polyp of colon    Chest pain syndrome    Abnormal stress ECG with treadmill    Diabetes mellitus type II, uncontrolled    S/P CABG x 2    Uncontrolled type 2 diabetes mellitus, without long-term current use of insulin    Mild left ventricular systolic dysfunction    Dizzinesses    Angina pectoris, crescendo ([address]    Hypotension    Ventricular tachycardia     Past Surgical History:   Procedure Laterality Date    APPENDECTOMY      CARDIAC CATHETERIZATION  11    CARDIAC CATHETERIZATION  14    [facility]    COLONOSCOPY      CORONARY ANGIOPLASTY WITH STENT PLACEMENT  3/12/10    [facility]    CORONARY ARTERY BYPASS GRAFT      [facility]    VASCULAR SURGERY       Social History     Tobacco Use    Smoking status: Every Day Packs/day: 0.25     Years: 32.00     Pack years: 8.00     Types: Cigarettes    Smokeless tobacco: Never   Vaping Use    Vaping Use: Never used   Substance Use Topics    Alcohol use: No     Alcohol/week: 0.0 standard drinks    Drug use: No         Review of Systems   Constitutional: Negative. HENT: Negative. Respiratory: Negative. Cardiovascular: Negative. Gastrointestinal: Negative. Musculoskeletal: Negative. All other systems reviewed and are negative. Objective   Physical Exam  Vitals and nursing note reviewed. Constitutional:       General: He is not in acute distress. Appearance: Normal appearance. He is well-developed. HENT:      Head: Normocephalic and atraumatic. Right Ear: Tympanic membrane normal.      Left Ear: Tympanic membrane normal.   Eyes:      Conjunctiva/sclera: Conjunctivae normal.   Cardiovascular:      Rate and Rhythm: Normal rate and regular rhythm. Heart sounds: Normal heart sounds. No murmur heard. Pulmonary:      Effort: Pulmonary effort is normal.      Breath sounds: Normal breath sounds. No wheezing, rhonchi or rales. Abdominal:      General: There is no distension. Musculoskeletal:      Cervical back: Neck supple. Skin:     General: Skin is warm and dry. Findings: No rash (on exposed surfaces). Neurological:      General: No focal deficit present. Mental Status: He is alert. Psychiatric:         Attention and Perception: Attention normal.         Mood and Affect: Mood normal.         Speech: Speech normal.         Behavior: Behavior normal. Behavior is cooperative. Thought Content: Thought content normal.         Judgment: Judgment normal.             ASSESSMENT/PLAN:  1. Uncontrolled type 2 diabetes mellitus with hyperglycemia, without long-term current use of insulin (HCC)  -     Hemoglobin A1C; Future  2. Coronary artery disease involving native coronary artery of native heart without angina pectoris  3.  Chronic obstructive pulmonary disease, unspecified COPD type ([facility] [address]  4. Essential hypertension  5. Ventricular tachyarrhythmia  6. Pure hypercholesterolemia  7. Tobacco abuse  8. S/P CABG x 2  9. Microalbuminuria    -  Chronic medical problems stable  -  Continue current medications, start Tradjenta  -  Follow up with specialists as scheduled  -  Recheck A1C 3 mos    Return in about 3 months (around 5/13/2023) for DM2. An electronic signature was used to authenticate this note.     --Sid Núñez, DO

## 2023-02-14 ENCOUNTER — HOSPITAL ENCOUNTER (OUTPATIENT)
Dept: CARDIAC REHAB | Age: 63
Setting detail: THERAPIES SERIES
Discharge: HOME | End: 2023-02-14
Payer: MEDICAID

## 2023-02-14 PROCEDURE — G0423 INTENS CARDIAC REHAB NO EXER: HCPCS

## 2023-02-14 PROCEDURE — G0422 INTENS CARDIAC REHAB W/EXERC: HCPCS

## 2023-02-14 NOTE — PROGRESS NOTES
Video Education Report - ICR/CR  Name:  Misty Forrester     Date:  2/14/2023  MRN: [id number]     Session #:    Session Length: 40 min    Core Videos        []Heart Disease Risk Reduction       []Overview of Pritikin Eating Plan      []Move it          []Calorie Density         []Healthy Minds, Bodies, Hearts        []Label Reading - Part 1       []Metabolic Syndrome and Belly Fat        []How Our Thoughts Can Heal Our Hearts   []Dining Out - Part 1      []Biomechanical Limitations  []Facts on Fat        []Hypertension & Heart Disease    []Diseases of Our Time - Focusing on Diabetes   []Body Composition  []Nutrition Action Plan    [x]Exercise Action Plan      Comments:  Video completed, group discussion

## 2023-02-16 ENCOUNTER — HOSPITAL ENCOUNTER (OUTPATIENT)
Dept: CARDIAC REHAB | Age: 63
Setting detail: THERAPIES SERIES
Discharge: HOME | End: 2023-02-16
Payer: MEDICAID

## 2023-02-16 PROCEDURE — G0422 INTENS CARDIAC REHAB W/EXERC: HCPCS

## 2023-02-16 PROCEDURE — G0423 INTENS CARDIAC REHAB NO EXER: HCPCS

## 2023-02-16 NOTE — PROGRESS NOTES
Video Education Report - ICR/CR  Name:  Theron Ramiresr     Date:  2/16/2023  MRN: [id number]     Session #:    Session Length: 40 min    Core Videos        []Heart Disease Risk Reduction       []Overview of Pritikin Eating Plan      []Move it          []Calorie Density         []Healthy Minds, Bodies, Hearts        []Label Reading - Part 1       [x]Metabolic Syndrome and Belly Fat        []How Our Thoughts Can Heal Our Hearts   []Dining Out - Part 1      []Biomechanical Limitations  []Facts on Fat        []Hypertension & Heart Disease    []Diseases of Our Time - Focusing on Diabetes   []Body Composition  []Nutrition Action Plan    []Exercise Action Plan      Comments:  Video completed

## 2023-02-21 ENCOUNTER — HOSPITAL ENCOUNTER (OUTPATIENT)
Dept: CARDIAC REHAB | Age: 63
Setting detail: THERAPIES SERIES
Discharge: HOME | End: 2023-02-21
Payer: MEDICAID

## 2023-02-21 PROCEDURE — G0422 INTENS CARDIAC REHAB W/EXERC: HCPCS

## 2023-02-21 NOTE — PROGRESS NOTES
Video Education Report - ICR/CR  Name:  Julian Ochoa     Date:  2/21/2023  MRN: [id number]     Session #:  10  Session Length: 40 min    Core Videos        []Heart Disease Risk Reduction       []Overview of Pritikin Eating Plan      []Move it          []Calorie Density         []Healthy Minds, Bodies, Hearts        []Label Reading - Part 1       []Metabolic Syndrome and Belly Fat        []How Our Thoughts Can Heal Our Hearts   []Dining Out - Part 1      []Biomechanical Limitations  []Facts on Fat        []Hypertension & Heart Disease    []Diseases of Our Time - Focusing on Diabetes   []Body Composition  []Nutrition Action Plan    []Exercise Action Plan    Exercise      Healthy Mind-Set  []Improving Performance    [x]Smoking Cessation    []Introduction to Yoga      []Vitamins and Minerals    Comments:  Video completed, group discussion

## 2023-02-23 ENCOUNTER — HOSPITAL ENCOUNTER (OUTPATIENT)
Dept: CARDIAC REHAB | Age: 63
Setting detail: THERAPIES SERIES
Discharge: HOME | End: 2023-02-23
Payer: MEDICAID

## 2023-02-23 PROCEDURE — G0423 INTENS CARDIAC REHAB NO EXER: HCPCS

## 2023-02-23 PROCEDURE — G0422 INTENS CARDIAC REHAB W/EXERC: HCPCS

## 2023-02-23 NOTE — PROGRESS NOTES
Video Education Report - ICR/CR  Name:  Gary Amaya     Date:  2/23/2023  MRN: [id number]     Session #:    Session Length: 40 min    Core Videos        []Heart Disease Risk Reduction       []Overview of Pritikin Eating Plan      []Move it          []Calorie Density         []Healthy Minds, Bodies, Hearts        []Label Reading - Part 1       []Metabolic Syndrome and Belly Fat        []How Our Thoughts Can Heal Our Hearts   []Dining Out - Part 1      []Biomechanical Limitations  []Facts on Fat        []Hypertension & Heart Disease    []Diseases of Our Time - Focusing on Diabetes   []Body Composition  []Nutrition Action Plan    []Exercise Action Plan    Exercise              [x]Introduction to Yoga      Comments:  Video completed, handouts given

## 2023-02-23 NOTE — PLAN OF CARE
[address] [place] [address] Facility-Based Program  Individualized Cardiac Treatment Plan    Patient Name:  Faye Flaherty  :  1960  Age:  58 y.o. MRN:  [id number]  Diagnosis: PCI  Date of Event: 10/20/22   Physician:  Barbara Quiros  Next Office Visit:    Date Entered Program: 22  Risk Stratifications: [] Low [] Intermediate [x] High  Allergies: No Known Allergies    COVID -19 Screen  Do you have any of the following symptoms:  [] Fever [] Cough [] SOB [] Muscle/Body Ache [] Loss of taste/smell [x] None  []  Have you traveled outside of the [place]? [] Yes      x No    Have you been around anyone who has tested Positive for COVID 19?  [] Yes      [x] No    The following Education has been completed with patient. xWait in the lobby until we call you back to rehab. [x][] Bring your own bottle of water with you.       Individual Cardiac Treatment Plan -EXERCISE  INITIAL 30 DAY 60 DAY 90  DAY FINAL DAY   EXERCISE  ASSESSMENT/PLAN EXERCISE  REASSESSMENT EXERCISE   REASSESSMENT EXERCISE   REASSESSMENT EXERCISE   REASSESSMENT EXERCISE  DISCHARGE/FOLLOW-UP   Date: 22 Date: 22 Date: 23 Date: 23 Date: Date:   Session #1  First Exercise Session:  ** Session # 12 Session # 24 Session # 30 Session # ** Session # **  Last Exercise Session:  **   Stages of Change Stages of Change Stages of Change Stages of Change Stages of Change Stages of Change   [x] Pre Contemplation  [] Contemplation  [] Preparation  [] Action  [] Maintenance  [] Relapse [] Pre Contemplation  [] Contemplation  [] Preparation  [x] Action  [] Maintenance  [] Relapse [] Pre Contemplation  [] Contemplation  [] Preparation  [x] Action  [] Maintenance  [] Relapse [] Pre Contemplation  [x] Contemplation  [] Preparation  [] Action  [] Maintenance  [] Relapse [] Pre Contemplation  [] Contemplation  [] Preparation  [] Action  [] Maintenance  [] Relapse [] Pre Contemplation  [] Contemplation  [] Preparation  [] Action  [] Maintenance  [] Relapse   EXERCISE ASSESSMENT EXERCISE ASSESSMENT EXERCISE ASSESSMENT EXERCISE ASSESSMENT EXERCISE ASSESSMENT EXERCISE ASSESSMENT   6 Min Walk Test  Distance walked:   0.14 miles  739 ft.  2.1 METs  Max HR:108 BPM      RPE:  12    THR:  116-130  Rhythm:  NSR with occ PVC  Pt not here for reassessment. Pt has lifevest.    6 Min Walk Test  Distance walked:   ** miles  ** ft  ** METs  Max HR:** BPM      RPE:  **  %Change ft= **    Rhythm:  **   DASI: 4.7 METs DASI: 5.1  METs DASI: na DASI: 4.4METs DASI: ** METs DASI: ** METs   Return to Work  [employer] on returning to work? [] Yes              [] No   [x] Disabled     [x] Retired        Return to work: Has Wallace Melissa returned to work? Retired. Return to work: Has Wallace Melissa returned to work? Pt is retired Return to work: Has Wallace Melissa returned to work? [] Yes    [] No    Return to work date set? [] Yes, **    [] No    Wallace Melissa is doing ** at work. Return to work: Has Wallace Melissa returned to work? [] Yes    [] No    Return to work? [] Yes, **    [] No    *Required MET Level achieved for job duties?    [] Yes    [] No   Orthopedic Limitations/  [x] Yes    [] No  If yes please list:  foot pain     Orthopedic Limitations  *If patient has orthopedic issue:   Actions/  accomodations needed to make Wallace Melissa successful : no TM Orthopedic Limitations    na   Orthopedic Limitations  NA Orthopedic Limitations Orthopedic Limitations     Fall Risk    [x]Assessed as a fall risk  [] Not a fall risk      [x] Balance Issues       [] Hever Art        [] Cane       [] Wheelchair  Actions needed:  encouraged to use a cane if unbalanced    [x] Safety issues reviewed      Fall Risk  *If patient is a fall risk, action needed to accommodate: Pt does not use the TM Fall Risk    na Fall Risk  NA Fall Risk Fall Risk   Home Exercise  [] Yes    [x] No   Home Exercise  [x] Yes    [] No  Type: walking  Frequency: 4x per week  Duration: 5 min Home Exercise  [x] Yes    [] No  Type: walking  Frequency:4 x per week  Duration: 5 min Home Exercise  [x] Yes    [] No  Type: walking  Frequency: 5d/wk  Duration: 5min Home Exercise  [] Yes    [] No  Type: **  Frequency: **  Duration: ** Home Exercise  [] Yes    [] No  Type: **  Frequency: **  Duration: **   Angina with Activity? [x] Yes    [] No  Angina Management: rest, nitro Angina with Activity? [] Yes    [x] No  Angina Management: na Angina with Activity? [] Yes    [x] No  Angina Management: na Angina with Activity? [] Yes    [x] No   Angina with Activity? [] Yes    [] No  Angina Management: ** Angina with Activity?   [] Yes    [] No  Angina Management: **   EXERCISE PLAN EXERCISE PLAN EXERCISE PLAN EXERCISE PLAN EXERCISE PLAN EXERCISE PLAN   *Interventions* *Interventions* *Interventions* *Interventions* *Interventions* *Interventions*   Exercise Prescription  (per physician & CR staff) Exercise Prescription  (per physician & CR staff) Exercise Prescription  (per physician & CR staff) Exercise Prescription  (per physician & CR staff) Exercise Prescription  (per physician & CR staff) Exercise Prescription  (per physician & CR staff)   Cardiovascular Cardiovascular Cardiovascular Cardiovascular Cardiovascular Cardiovascular   Mode:    [x] Treadmill (TM)  [x] Schwinn Airdyne (AD)  [x] Arms Ergometer (AE)  [] NuStep  [] Elliptical (E) MODE:    [] Treadmill (TM)  [x] Schwinn Airdyne (AD)  [x] Arms Ergometer (AE)  [x] NuStep  [] Elliptical (E) MODE:    [x] Treadmill (TM)  [x] Schwinn Airdyne (AD)  [x] Arms Ergometer (AE)  [x] NuStep  [] Elliptical (E) MODE:    [x] Treadmill (TM)  [x] Schwinn Airdyne (AD)  [x] Arms Ergometer (AE)   MODE:    [] Treadmill (TM)  [] Schwinn Airdyne (AD)  [] Arms Ergometer (AE)  [] NuStep  [] Elliptical (E) MODE:    [] Treadmill (TM)  [] Schwinn Airdyne (AD)  [] Arms Ergometer (AE)  [] NuStep  [] Elliptical (E)   Initial Workloads  TM: [email or web address] 2.1 METs  AD: 0.7 level = 2.1 METs  NS: 36  Heck= 2.1 METs  AE: 0.4 level = 1.6 METs Current Workloads    AD:  27 Heck =  2.3METs  NS:   42Watts= 2.2 METs  AE:  24 Heck =  METs Current Workloads  TM:  1.4@ 0%= 2.1 METs  AD: 35 heck = 2.3 METs  NS: 40 Heck= 2.2 METs  AE: 24 heck Current Workloads  TM: 1.4 @ 0%= 2.1 METs  AD: 0.8 level = 2.3 METs  AE:  0.5 level = 1.8 METs Current Workloads  TM:  @ %=  METs  AD:  level =  METs  NS:   Heck=  METs  AE:  level =  METs Current Workloads  TM:  @ %=  METs  AD:  level =  METs  NS:   Heck=  METs  AE:  level =  METs     Frequency:    ICR: 3x/week  Home: 2-3x/wk Frequency:   ICR: 3x/week  Home: 3x/wk Frequency:  ICR: 3x/week  Home: 3-4x/wk Frequency:  ICR: 3x/week  Home: 3-4x/wk Frequency:  ICR: 3x/week  Home: 3-4x/wk Frequency:  Francisco Her will continue exercise at  5-7 days/week   Duration:   Total aerobic exercise = 30-45 min    5-8 min/mode Duration:  Total aerobic exercises = 23 min   8-10min/mode Duration:  Total aerobic exercises = 25-30 min     5-10 min/mode Duration:  Total aerobic exercises = 15 min     15min/mode Duration:  Total aerobic exercises = ** min     **min/mode Duration:  Total erobic exercise =  60-90 min   Intensity:   MET Level = 2.1  RPE = 12-15 Intensity:  Max MET Level = 2.3  RPE = 12-15 Intensity:  Max MET Level = 2.3  RPE = 12-15 Intensity:  Max MET Level = 2.3  RPE = 12-15 Intensity:  Max MET Level = **  RPE = 12-15 Intensity:  Max MET Level = ** RPE = 12-15   Progression: increase aerobic activity up to 15 min over next 4 weeks by increasing time 2-3 min/week. Progression:  Increase duration to 15 min per mode over the next 4 weeks as tolerated. Progression:  Increase duration to 15 min per mode over the next 4 weeks as tolerated. Progression:  Increase aerobic intensity as tolerated by the pt. Progress slowly.  Progression: Progression:  Increase time/intensity when RPE <13, and HR is in [facility] Strength Training Strength Training   [x] Yes      [] No  Upper and Lower body strength training 2x/wk    Wt: 2#       Reps:  8-15    *Increase wt. after completing 15 reps with an RPE of <12/13. [] Yes      [x] No  Upper and Lower body strength training 2x/wk    Wt: **#       Reps:  8-15    *Increase wt. after completing 15 reps with an RPE of <12/13. [] Yes      [x] No  Upper and Lower body strength training 2x/wk    Does not attend [facility] [] Yes      [x] No  Pt is not attending on fridays to participate in strength training [] Yes      [] No  Upper and Lower body strength training 2x/wk    Wt: **#       Reps:  8-15    *Increase wt. after completing 15 reps with an RPE of <12/13. Continue Strength Training at home   [] Exercise Log & Strength training handout given     Wt: **#       Reps:  8-15    *Increase wt. after completing 15 reps with an RPE of <12/13. Flexibility Flexibility Flexibility Flexibility Flexibility Flexibility   [x] Yes      [] No  25 min session of Core Strength & Flexibility 1x/per week  Attends Core Strength & Flexibility   [] Yes      [x] No Attends Core Strength & Flexibility   [] Yes      [x] No Attends Core Strength & Flexibility   [] Yes      [x] No Attends Core Strength & Flexibility   [] Yes      [] No Continue Core Strength & Flexibility at home   Home Exercise  *Holli Litten planning to walk 3-4 days/week for 5-10 min on days not in rehab. Home Exercise  *Georginae July verbalizes planning to walk 3-4 days/week for 10-20 min on days not in rehab. Home Exercise  *Trev July verbalizes planning to walk 3-4 days/week for 10-20 min on days not in rehab. Home Exercise  *Trev July verbalizes planning to 5 days/week for 5 min on days not in rehab. Home Exercise  *Trev July verbalizes planning to ** ** days/week for ** min on days not in rehab.  Home Exercise  *Holli Litten his/her plan to ** ** days/week for ** min @ **   *Education* *Education* *Education* *Education* *Education* *Education* RPE Scale  [x] Yes      [] No  Exercise Safety  [x] Yes      [] No  Equipment Orientation  [x] Yes      [] No  S/S to Report  [x] Yes      [] No  Warm Up/Cool Down  [x] Yes      [] No  Home Exercise  [x] Yes      [] No     All Exercise Education Completed  [] Yes      [] No   Exercise Education Recommended    Workshops  [x] Benefits of Exercise  [x] Balance Training & Fall Prevention  [x] Heart Disease Risk Reduction  [x] Yoga & Heart Health Exercise Education Attended/Date     Exercise Education Attended/Date    Declined. Exercise Education Attended/Date    Exercise basics 2/2/23    Yoga video 2/23/23 Exercise Education Attended/Date All Sessions Completed? [] Yes  [] No   *Goals* *Goals* *Goals* *Goals* *Goals* *Goals*   Initial Exercise Goals Exercise Goals  Exercise Goals   Exercise Goals  Exercise Goals  Exercise Goals   Joseph Wang plans to:  [x] Attend exercise sessions 3x/wk  [x] initiate home exercise 2-3x/wk for 10-20 min  [x] Increase 6 min walk distance by 10%  [x] Attend Exercise workshops Joseph Wang plans to:  [x] Attend exercise sessions 3x/wk  [x] continue home exercise 2-3x/wk for 20-30 min  [x] Attend Exercise workshops Tereso's plans to:  [x] Attend exercise sessions 3x/wk  [x] continue home exercise 3-4x/wk for 30-45 min  [x] Determine plan of exercise following rehab  [] Attend Exercise workshops Tereso's plans to:  [x] Attend exercise sessions 3x/wk  [x] continue home exercise 3-4x/wk for 30-45 min  [x] Determine plan of exercise following rehab  [x] Attend Exercise workshops Tereso's plans to:  [x] Attend exercise sessions 3x/wk  [x] continue home exercise 3-4x/wk for 30-45 min  [x] Determine plan of exercise following rehab  [x] Attend Exercise workshops Joseph Wang achieved exercise goals?     []  Yes    [] No  If no, why?  **  [] Increased 6 min walk distance by 10%  [] Currently exercising 30-60 min/day, 5-7days/wk   [] Plans to continue exercise on own  [] Plans to join a local fitness center to continue exercise  [] Does not plan to continue to exercise after rehab   Mutually agreed upon goals:  Fadumo Hoffman would like to have strength and engery to play with grand kids Progress towards mutually agreed upon goals:  Fadumo Hoffman  is working on strength and endurance to improve his ability to play with the grand kids. Progress towards mutually agreed upon goals:  Fadumo Hoffman is working on strength and endurance to improve his ability to play with the grand kids. Continue home walking. Progress towards mutually agreed upon goals:  Fadumo Hoffman is not progressing. He is not exercising 30-0 min a day outside of rehab, he is still smoking, he has not changed his diet. He does not feel like he is building strength and endurance. Progress towards mutually agreed upon goals:  Fadumo Hoffman  ** Progress towards mutually agreed upon goals:  Fadumo Hoffman  **    *MET level required for above goal:  4-5 METs MET level Achieved: 2.2 METs MET level Achieved: 2.2 METs MET level Achieved:  2. 3METs MET level Achieved:  **METs MET level Achieved:  **METs     Individual Cardiac Treatment Plan - Nutrition  NUTRITION  ASSESSMENT/PLAN NUTRITION  REASSESSMENT NUTRITION   REASSESSMENT NUTRITION   REASSESSMENT NUTRITION  DISCHARGE/FOLLOW-UP NUTRITION  DISCHARGE/FOLLOW-UP   Stages of Change Stages of Change Stages of Change Stages of Change Stages of Change Stages of Change   [] Pre Contemplation  [x] Contemplation  [] Preparation  [] Action  [] Maintenance  [] Relapse [x] Pre Contemplation  [] Contemplation  [] Preparation  [] Action  [] Maintenance  [] Relapse [x] Pre Contemplation  [] Contemplation  [] Preparation  [] Action  [] Maintenance  [] Relapse [x] Pre Contemplation  [] Contemplation  [] Preparation  [] Action  [] Maintenance  [] Relapse [] Pre Contemplation  [] Contemplation  [] Preparation  [] Action  [] Maintenance  [] Relapse [] Pre Contemplation  [] Contemplation  [] Preparation  [] Action  [] Maintenance  [] Relapse   NUTRITION ASSESSMENT NUTRITION ASSESSMENT NUTRITION ASSESSMENT NUTRITION ASSESSMENT NUTRITION ASSESSMENT NUTRITION ASSESSMENT   Weight Management  Weight: 197        Height: 5'10\"   BMI: 28.3  Weight Management  Weight: 197                Weight Management  Weight: 200.8                  Weight Management  Weight: 200.0 Weight Management  Weight: ** Weight Management  Weight: **                    BMI: **   Eating Plan  Current eating habits: try to follow diabetic diet Eating Plan  Changes: none reported Eating Plan  Changes: none reported Eating Plan  Changes: \"none\" Eating Plan  Changes: Eating Plan Improvements:   Alcohol Use  [x] none          [] daily  [] weekly      [] special           Diet Assessment Tool:  RATE YOUR PLATE  *Given to patient to complete and return. Diet Assessment Tool:    Score: 51/72        Diet Assessment Tool: RATE YOUR PLATE  Score: **/95   NUTRITION PLAN NUTRITION PLAN NUTRITION PLAN NUTRITION PLAN NUTRITION PLAN NUTRITION PLAN   *Interventions* *Interventions* *Interventions* *Interventions* *Interventions* *Interventions*   Initial Survey given Goal Setting Discussion:   [x] Yes      [] No        Follow Up Survey Reviewed & Goals Updated:     Professional Referral  Please check if needed. [] Dietitian Consult   [] Wt. Management Referral  [] Other:  Professional Referral  Please check if needed. [] Dietitian Consult   [] Wt. Management Referral  [] Other: Professional Referral  Please check if needed. [] Dietitian Consult   [] Wt. Management Referral  [] Other: Professional Referral  Please check if needed. [] Dietitian Consult   [] Wt. Management Referral  [] Other: Professional Referral  Please check if needed. [] Dietitian Consult   [] Wt. Management Referral  [] Other: Professional Referral  Please check if needed. [] Dietitian Consult   [] Wt.  Management Referral  [] Other:   *Education* *Education* *Education* *Education* *Education* *Education*   Nutritional Education Recommended    [x] 1:1 Registered Dietitian    Workshops  [x] Label Reading   [x] Menu  [x] Targeting Nutrition Priorities  [x] Mindful Eating   Nutritional Education Attended/Date    12/20/22  1:1 Registered Dietitian Nutritional Education Attended/Date    Declined. Nutritional Education Attended/Date Nutritional Education Attended/Date All Sessions Completed? [] Yes  [] No   Cooking School  Recommended     [x] Adding Flavor  [x] Fast & Healthy     Breakfasts  [x] Salads & Dressings  [x] Savory Soups  [x] Simple Sides & Sauces  [x] Appetizers &     Snacks  [x] Delicious Desserts  [x] Plant-Based Proteins  [x] Fast Evening Meals  [x] Weekend Breakfasts  [x] Efficiency Cooking  [x] One-Pot [employer]  Sessions Completed    No Friday Cooking School  Sessions Completed    No [employer]  Sessions Completed    Pt declined coming to cooking classes. Cooking School  Sessions Completed Cooking School    # of sessions completed:  **   *Goals* *Goals* *Goals* *Goals* *Goals* *Goals*   Tereso's nutritional goals are as follows:  Complete and return diet survey Tereso's nutritional goals are as follows:  [x] Attend Nutrition Workshops  [x] Attend 1:1   [] Attend Cooking Classes  [] ** Tereso's nutritional goals are as follows:  [] Attend Nutrition Workshops  [] Attend 1:1   [] Attend Cooking Classes  [x] Complete and return diet survey   Tereso's nutritional goals are as follows:  [x] Attend Nutrition Workshops  [] Attend 1:1 12/20/23  [] Attend Cooking Classes   Tereso's nutritional goals are as follows:  [] Attend Nutrition Workshops  [] Attend 1:1   [] Attend Cooking Classes  [] ** Lanae Monday achieved nutritional goals   [] Yes    [] No  If no, why?   Use knowledge gained to continue Pritikin eating plan at home       Individual Cardiac Treatment Plan - Psychosocial  PSYCHOSOCIAL  ASSESSMENT/PLAN PSYCHOSOCIAL  REASSESSMENT PSYCHOSOCIAL   REASSESSMENT PSYCHOSOCIAL   REASSESSMENT PSYCHOSOCIAL  DISCHARGE/FOLLOW-UP PSYCHOSOCIAL  DISCHARGE/FOLLOW-UP   Stages of Change Stages of Change Stages of Change Stages of Change Stages of Change Stages of Change   [x] Pre Contemplation  [] Contemplation  [] Preparation  [] Action  [] Maintenance  [] Relapse [] Pre Contemplation  [] Contemplation  [] Preparation  [x] Action  [] Maintenance  [] Relapse [] Pre Contemplation  [] Contemplation  [] Preparation  [x] Action  [] Maintenance  [] Relapse [] Pre Contemplation  [] Contemplation  [] Preparation  [x] Action  [] Maintenance  [] Relapse [] Pre Contemplation  [] Contemplation  [] Preparation  [] Action  [] Maintenance  [] Relapse [] Pre Contemplation  [] Contemplation  [] Preparation  [] Action  [] Maintenance  [] Relapse   PSYCHOSOCIAL ASSESSMENT PSYCHOSOCIAL ASSESSMENT PSYCHOSOCIAL ASSESSMENT PSYCHOSOCIAL ASSESSMENT PSYCHOSOCIAL ASSESSMENT PSYCHOSOCIAL ASSESSMENT   Behavioral Outcomes Behavioral Outcomes Behavioral Outcomes Behavioral Outcomes Behavioral Outcomes Behavioral Outcomes   PHQ-9 score 1  Depression Severity  [x]Minimal  []Mild   []Moderate  []Moderately Severe  []Severe    PHQ-9 score **  Depression Severity  []Minimal  []Mild   []Moderate  []Moderately Severe []Severe PHQ-9 score **  Depression Severity  []Minimal  []Mild   []Moderate  []Moderately Severe []Severe   Does patient have Family Support? [x] Yes      [] No  No signs of marital/family distress        Within the Past Month:  *Have you wished you were dead or wished you could go to sleep and not wake up? [] Yes      [x] No  *Have you had any thoughts of killing yourself?   [] Yes      [x] No          Using a scale of 0-10, 0=none, 10=very:   Rate your depression: 0  Rate your anxiety:  0  Using a scale of 0-10, 0=none, 10=very:   Rate your depression: 0  Rate your anxiety:  0 Using a scale of 0-10, 0=none, 10=very:   Rate your depression: na  Rate your anxiety:  na Using a scale of 0-10, 0=none, 10=very:   Rate your depression: 0  Rate your anxiety:  0 Using a scale of 0-10, 0=none, 10=very:   Rate your depression: **  Rate your anxiety:  ** Using a scale of 0-10, 0=none, 10=very:   Rate your depression: **  Rate your anxiety:  **   Signs and Symptoms of Depression Present? [x] Yes       No  Poor eye contact Signs and Symptoms of Depression Present? [] Yes      [x] No   Signs and Symptoms of Depression Present? [] Yes      [x] No   Signs and Symptoms of Depression Present? [] Yes      [x] No   Signs and Symptoms of Depression Present? [] Yes      [] No  If yes, please explain:  ** Signs and Symptoms of Depression Present? [] Yes      [] No  If yes, please explain:  **   Signs and Symptoms of Anxiety Present? [] Yes      [x] No   Signs and Symptoms of Anxiety Present? [] Yes      [x] No   Signs and Symptoms of Anxiety Present? [] Yes      [x] No   Signs and Symptoms of Anxiety Present? [] Yes      [x] No   Signs and Symptoms of Anxiety Present? [] Yes      [] No  If yes, please explain:  ** Signs and Symptoms of Anxiety Present? [] Yes      [] No  If yes, please explain:  **   [x] Patient has poor eye contact   [] Flat affect present. [] Signs of anxiety, anger or hostility    [] Signs social isolation present.    []  Signs of alcohol or substance abuse        PSYCHOSOCIAL PLAN PSYCHOSOCIAL PLAN PSYCHOSOCIAL PLAN PSYCHOSOCIAL PLAN PSYCHOSOCIAL PLAN PSYCHOSOCIAL PLAN   *Interventions* *Interventions* *Interventions* *Interventions* *Interventions* *Interventions*   *Please check if needed  [] Psych Consult  [] Physician Referral  [x] Stress Management Skills *Please check if needed  [] Psych Consult  [] Physician Referral  [x] Stress Management Skills *Please check if needed  [] Psych Consult  [] Physician Referral  [x] Stress Management Skills *Please check if needed  [] Psych Consult  [] Physician Referral  [x] Stress Management Skills *Please check if needed  [] Psych Consult  [] Physician Referral  [] Stress Management Skills *Please check if needed  [] Psych Consult  [] Physician Referral  [] Stress Management Skills   Is patient currently taking anti-depressant or anti-anxiety medications?  [] Yes      [x] No   Change in anti-depressant or anti-anxiety medications?  [] Yes      [x] No  If yes, please list medications:  ** Change in anti-depressant or anti-anxiety medications?  [] Yes      [x] No  If yes, please list medications: na Change in anti-depressant or anti-anxiety medications?  [] Yes      [x] No   Change in anti-depressant or anti-anxiety medications?  [] Yes      [] No  If yes, please list medications:  ** Change in anti-depressant or anti-anxiety medications?  [] Yes      [] No  If yes, please list medications:  **   *Education* *Education* *Education* *Education* *Education* *Education*   Healthy Mind-Set Workshops Recommended  [x] Stress & Health  [x] Taking Charge of Stress  [x] New Thoughts, New Behaviors  [x] Managing Moods & Relationships Healthy Mind-Set Workshops Attended/Date    12/13/22 Taking Charge of Stress Healthy Mind-Set Workshops Attended/Date    1/3  Manage moods    Declined additional education. Healthy Mind-Set Workshops Attended/Date     Healthy Mind-Set Workshops  Completed  [] Yes      [] No Healthy Mind-Set Workshops  Completed  [] Yes      [] No   *Goals* *Goals* *Goals* *Goals* *Goals* *Goals*   Tereso's psychosocial goals are as follows:  Complete HADS & Buster Todd Quality of Life Index, Cardiac Version IV Tereso's psychosocial goals are as follows:  [x] Attend Healthy Mind-Set Workshops  [x] Reduce depression symptom severity by 1 level  [] ** Tereso's psychosocial goals are as follows:  [] Attend Healthy Mind-Set Workshops  [x] Reduce depression symptom severity by 1 level   Tereso's psychosocial goals are as follows:  [x] Attend Healthy Mind-Set Workshops  [x] Reduce depression symptom severity by 1 level   Tereso achieved psychosocial goals?  [] Yes    [] No  If no, why?  **  [] Use  methods learned to continue to reduce depression symptom severity by 1 level  [] Carmelina Posadas achieved psychosocial goals?   [] Yes    [] No  If no, why?  **  [] Use methods learned to continue to reduce depression symptom severity by 1 level  [] **     Individual Cardiac Treatment Plan - Other:  Risk Factor/Education  RISK FACTOR  ASSESSMENT/PLAN RISK FACTOR  REASSESSMENT  RISK FACTOR  REASSESSMENT RISK FACTOR  REASSESSMENT RISK FACTOR   DISCHARGE/FOLLOW-UP RISK FACTOR   DISCHARGE/FOLLOW-UP   Stages of Change Stages of Change Stages of Change Stages of Change Stages of Change Stages of Change   [x] Pre Contemplation  [] Contemplation  [] Preparation  [] Action  [] Maintenance  [] Relapse [] Pre Contemplation  [x] Contemplation  [] Preparation  [] Action  [] Maintenance  [] Relapse [] Pre Contemplation  [x] Contemplation  [] Preparation  [] Action  [] Maintenance  [] Relapse [] Pre Contemplation  [x] Contemplation  [] Preparation  [] Action  [] Maintenance  [] Relapse [] Pre Contemplation  [] Contemplation  [] Preparation  [] Action  [] Maintenance  [] Relapse [] Pre Contemplation  [] Contemplation  [] Preparation  [] Action  [] Maintenance  [] Relapse   RISK FACTOR/EDUCATION ASSESSMENT RISK FACTOR/EDUCATION ASSESSMENT RISK FACTOR/EDUCATION ASSESSMENT RISK FACTOR/EDUCATION ASSESSMENT RISK FACTOR /EDUCATION ASSESSMENT RISK FACTOR /EDUCATION ASSESSMENT   Hypertension  [x] Yes      [] No    Resting BP: 86/42  Peak Ex BP:132/62  Medication: metoprolol, amlodipine, entresto   Hypertension  Resting BP: 114/62  Peak Ex BP:112/58  Medication Changes:  [] Yes      [x] No Hypertension  Resting BP: 120/68  Peak Ex BP: 124/70  Medication Changes:  [] Yes      [x] No Hypertension  Resting BP: 90/66  Peak Ex BP:94/60  Medication Changes:  [] Yes      [x] No Hypertension  Resting BP: **  Peak Ex BP:**  Medication Changes:  [] Yes      [] No Hypertension  Resting BP: **  Peak Ex BP:**  Medication Changes:  [] Yes      [] No Lipids  HLD/DLD  [x] Yes      [] No  TOTAL CHOL: 236  HDL:  46  LDL:  163  TRI  Medication: atorvastatin Lipids  Medication Changes:  [] Yes      [x] No     Lipids  Medication Changes:  [] Yes      [x] No     Lipids  Medication Changes:  [] Yes      [x] No     Lipids    TOTAL CHOL: **  HDL:  **  LDL:  **  TRIG:  **  Medication Changes:  [] Yes      [] No Lipids    TOTAL CHOL: **  HDL:  **  LDL:  **  TRIG:  **  Medication Changes:  [] Yes      [] No   Diabetes  [x] Yes      [] No  FBS: 201           HbA1C: 8.7        Monitor BS @ home:   [x] Yes      [] No  Frequency: 2 x per week  Medication: empagliflozin- metformin, linagliptin Diabetes  Most Recent BS:  BS have been in range  [] Yes      [x] No  Medication Changes  [] Yes      [x] No   Diabetes  Most Recent BS: na  BS have been in range  [] Yes      [x] No  Medication Changes  [] Yes      [x] No     Diabetes  Most Recent BS:250  BS have been in range  [] Yes      [x] No  Medication Changes  [] Yes      [x] No       Diabetes  Most Recent BS:  BS have been in range  [] Yes      [] No  Medication Changes  [] Yes      [] No Diabetes  Most Recent BS:  BS have been in range  [] Yes      [] No  Medication Changes  [] Yes      [] No       Tobacco Use  [x] Current  [] Former  [] Never    Years smoked: 40+:      Quit date set:  [x] Yes      [] No  Date: 23  # cigarettes smoked/day: 8  Smokeless Tobacco use:   [] Yes      [x] No   Tobacco Use  Change in smoking status   [] Yes      [x] No    Continues to smoke 8 cigs/day   Tobacco Use  Change in smoking status   [] Yes      [x] No    Pt still smoking.   Tobacco Use  Change in smoking status   [] Yes      [x] No   Pt reports he is still smoking 6 cigs a day  Tobacco Use  Change in smoking status   [] Yes      [] No    Quit date: ** Tobacco Use  Change in smoking status   [] Yes      [] No    Quit date: **             Learning Barriers  Please select one:  [] Speech  [] Literacy  [] Hearing  [] Cognitive  [] Vision  [x] Ready to Learn Learning Barriers Addressed:   [x] Yes      [] No   Learning Barriers Addressed:   [x] Yes      [] No   Learning Barriers Addressed:  [x] Yes      [] No Learning Barriers Addressed:  [] Yes      [] No Learning Barriers Addressed:  [] Yes      [] No     RISK FACTOR/EDUCATION PLAN RISK FACTOR/EDUCATION PLAN RISK FACTOR/EDUCATION PLAN RISK FACTOR/EDUCATION PLAN RISK FACTOR/EDUCATION PLAN RISK FACTOR/EDUCATION PLAN   *Interventions* *Interventions* *Interventions* *Interventions* *Interventions* *Interventions*   Recommended Educational Videos    [x] Overview of The Pritikin Eating Plan  [x] Heart Disease Risk Reduction  [x] Move It! [x] Calorie Density  [x] Healthy Minds, Bodies, Hearts  [x] Label Reading  [x] Metabolic Syndrome & Belly   Or  [] How Our Thoughts Can Heal our Heart  [x] Dining Out-Part 1  [x] Biomechanical Limitations  [x] Facts on Fat  [x] Hypertension & Heart Disease  [x] Diseases of Our Times-Focusing on Diabetes  [x] Body Composition  [x] Nurtition Action Plan  [x] Exercise Action Plan   Completed Videos/Date      12/1/22  Overview of The Pritikin Eating Plan    12/8/22  Heart Disease Risk Reduction    12/15/22  Move It!    12/29/22  Calorie Density Completed Videos/Date    1/10/23  Healthy minds, bodies, hearts. Declined further education videos. Completed Videos/Date    Metabolic syndrome 8/53/85    Exercise action plan 2/14/23    Smoking cessation 2/21/23 Completed Videos/Date Recommended Educational Videos Completed    [] Yes      [] No    **If not completed, Why? **           Smoking Cessation/Relaspe Prevention Intervention needed? [x] Yes      [] No  *Pt verbalizes and agrees to attend intervention Smoking Cessation/Relapse Prevention Scheduled? [x] Yes      [] No   Smoking Cessation/Relapse Prevention completed? [] Yes      [x] No      Pt declined. Smoking Cessation/Relapse Prevention completed?    [x] Yes      [] No  Date: 2/21/23 Smoking Cessation/Relapse Prevention completed? [] Yes      [] No  Date: ** Smoking Cessation Counseling attended  [] Yes      [] No  **If not completed, Why? **   Professional Referrals:  *Please check if needed  [] Diabetes Clinic  [] Lipid Clinic   [] Other:      Professional Referrals:  *Please check if needed  [] Diabetes Clinic  [] Lipid Clinic   [] Other:   Preventative Medication Preventative Medication Preventative Medication Preventative Medication Preventative Medication Preventative Medication   Aspirin  [x] Yes    [] No  Blood Thinner: Clopidogrel/Effient/Brillinta  [x] Yes    [] No  Beta Blocker  [x] Yes    [] No  Ace Inhibitor  [] Yes    [x] No  Statin/Lipid Lowering  [x] Yes    [] No Medication Changes? [] Yes    [x] No Medication Changes? [] Yes    [x] No Medication Changes? [] Yes    [x] No Medication Changes? [] Yes    [] No Medication Changes? [] Yes    [] No   *Education* *Education* *Education* *Education* *Education* *Education*   Does Narendra Sandy require any additional education? [] Yes    [] No   Does Narendra Sandy require any additional education? [x] Yes    [] No Does Hudson Sandy require any additional education? [] Yes    [x] No Does Narendra Sandy require any additional education? [] Yes    [x] No Does Narendra Sandy require any additional education? [] Yes    [] No Does Hudson Sandy require any additional education?   [] Yes    [] No   Additional Educational Videos    Exercise  [] Improve Performance    Medical  [] Aging Enhancing Your QoL  [] Biology of Weight Control  [] Decoding Lab Results  [] Diseases of Our Time - Overview  [] Sleep Disorders    Nutrition  [] Dining Out - Part 2  [] Fueling a Healthy Body  [] Menu Workshop  [] Planning Your Eating Strategy  [] Targeting Your Nutrition Priorities  [] Vitamins & Minerals    Healthy Mind-Set  [x] Smoking Cessation    Culinary  []Becoming a Pritikin    [] Cooking - Breakfast & Snacks  [] Cooking -[place] Salads & Dressing  [] Cooking -[address] [place] [address]  [] Cooking -Soups & Desserts    Overview  [] The Pritikin Solution Additional Educational Videos Completed Additional Educational Videos Completed Additional Educational Videos Completed Additional Educational Videos Completed Additional Educational Videos Completed    [] Yes    [] No   *Goals* *Goals* *Goals* *Goals* *Goals* *Goals*   Tereso's risk factor/education goals are as follows:    [x] Optimal BP <140/90  [x] Blood Sugar <120  [x] Attend recommended video education sessions  [x] Takes medications as prescribed 100% of the time   [] ** Tereso's risk factor/education goals are as follows:    [x] Optimal BP <140/90  [x] Blood Sugar <120  [x] Attend recommended video education sessions  [x] Takes medications as prescribed 100% of the time   [] ** Tereso's risk factor/education goals are as follows:    [x] Optimal BP <140/90  [x] Blood Sugar <120  [x] Attend recommended video education sessions  [x] Takes medications as prescribed 100% of the time   [] ** Tereso's risk factor/education goals are as follows:    [x] Optimal BP <140/90  [x] Blood Sugar <120  [x] Attend recommended video education sessions  [x] Takes medications as prescribed 100% of the time    Tereso's risk factor/education goals are as follows:    [x] Optimal BP <140/90  [] Blood Sugar <120  [x] Attend recommended video education sessions  [x] Takes medications as prescribed 100% of the time   [] Royer Mendoza achieved risk factor goals?   [] Yes    [] No  If no, why?  **     Monitored telemetry has revealed NSR with occ PVC    [x] associated symptoms SOB Monitored telemetry has revealed NSR   Monitored telemetry has revealed NSR   Monitored telemetry has revealed NSR Monitored telemetry has revealed **  [] documented arrhythmia at increasing workloads  [] associated symptoms ** Monitored telemetry has revealed **  [] documented arrhythmia at increasing workloads  [] associated symptoms **   Physician Response    [x] Cardiac rehab is reasonably and medically necessary for continuous cardiac monitoring surveillance  of patient's cardiac activity  [x] Initiate continuous telemetry monitoring and notify me with any concerns  [] Other   Physician Response    [x] Cardiac rehab is reasonably and medically necessary for continuous cardiac monitoring surveillance  of patient's cardiac activity  [x] Continue continuous telemetry monitoring and notify me with any concerns  [] Other     Physician Response    [x] Cardiac rehab is reasonably and medically necessary for continuous cardiac monitoring surveillance  of patient's cardiac activity  [x] Continue continuous telemetry monitoring and notify me with any concerns   [] Other     Physician Response    [x] Cardiac rehab is reasonably and medically necessary for continuous cardiac monitoring surveillance  of patient's cardiac activity  [x] Continue continuous telemetry monitoring and notify me with any concerns   [] Other     Physician Response    [x] Cardiac rehab is reasonably and medically necessary for continuous cardiac monitoring surveillance  of patient's cardiac activity  [x] Continue continuous telemetry monitoring and notify me with any concerns   [] Other      Cosigned by: Dagoberto Garcia MD at 12/5/2022  3:33 PM     Revision History  Date/Time User Provider Type Action   12/5/2022  3:33 PM Dagoberto Garcia MD Physician Cosign   12/1/2022  1:28 PM Jack Kohler EP Exercise Physiologist Sign      Cosigned by: Dagoberto Garcia MD at 12/29/2022 12:57 PM     Revision History  Date/Time User Provider Type Action   12/29/2022 12:57 PM Dagoberto Garcia MD Physician Cosign   12/29/2022 12:34 PM CARLOTA Gaspar Exercise Physiologist Sign     Cosigned by: Dagoberto Garcia MD at 1/30/2023  8:51 AM     Revision History  Date/Time User Provider Type Action   1/30/2023  8:51 AM Dagoberto Garcia MD Physician Cosign   1/26/2023  8:22 AM CARLOTA Portillo Exercise Physiologist Sign

## 2023-02-28 ENCOUNTER — APPOINTMENT (OUTPATIENT)
Dept: CARDIAC REHAB | Age: 63
End: 2023-02-28
Payer: MEDICAID

## 2023-02-28 ENCOUNTER — HOSPITAL ENCOUNTER (OUTPATIENT)
Dept: CARDIAC REHAB | Age: 63
Setting detail: THERAPIES SERIES
Discharge: HOME | End: 2023-02-28
Payer: MEDICAID

## 2023-02-28 ENCOUNTER — HOSPITAL ENCOUNTER (OUTPATIENT)
Dept: NON INVASIVE DIAGNOSTICS | Age: 63
Discharge: HOME | End: 2023-02-28
Payer: MEDICAID

## 2023-02-28 DIAGNOSIS — I51.9 LV DYSFUNCTION: ICD-10-CM

## 2023-02-28 LAB
LV EF: 28 %
LVEF MODALITY: NORMAL

## 2023-02-28 PROCEDURE — G0422 INTENS CARDIAC REHAB W/EXERC: HCPCS

## 2023-02-28 PROCEDURE — 93307 TTE W/O DOPPLER COMPLETE: CPT

## 2023-02-28 NOTE — PROGRESS NOTES
Abdiaziz Salvador YOB: 1960    Acct Number:    MRN:  [de-identified]                             [facility] NUTRITION WORKSHOP             Date: 2023        Session # _______    Carmella Kimble class covered:    []   Fueling a Healthy Body  []   Mindful Eating  []   Targeting Nutrition Priorities  []   Dining Out :  Making the Most of a Menu  [x]   Label Reading    Readiness to change:    []  Pre-contemplative   []  Contemplative - ambivalent about change    []  Action - ready to set action plan and implement   [x]  Maintenance - has made change and is trying, and or practicing different alternative         behaviors     Chauncey Hanson was in the Workshop with the Dietitian for 45 minutes. The content was presented via Powerpoint, lecture, and patient participation based format. Motivational interviewing was utilized when needed, to promote change. Patient voiced understanding.     Electronically signed by Jose Estrada RD on 2023 at 9:48 AM

## 2023-03-02 ENCOUNTER — OFFICE VISIT (OUTPATIENT)
Dept: CARDIOLOGY CLINIC | Age: 63
End: 2023-03-02
Payer: MEDICAID

## 2023-03-02 VITALS
RESPIRATION RATE: 18 BRPM | SYSTOLIC BLOOD PRESSURE: 109 MMHG | HEIGHT: 70 IN | WEIGHT: 193 LBS | BODY MASS INDEX: 27.63 KG/M2 | DIASTOLIC BLOOD PRESSURE: 62 MMHG | HEART RATE: 82 BPM

## 2023-03-02 DIAGNOSIS — I50.40 COMBINED SYSTOLIC AND DIASTOLIC CONGESTIVE HEART FAILURE, UNSPECIFIED HF CHRONICITY (HCC): ICD-10-CM

## 2023-03-02 DIAGNOSIS — I25.10 CORONARY ARTERY DISEASE INVOLVING NATIVE CORONARY ARTERY OF NATIVE HEART WITHOUT ANGINA PECTORIS: Primary | ICD-10-CM

## 2023-03-02 PROCEDURE — 4004F PT TOBACCO SCREEN RCVD TLK: CPT | Performed by: INTERNAL MEDICINE

## 2023-03-02 PROCEDURE — 99214 OFFICE O/P EST MOD 30 MIN: CPT | Performed by: INTERNAL MEDICINE

## 2023-03-02 PROCEDURE — 3078F DIAST BP <80 MM HG: CPT | Performed by: INTERNAL MEDICINE

## 2023-03-02 PROCEDURE — 93000 ELECTROCARDIOGRAM COMPLETE: CPT | Performed by: INTERNAL MEDICINE

## 2023-03-02 PROCEDURE — 3017F COLORECTAL CA SCREEN DOC REV: CPT | Performed by: INTERNAL MEDICINE

## 2023-03-02 PROCEDURE — 3074F SYST BP LT 130 MM HG: CPT | Performed by: INTERNAL MEDICINE

## 2023-03-02 PROCEDURE — G8419 CALC BMI OUT NRM PARAM NOF/U: HCPCS | Performed by: INTERNAL MEDICINE

## 2023-03-02 PROCEDURE — G8427 DOCREV CUR MEDS BY ELIG CLIN: HCPCS | Performed by: INTERNAL MEDICINE

## 2023-03-02 PROCEDURE — G8484 FLU IMMUNIZE NO ADMIN: HCPCS | Performed by: INTERNAL MEDICINE

## 2023-03-02 RX ORDER — BUMETANIDE 1 MG/1
TABLET ORAL
COMMUNITY
Start: 2023-02-23

## 2023-03-02 RX ORDER — SACUBITRIL AND VALSARTAN 24; 26 MG/1; MG/1
TABLET, FILM COATED ORAL
COMMUNITY
Start: 2023-02-23

## 2023-03-02 ASSESSMENT — ENCOUNTER SYMPTOMS
APNEA: 0
NAUSEA: 0
CHOKING: 0
STRIDOR: 0
ABDOMINAL PAIN: 0
WHEEZING: 0
ABDOMINAL DISTENTION: 0
VOMITING: 0
ANAL BLEEDING: 0
SHORTNESS OF BREATH: 1
VOICE CHANGE: 0
COUGH: 0
CHEST TIGHTNESS: 0
TROUBLE SWALLOWING: 0
COLOR CHANGE: 0
BLOOD IN STOOL: 0

## 2023-03-02 NOTE — PROGRESS NOTES
[address]  Dept: [address]  Loc: [phone]     3/2/2023       Morenita Jones is here today for   Chief Complaint   Patient presents with    Follow-up           Referring Physician:  No ref. provider found     Patient Active Problem List   Diagnosis    COPD (chronic obstructive pulmonary disease) (HCC)    CAD (coronary artery disease)    Post PTCA with MI    Hyperlipidemia    HTN (hypertension)    Tobacco abuse    Polyp of colon    Chest pain syndrome    Abnormal stress ECG with treadmill    Diabetes mellitus type II, uncontrolled    S/P CABG x 2    Uncontrolled type 2 diabetes mellitus, without long-term current use of insulin    Mild left ventricular systolic dysfunction    Dizzinesses    Angina pectoris, crescendo (HCC)    Hypotension    Ventricular tachycardia       Review of Systems   Constitutional:  Negative for activity change, appetite change, fatigue, fever and unexpected weight change. HENT:  Negative for congestion, trouble swallowing and voice change. Eyes:  Negative for visual disturbance. Respiratory:  Positive for shortness of breath. Negative for apnea, cough, choking, chest tightness, wheezing and stridor. Cardiovascular:  Negative for chest pain, palpitations and leg swelling. Gastrointestinal:  Negative for abdominal distention, abdominal pain, anal bleeding, blood in stool, nausea and vomiting. Endocrine: Negative for cold intolerance and heat intolerance. Genitourinary:  Negative for hematuria. Musculoskeletal:  Negative for arthralgias, gait problem, joint swelling and myalgias. Skin:  Negative for color change and rash. Allergic/Immunologic: Negative for environmental allergies and food allergies. Neurological:  Negative for dizziness, tremors, syncope, facial asymmetry, weakness, light-headedness, numbness and headaches.    Hematological: Does not bruise/bleed easily. Psychiatric/Behavioral:  Negative for agitation, behavioral problems and sleep disturbance. Past Medical History:   Diagnosis Date    CAD (coronary artery disease)     CHF (congestive heart failure) ([facility])     COPD (chronic obstructive pulmonary disease) ([facility])     Diabetes mellitus ([facility])     Hyperlipidemia     PONV (postoperative nausea and vomiting)     Sleep apnea     has CPAP doesn't wear       No Known Allergies    Current Outpatient Medications   Medication Sig Dispense Refill    ENTRESTO 24-26 MG per tablet TAKE 1 TABLET BY MOUTH TWICE DAILY      bumetanide (BUMEX) 1 MG tablet TAKE 1 TABLET BY MOUTH ONCE DAILY      dapagliflozin (FARXIGA) 10 MG tablet Take 1 tablet by mouth every morning 90 tablet 1    albuterol (PROVENTIL) (2.5 MG/3ML) 0.083% nebulizer solution Take 3 mLs by nebulization every 6 hours as needed for Wheezing 120 each 2    linagliptin (TRADJENTA) 5 MG tablet Take 1 tablet by mouth daily 90 tablet 3    Empagliflozin-metFORMIN HCl (SYNJARDY) 5-1000 MG TABS Take 1 tablet BID 60 tablet 5    metoprolol succinate (TOPROL XL) 25 MG extended release tablet Take 1 tablet by mouth daily 90 tablet 3    atorvastatin (LIPITOR) 80 MG tablet Take 0.5 tablets by mouth nightly 90 tablet 3    amLODIPine (NORVASC) 5 MG tablet Take 1 tablet by mouth daily 90 tablet 3    clopidogrel (PLAVIX) 75 MG tablet Take 1 tablet by mouth daily 90 tablet 3    nitroGLYCERIN (NITROSTAT) 0.4 MG SL tablet Place 1 tablet under the tongue every 5 minutes as needed for Chest pain 25 tablet 3    aspirin 325 MG tablet Take 325 mg by mouth daily      glucose blood VI test strips (FREESTYLE LITE) strip TEST twice a day 100 strip 3    FREESTYLE LANCETS MISC TEST twice a day 100 each 3     No current facility-administered medications for this visit.        Social History     Socioeconomic History    Marital status:      Spouse name: Claire Hernandez    Number of children: 2    Years of education: 15 Highest education level: High school graduate   Tobacco Use    Smoking status: Every Day     Packs/day: 0.25     Years: 32.00     Pack years: 8.00     Types: Cigarettes    Smokeless tobacco: Never   Vaping Use    Vaping Use: Never used   Substance and Sexual Activity    Alcohol use: No     Alcohol/week: 0.0 standard drinks    Drug use: No    Sexual activity: Yes     Partners: Female     Social Determinants of Health     Financial Resource Strain: Low Risk     Difficulty of Paying Living Expenses: Not hard at all   Food Insecurity: No Food Insecurity    Worried About Running Out of Food in the Last Year: Never true    Ran Out of Food in the Last Year: Never true   Transportation Needs: No Transportation Needs    Lack of Transportation (Medical): No    Lack of Transportation (Non-Medical): No   Physical Activity: Inactive    Days of Exercise per Week: 0 days    Minutes of Exercise per Session: 0 min   Stress: No Stress Concern Present    Feeling of Stress : Only a little   Housing Stability: Low Risk     Unable to Pay for Housing in the Last Year: No    Number of Places Lived in the Last Year: 1    Unstable Housing in the Last Year: No       Family History   Problem Relation Age of Onset    High Blood Pressure Mother     Heart Disease Father     High Blood Pressure Sister     Heart Disease Brother     Heart Disease Brother     Diabetes Paternal Cousin     Cancer Neg Hx     Stroke Neg Hx        Blood pressure 109/62, pulse 82, resp. rate 18, height 5' 10\" (1.778 m), weight 193 lb (87.5 kg).     Physical Exam:    General Appearance: alert and oriented to person, place and time, in no acute distress  Cardiovascular: normal rate, regular rhythm, normal S1 and S2, no murmurs, rubs, clicks, or gallops, distal pulses intact, no carotid bruits, no JVD  Pulmonary/Chest: clear to auscultation bilaterally- no wheezes, rales or rhonchi, normal air movement, no respiratory distress  Abdomen: soft, non-tender, non-distended, normal bowel sounds, no masses   Extremities: no cyanosis, clubbing or edema, pulse   Skin: warm and dry, no rash or erythema  Head: normocephalic and atraumatic  Eyes: pupils equal, round, and reactive to light  Neck: supple and non-tender without mass, no thyromegaly   Musculoskeletal: normal range of motion, no joint swelling, deformity or tenderness  Neurological: alert, oriented, normal speech, no focal findings or movement disorder noted    Lab Data:    Cardiac Enzymes:  No results for input(s): CKTOTAL, CKMB, CKMBINDEX, TROPONINI in the last 72 hours.     CBC:   Lab Results   Component Value Date/Time    WBC 7.2 10/31/2022 03:32 AM    RBC 3.83 10/31/2022 03:32 AM    RBC 4.19 09/02/2011 07:06 AM    HGB 12.2 10/31/2022 03:32 AM    HCT 37.8 10/31/2022 03:32 AM     10/31/2022 03:32 AM       CMP:    Lab Results   Component Value Date/Time     10/31/2022 03:32 AM    K 4.7 10/31/2022 03:32 AM    CL 98 10/31/2022 03:32 AM    CO2 20 10/31/2022 03:32 AM    BUN 28 10/31/2022 03:32 AM    CREATININE 0.8 10/31/2022 03:32 AM    AGRATIO 1.8 11/18/2015 08:16 AM    LABGLOM >60 10/31/2022 12:16 AM    GLUCOSE 206 10/31/2022 03:32 AM    GLUCOSE 139 11/18/2015 08:16 AM    CALCIUM 9.0 10/31/2022 03:32 AM       Hepatic Function Panel:    Lab Results   Component Value Date/Time    ALKPHOS 107 11/09/2022 12:45 PM    ALT 32 11/09/2022 12:45 PM    AST 16 11/09/2022 12:45 PM    PROT 7.7 11/09/2022 12:45 PM    BILITOT 0.3 11/09/2022 12:45 PM    BILIDIR <0.2 11/09/2022 12:45 PM    LABALBU 4.1 11/09/2022 12:45 PM    LABALBU 4.0 09/02/2011 07:06 AM       Magnesium:    Lab Results   Component Value Date/Time    MG 2.5 10/29/2022 10:39 AM       PT/INR:    Lab Results   Component Value Date/Time    INR 1.35 10/29/2022 10:39 AM       HgBA1c:    Lab Results   Component Value Date/Time    LABA1C 8.5 02/10/2023 06:07 AM       FLP:    Lab Results   Component Value Date/Time    TRIG 135 11/29/2021 07:43 AM    HDL 46 11/29/2021 07:43 AM [address] 11/29/2021 07:43 AM    LDLDIRECT 103 10/31/2015 08:57 AM       TSH:    Lab Results   Component Value Date/Time    TSH 2.290 11/29/2021 07:43 AM        Diagnosis Orders   1. Coronary artery disease involving native coronary artery of native heart without angina pectoris  26696 - [place] ELECTROCARDIOGRAM, COMPLETE    ICD analysis dual with reprogram      2. Combined systolic and diastolic congestive heart failure, unspecified HF chronicity ([address]  ICD analysis dual with reprogram           Assessment/Plan    Gibson Morataya is 58years old gentleman with a history of coronary artery disease he had a history of CABG history of prior intervention of the circumflex. History of significant disease of proximal LAD that opened septal  and diagonal artery that may consider intervention he had a systolic dysfunction and an echo showed ejection fraction 25 to 30%. Patient has a life vest.  Arrangements will be made for the patient to have an AICD for primary prevention. Patient has been on the [place] the [facility] [place] and beta-blockers. The patient with dyspnea on exertion his EKG showed sinus rhythm occasional PVCs    We discussed the AICD implantation with him and he is in agreement to have it done. The patient advised about smoke cessation he probably has peripheral vascular disease claudication like symptoms. He was encouraged to increase his activity the patient had a history of diabetes milliliters and he was advised about diet. The patient will be seen 2 weeks after the insertion of his AICD he was advised about the importance smoke cessation exercise risk factor modification he is involved in a cardiac rehab.   All his questions were answered to his satisfaction and his lab work is EKG findings were discussed with him thank    Orders Placed This Encounter   Procedures    ICD analysis dual with reprogram     Standing Status:   Future     Standing Expiration Date:   3/1/2024    96661 - [place] ELECTROCARDIOGRAM, COMPLETE       Return in about 2 weeks (around 3/16/2023) for AFTER AICD INSERTION.      Karyna Zapata MD

## 2023-03-19 ENCOUNTER — HOSPITAL ENCOUNTER (EMERGENCY)
Age: 63
Discharge: HOME | End: 2023-03-19
Attending: EMERGENCY MEDICINE
Payer: MEDICAID

## 2023-03-19 ENCOUNTER — APPOINTMENT (OUTPATIENT)
Dept: GENERAL RADIOLOGY | Age: 63
End: 2023-03-19
Payer: MEDICAID

## 2023-03-19 VITALS
SYSTOLIC BLOOD PRESSURE: 115 MMHG | DIASTOLIC BLOOD PRESSURE: 81 MMHG | TEMPERATURE: 98.7 F | BODY MASS INDEX: 28.7 KG/M2 | WEIGHT: 200 LBS | HEART RATE: 97 BPM | RESPIRATION RATE: 16 BRPM | OXYGEN SATURATION: 99 %

## 2023-03-19 DIAGNOSIS — J02.0 STREP PHARYNGITIS: Primary | ICD-10-CM

## 2023-03-19 LAB
FLUAV RNA RESP QL NAA+PROBE: NOT DETECTED
FLUBV RNA RESP QL NAA+PROBE: NOT DETECTED
S PYO AG THROAT QL: POSITIVE
S PYO THROAT QL CULT: NORMAL
SARS-COV-2 RNA RESP QL NAA+PROBE: NOT DETECTED

## 2023-03-19 PROCEDURE — 71046 X-RAY EXAM CHEST 2 VIEWS: CPT

## 2023-03-19 PROCEDURE — 87880 STREP A ASSAY W/OPTIC: CPT

## 2023-03-19 PROCEDURE — 99284 EMERGENCY DEPT VISIT MOD MDM: CPT

## 2023-03-19 PROCEDURE — 87636 SARSCOV2 & INF A&B AMP PRB: CPT

## 2023-03-19 RX ORDER — AMOXICILLIN 500 MG/1
500 CAPSULE ORAL 2 TIMES DAILY
Qty: 20 CAPSULE | Refills: 0 | Status: SHIPPED | OUTPATIENT
Start: 2023-03-19 | End: 2023-03-29

## 2023-03-19 ASSESSMENT — PAIN SCALES - GENERAL: PAINLEVEL_OUTOF10: 2

## 2023-03-19 ASSESSMENT — PAIN - FUNCTIONAL ASSESSMENT: PAIN_FUNCTIONAL_ASSESSMENT: 0-10

## 2023-03-19 ASSESSMENT — PAIN DESCRIPTION - LOCATION: LOCATION: THROAT

## 2023-03-19 ASSESSMENT — PAIN DESCRIPTION - DESCRIPTORS: DESCRIPTORS: ACHING

## 2023-03-19 NOTE — ED PROVIDER NOTES
200 lb (90.7 kg)        1) Number and Complexity of Problems        Problem List This Visit:   Cough and Pharyngitis      Differential Diagnosis includes (but not limited to):   URI, bronchitis, pneumonia    Diagnoses Considered but I have low suspicion of:   CHF exacerbation        Pertinent Comorbid Conditions:    See HPI, PMH and PSH    2)  Data Reviewed (none if left blank)    My Independent interpretations:       EKG:   Not indicated    External Documentation Reviewed:   Care everywhere in [facility] is reviewed. Previous patient encounter documents & history available on EMR was reviewed:   Yes    See Formal Diagnostic Results above for the lab and radiology tests and orders. 3)  Treatment and Disposition    ED Reassessment:  Stable ED stay    Case discussed with consulting clinician:    None    Shared Decision-Making:   Treatment plan and disposition discussed with the patient/family, questions answered     Code Status:    Reviewed with patient and/or family as full code    ED Medications administered this visit:  (None if blank)  Medications - No data to display    PROCEDURES:   None    CRITICAL CARE:   None    FINAL IMPRESSION      1.  Strep pharyngitis          DISPOSITION/PLAN   DISPOSITION Decision To Discharge 03/19/2023 03:42:14 PM      OUTPATIENT FOLLOW UP THE PATIENT:  Bill Flood [address] [facility]  [phone]    In 1 week  As needed  DISCHARGE MEDICATIONS:(None if blank)  New Prescriptions    AMOXICILLIN (AMOXIL) 500 MG CAPSULE    Take 1 capsule by mouth 2 times daily for 10 days         MD Ariana Felton MD  03/19/23 0713

## 2023-03-19 NOTE — ED TRIAGE NOTES
Patient presents to ED with chief complaint of cough and pharyngitis. Symptoms have been going on for a few days. Patient resting in bed. Respirations easy and unlabored. No distress noted. Call light within reach.

## 2023-03-20 ENCOUNTER — TELEPHONE (OUTPATIENT)
Dept: FAMILY MEDICINE CLINIC | Age: 63
End: 2023-03-20

## 2023-03-21 ENCOUNTER — HOSPITAL ENCOUNTER (OUTPATIENT)
Dept: CARDIAC REHAB | Age: 63
Setting detail: THERAPIES SERIES
Discharge: HOME | End: 2023-03-21

## 2023-03-21 NOTE — PLAN OF CARE
Change  Stages of Change   [x] Pre Contemplation  [] Contemplation  [] Preparation  [] Action  [] Maintenance  [] Relapse [] Pre Contemplation  [] Contemplation  [] Preparation  [x] Action  [] Maintenance  [] Relapse [] Pre Contemplation  [] Contemplation  [] Preparation  [x] Action  [] Maintenance  [] Relapse [] Pre Contemplation  [] Contemplation  [] Preparation  [x] Action  [] Maintenance  [] Relapse  [] Pre Contemplation  [] Contemplation  [] Preparation  [] Action  [] Maintenance  [] Relapse   PSYCHOSOCIAL ASSESSMENT PSYCHOSOCIAL ASSESSMENT PSYCHOSOCIAL ASSESSMENT PSYCHOSOCIAL ASSESSMENT  PSYCHOSOCIAL ASSESSMENT   Behavioral Outcomes Behavioral Outcomes Behavioral Outcomes Behavioral Outcomes  Behavioral Outcomes   PHQ-9 score 1  Depression Severity  [x]Minimal  []Mild   []Moderate  []Moderately Severe  []Severe     PHQ-9 score **  Depression Severity  []Minimal  []Mild   []Moderate  []Moderately Severe []Severe   Does patient have Family Support? [x] Yes      [] No  No signs of marital/family distress        Within the Past Month:  *Have you wished you were dead or wished you could go to sleep and not wake up? [] Yes      [x] No  *Have you had any thoughts of killing yourself? [] Yes      [x] No          Using a scale of 0-10, 0=none, 10=very:   Rate your depression: 0  Rate your anxiety:  0  Using a scale of 0-10, 0=none, 10=very:   Rate your depression: 0  Rate your anxiety:  0 Using a scale of 0-10, 0=none, 10=very:   Rate your depression: na  Rate your anxiety:  na Using a scale of 0-10, 0=none, 10=very:   Rate your depression: 0  Rate your anxiety:  0  Using a scale of 0-10, 0=none, 10=very:   Rate your depression: **  Rate your anxiety:  **   Signs and Symptoms of Depression Present? [x] Yes       No  Poor eye contact Signs and Symptoms of Depression Present? [] Yes      [x] No   Signs and Symptoms of Depression Present? [] Yes      [x] No   Signs and Symptoms of Depression Present?     [] Yes

## 2023-03-29 NOTE — PLAN OF CARE
Workloads  TM:  @ %=  METs  AD:  level =  METs  NS:   Golden=  METs  AE:  level =  METs     Frequency:    ICR: 3x/week  Home: 2-3x/wk Frequency:   ICR: 3x/week  Home: 3x/wk Frequency:  ICR: 3x/week  Home: 3-4x/wk Frequency:  ICR: 3x/week  Home: 3-4x/wk  Frequency:  Magali Tamayo will continue exercise at  5-7 days/week   Duration:   Total aerobic exercise = 30-45 min    5-8 min/mode Duration:  Total aerobic exercises = 23 min   8-10min/mode Duration:  Total aerobic exercises = 25-30 min     5-10 min/mode Duration:  Total aerobic exercises = 15 min     15min/mode  Duration:  Total erobic exercise =  60-90 min   Intensity:   MET Level = 2.1  RPE = 12-15 Intensity:  Max MET Level = 2.3  RPE = 12-15 Intensity:  Max MET Level = 2.3  RPE = 12-15 Intensity:  Max MET Level = 2.3  RPE = 12-15  Intensity:  Max MET Level = ** RPE = 12-15   Progression: increase aerobic activity up to 15 min over next 4 weeks by increasing time 2-3 min/week. Progression:  Increase duration to 15 min per mode over the next 4 weeks as tolerated. Progression:  Increase duration to 15 min per mode over the next 4 weeks as tolerated. Progression:  Increase aerobic intensity as tolerated by the pt. Progress slowly. Progression:  Increase time/intensity when RPE <13, and HR is in [facility]   [x] Yes      [] No  Upper and Lower body strength training 2x/wk    Wt: 2#       Reps:  8-15    *Increase wt. after completing 15 reps with an RPE of <12/13. [] Yes      [x] No  Upper and Lower body strength training 2x/wk    Wt: **#       Reps:  8-15    *Increase wt. after completing 15 reps with an RPE of <12/13.  [] Yes      [x] No  Upper and Lower body strength training 2x/wk    Does not attend [facility] [] Yes      [x] No  Pt is not attending on fridays to participate in strength training  Continue Strength Training at home   [] Exercise Log & Strength training handout given     Wt:

## 2023-03-30 ENCOUNTER — TELEMEDICINE (OUTPATIENT)
Dept: FAMILY MEDICINE CLINIC | Age: 63
End: 2023-03-30
Payer: MEDICAID

## 2023-03-30 ENCOUNTER — HOSPITAL ENCOUNTER (OUTPATIENT)
Dept: CARDIAC REHAB | Age: 63
Setting detail: THERAPIES SERIES
Discharge: HOME | End: 2023-03-30

## 2023-03-30 DIAGNOSIS — J40 TRACHEOBRONCHITIS: ICD-10-CM

## 2023-03-30 DIAGNOSIS — J04.0 LARYNGITIS: ICD-10-CM

## 2023-03-30 DIAGNOSIS — J02.0 ACUTE STREPTOCOCCAL PHARYNGITIS: Primary | ICD-10-CM

## 2023-03-30 PROCEDURE — 3017F COLORECTAL CA SCREEN DOC REV: CPT | Performed by: FAMILY MEDICINE

## 2023-03-30 PROCEDURE — 99213 OFFICE O/P EST LOW 20 MIN: CPT | Performed by: FAMILY MEDICINE

## 2023-03-30 PROCEDURE — G8428 CUR MEDS NOT DOCUMENT: HCPCS | Performed by: FAMILY MEDICINE

## 2023-03-30 RX ORDER — BENZONATATE 100 MG/1
100-200 CAPSULE ORAL 3 TIMES DAILY PRN
Qty: 30 CAPSULE | Refills: 0 | Status: SHIPPED | OUTPATIENT
Start: 2023-03-30

## 2023-03-30 ASSESSMENT — ENCOUNTER SYMPTOMS
SORE THROAT: 0
COUGH: 1
GASTROINTESTINAL NEGATIVE: 1
VOICE CHANGE: 1

## 2023-03-30 NOTE — PROGRESS NOTES
Samantha Sandoval (:  1960) is a Established patient, here for evaluation of the following:    Subjective   HPI:   Chief Complaint   Patient presents with    Follow-up    Cough    Hoarse     ED follow up. Recently seen in ED for ST. Dx with strep on 3/19, recently completed abx. ST has resolved. Over the last 4-5 days, now with cough and hoarseness. No fevers. Cough is dry. Patient Active Problem List   Diagnosis    COPD (chronic obstructive pulmonary disease) ([facility]    CAD (coronary artery disease)    Post PTCA with MI    Hyperlipidemia    HTN (hypertension)    Tobacco abuse    Polyp of colon    Chest pain syndrome    Abnormal stress ECG with treadmill    Diabetes mellitus type II, uncontrolled    S/P CABG x 2    Uncontrolled type 2 diabetes mellitus, without long-term current use of insulin    Mild left ventricular systolic dysfunction    Dizzinesses    Angina pectoris, crescendo ([facility]    Hypotension    Ventricular tachycardia     Past Surgical History:   Procedure Laterality Date    APPENDECTOMY      CARDIAC CATHETERIZATION  11    CARDIAC CATHETERIZATION  14    [address] [place] [address]    COLONOSCOPY      CORONARY ANGIOPLASTY WITH STENT PLACEMENT  3/12/10    [facility]    CORONARY ARTERY BYPASS GRAFT  2014    [address] [facility]    VASCULAR SURGERY       Social History     Tobacco Use    Smoking status: Every Day     Packs/day: 0.25     Years: 32.00     Pack years: 8.00     Types: Cigarettes    Smokeless tobacco: Never   Vaping Use    Vaping Use: Never used   Substance Use Topics    Alcohol use: No     Alcohol/week: 0.0 standard drinks    Drug use: No       Review of Systems   Constitutional: Negative. Negative for fever. HENT:  Positive for congestion and voice change. Negative for sore throat. Respiratory:  Positive for cough. Cardiovascular: Negative. Gastrointestinal: Negative. Musculoskeletal: Negative. All other systems reviewed and are negative.        Objective   Patient-Reported Vitals  No

## 2023-04-10 ENCOUNTER — PREP FOR PROCEDURE (OUTPATIENT)
Dept: CARDIOLOGY | Age: 63
End: 2023-04-10

## 2023-04-10 RX ORDER — SODIUM CHLORIDE 0.9 % (FLUSH) 0.9 %
5-40 SYRINGE (ML) INJECTION EVERY 12 HOURS SCHEDULED
Status: CANCELLED | OUTPATIENT
Start: 2023-04-10

## 2023-04-10 RX ORDER — SODIUM CHLORIDE 0.9 % (FLUSH) 0.9 %
5-40 SYRINGE (ML) INJECTION PRN
Status: CANCELLED | OUTPATIENT
Start: 2023-04-10

## 2023-04-10 RX ORDER — SODIUM CHLORIDE 9 MG/ML
INJECTION, SOLUTION INTRAVENOUS PRN
Status: CANCELLED | OUTPATIENT
Start: 2023-04-10

## 2023-04-10 NOTE — DISCHARGE INSTRUCTIONS
PACEMAKER AND DEFIBRILLATOR; PATIENT INSTRUCTIONS AFTER IMPLANTATION    ACTIVITY    1. You should gradually return to your normal activities  2. For the first 4 weeks:               a.  Do not lift more than 10 pounds               b.  Do not swim, golf, bowl, or vacuum. c.  Do not raise your device side arm above your shoulder               d.  At your 2 week follow up visit, ask your doctor which of the above activities you can                    resume    e. ALWAYS avoid any contact sports or hard blows to the chest or abdomen. f.  Avoid sleeping on the left side and face down. 4.  Patients with New Defibrillator (ICD) can drive 1 week after implant  5. You may resume your sexual activity when you feel ready  6. Call the office at [phone] if you have any of the following   - increased swelling    - increased redness   - increased pain   - fever, chills   - Drainage from the site   - IF  [facility] [address] UP\". ESPECIALLY IF YOU CAN SEE THE DEVICE THROUGH THE INCISION, REPORT TO EMERGENCY ROOM IMMEDIATELY    WOUND CARE    Leave safe guard on for 24 hours. Leave dressing below pressure device (safe guard) on an additional 24 hours. Keep your incision dry for at least 7 days, and then you may shower. Sponge bathe around the device insertion site for the first week, keeping the incision dry. Do not apply any ointment, talc, or lotion to the incision. Do not scratch, rub or touch the incision with your hands  Wash  your hands with soap and water for at least 15 seconds before and after touching your incision or dressing. The Steri-Strips (tape strips) will be removed at your follow-up visit, if they have not yet peeled off on their own. Call your doctor immediately if your incision looks red, becomes swollen or tender, or begins to ooze fluid.   Also notify your doctor at once it you develop a temperature greater than 100 degrees Fahrenheit    WHAT TO DO IF YOU GET A SHOCK OR

## 2023-04-11 ENCOUNTER — HOSPITAL ENCOUNTER (OUTPATIENT)
Dept: INPATIENT UNIT | Age: 63
Discharge: HOME | End: 2023-04-11
Attending: INTERNAL MEDICINE | Admitting: INTERNAL MEDICINE
Payer: MEDICAID

## 2023-04-11 ENCOUNTER — APPOINTMENT (OUTPATIENT)
Dept: GENERAL RADIOLOGY | Age: 63
End: 2023-04-11
Attending: INTERNAL MEDICINE
Payer: MEDICAID

## 2023-04-11 VITALS
WEIGHT: 201 LBS | BODY MASS INDEX: 28.14 KG/M2 | RESPIRATION RATE: 24 BRPM | DIASTOLIC BLOOD PRESSURE: 71 MMHG | HEIGHT: 71 IN | HEART RATE: 89 BPM | OXYGEN SATURATION: 96 % | SYSTOLIC BLOOD PRESSURE: 102 MMHG | TEMPERATURE: 98.1 F

## 2023-04-11 PROBLEM — I25.5 ISCHEMIC CARDIOMYOPATHY: Status: ACTIVE | Noted: 2023-04-11

## 2023-04-11 LAB
ANION GAP SERPL CALC-SCNC: 12 MEQ/L (ref 8–16)
APTT PPP: 30.4 SECONDS (ref 22–38)
BUN SERPL-MCNC: 30 MG/DL (ref 7–22)
CALCIUM SERPL-MCNC: 10 MG/DL (ref 8.5–10.5)
CHLORIDE SERPL-SCNC: 102 MEQ/L (ref 98–111)
CO2 SERPL-SCNC: 21 MEQ/L (ref 23–33)
CREAT SERPL-MCNC: 1.1 MG/DL (ref 0.4–1.2)
DEPRECATED RDW RBC AUTO: 52.2 FL (ref 35–45)
ERYTHROCYTE [DISTWIDTH] IN BLOOD BY AUTOMATED COUNT: 14.4 % (ref 11.5–14.5)
GFR SERPL CREATININE-BSD FRML MDRD: > 60 ML/MIN/1.73M2
GLUCOSE SERPL-MCNC: 240 MG/DL (ref 70–108)
HCT VFR BLD AUTO: 45.8 % (ref 42–52)
HGB BLD-MCNC: 14.8 GM/DL (ref 14–18)
INR PPP: 0.91 (ref 0.85–1.13)
MCH RBC QN AUTO: 32 PG (ref 26–33)
MCHC RBC AUTO-ENTMCNC: 32.3 GM/DL (ref 32.2–35.5)
MCV RBC AUTO: 98.9 FL (ref 80–94)
PLATELET # BLD AUTO: 251 THOU/MM3 (ref 130–400)
PMV BLD AUTO: 9.7 FL (ref 9.4–12.4)
POTASSIUM SERPL-SCNC: 4.7 MEQ/L (ref 3.5–5.2)
RBC # BLD AUTO: 4.63 MILL/MM3 (ref 4.7–6.1)
SODIUM SERPL-SCNC: 135 MEQ/L (ref 135–145)
WBC # BLD AUTO: 8.7 THOU/MM3 (ref 4.8–10.8)

## 2023-04-11 PROCEDURE — 93010 ELECTROCARDIOGRAM REPORT: CPT | Performed by: INTERNAL MEDICINE

## 2023-04-11 PROCEDURE — 33249 INSJ/RPLCMT DEFIB W/LEAD(S): CPT | Performed by: INTERNAL MEDICINE

## 2023-04-11 PROCEDURE — 6360000002 HC RX W HCPCS

## 2023-04-11 PROCEDURE — 93005 ELECTROCARDIOGRAM TRACING: CPT | Performed by: INTERNAL MEDICINE

## 2023-04-11 PROCEDURE — 36415 COLL VENOUS BLD VENIPUNCTURE: CPT

## 2023-04-11 PROCEDURE — C1777 LEAD, AICD, ENDO SINGLE COIL: HCPCS

## 2023-04-11 PROCEDURE — 99254 IP/OBS CNSLTJ NEW/EST MOD 60: CPT | Performed by: INTERNAL MEDICINE

## 2023-04-11 PROCEDURE — 71046 X-RAY EXAM CHEST 2 VIEWS: CPT

## 2023-04-11 PROCEDURE — 80048 BASIC METABOLIC PNL TOTAL CA: CPT

## 2023-04-11 PROCEDURE — 85610 PROTHROMBIN TIME: CPT

## 2023-04-11 PROCEDURE — C1894 INTRO/SHEATH, NON-LASER: HCPCS

## 2023-04-11 PROCEDURE — 2580000003 HC RX 258: Performed by: STUDENT IN AN ORGANIZED HEALTH CARE EDUCATION/TRAINING PROGRAM

## 2023-04-11 PROCEDURE — C1722 AICD, SINGLE CHAMBER: HCPCS

## 2023-04-11 PROCEDURE — 2500000003 HC RX 250 WO HCPCS

## 2023-04-11 PROCEDURE — 85027 COMPLETE CBC AUTOMATED: CPT

## 2023-04-11 PROCEDURE — 85730 THROMBOPLASTIN TIME PARTIAL: CPT

## 2023-04-11 PROCEDURE — C1898 LEAD, PMKR, OTHER THAN TRANS: HCPCS

## 2023-04-11 PROCEDURE — 33249 INSJ/RPLCMT DEFIB W/LEAD(S): CPT

## 2023-04-11 PROCEDURE — 6360000002 HC RX W HCPCS: Performed by: STUDENT IN AN ORGANIZED HEALTH CARE EDUCATION/TRAINING PROGRAM

## 2023-04-11 PROCEDURE — C1892 INTRO/SHEATH,FIXED,PEEL-AWAY: HCPCS

## 2023-04-11 RX ORDER — SODIUM CHLORIDE 0.9 % (FLUSH) 0.9 %
5-40 SYRINGE (ML) INJECTION PRN
Status: DISCONTINUED | OUTPATIENT
Start: 2023-04-11 | End: 2023-04-11 | Stop reason: HOSPADM

## 2023-04-11 RX ORDER — SODIUM CHLORIDE 9 MG/ML
INJECTION, SOLUTION INTRAVENOUS PRN
Status: DISCONTINUED | OUTPATIENT
Start: 2023-04-11 | End: 2023-04-11 | Stop reason: HOSPADM

## 2023-04-11 RX ORDER — SODIUM CHLORIDE 0.9 % (FLUSH) 0.9 %
5-40 SYRINGE (ML) INJECTION EVERY 12 HOURS SCHEDULED
Status: DISCONTINUED | OUTPATIENT
Start: 2023-04-11 | End: 2023-04-11 | Stop reason: HOSPADM

## 2023-04-11 RX ADMIN — VANCOMYCIN HYDROCHLORIDE 250 MG: 1 INJECTION, POWDER, LYOPHILIZED, FOR SOLUTION INTRAVENOUS at 08:43

## 2023-04-11 RX ADMIN — Medication 2000 MG: at 07:02

## 2023-04-11 RX ADMIN — SODIUM CHLORIDE: 9 INJECTION, SOLUTION INTRAVENOUS at 06:24

## 2023-04-11 NOTE — PROCEDURES
cannulated x2 under ultrasound guidance using micro-puncture needle and 2 J-tip 0.035\" guidewires were exchanged for micro guidewires and advanced into IVC. A 4 cm long incision was made in the left pectoral region and a subcutaneous pocket was made. Two hemostatic peel away sheaths were advanced into the axillary/subclavian vein over the guide wires. Through 9-Czech sheath AICD lead was inserted and advanced under fluoroscopic guidance and positioned into the right ventricular apex after mapping several area in apical and apical-septal region (relatively low R wave sensing). In an identical fashion, the right atrial lead was advanced through 7-Czech sheath and positioned into the right atrial appendage. The electric parameters of the leads were checked using external analyzing system and noted to be adequate. High output pacing with 10 volts was performed with both the leads and there was no phrenic nerve capture. The leads were secured to the pectoral fascia using 0 Ethibond over the suture sleeves. The pocket was thoroughly irrigated with antibiotic solution. The leads were attached to the pulse generator and lead parameters were tested one more time. The leads were wrapped around the device and the device was placed inside the pocket. The pocket was closed in 3 layers using, 2-0, 3-0 and 4-0 Vicryl sutures. Steri-strips were applied to incision and site covered by Safeguard pressure dressing. The fluoroscopy of the chest showed no pneumothorax or pericardial or pleural effusion. The patient's hemodynamics were monitored throughout the procedure. He tolerated the procedure well. Medications:  Midazolam 3 mg intravenously. Fentanyl 150 mcg intravenously. Cefazolin 2 gm intravenously for surgical prophylaxis. Lidocaine/Marcaine 30 cc ID/SQ for local anaesthesia. Fluoroscopic time:   3.1 minutes. Blood loss:  Minimal.    Complications:  None.       Device details:  Pulse generator: [place],

## 2023-04-11 NOTE — PRE SEDATION
Sedation Pre-Procedure Note    Patient Name: Kendra Hobson   YOB: 1960  Room/Bed: -16/016-A  Medical Record Number: [id number]  Date: 4/11/2023   Time: 7:42 AM       Indication:  Dual chamber AICD implantation for primary prevention of SCD. Consent: The risk and benefits of AICD implantation including pneumothorax, hemothorax, bleeding, vascular complications, infection, pericardial tamponade requiring emergency pericardiocentesis, MI, death, exposure to radiation, lead dislodgement requiring reposition, future operations for generator change or device revision or upgrade were discussed with the patient. He verbalized understanding of the discussion. His questions were answered. The patient wishes to proceed. Vital Signs:   Vitals:    04/11/23 0611   BP: 93/66   Pulse: 87   Resp: 16   Temp:    SpO2:        Past Medical History:   has a past medical history of CAD (coronary artery disease), CHF (congestive heart failure) ([facility] COPD (chronic obstructive pulmonary disease) ([facility] Diabetes mellitus ([facility] Hyperlipidemia, PONV (postoperative nausea and vomiting), and Sleep apnea. Past Surgical History:   has a past surgical history that includes Cardiac catheterization (9/2/11); Coronary angioplasty with stent (3/12/10); Appendectomy; Colonoscopy; Cardiac catheterization (6/12/14); vascular surgery; and Coronary artery bypass graft (2014). Medications:   Scheduled Meds:    sodium chloride flush  5-40 mL IntraVENous 2 times per day    vancomycin irrigation  250 mg Irrigation Once     Continuous Infusions:    sodium chloride 75 mL/hr at 04/11/23 0624     PRN Meds: sodium chloride flush, sodium chloride  Home Meds:   Prior to Admission medications    Medication Sig Start Date End Date Taking?  Authorizing Provider   benzonatate (TESSALON) 100 MG capsule Take 1-2 capsules by mouth 3 times daily as needed for Cough 3/30/23   DO MORALES Kraft 24-26 MG per tablet TAKE 1 TABLET

## 2023-04-11 NOTE — PLAN OF CARE
Problem: Chronic Conditions and Co-morbidities  Goal: Patient's chronic conditions and co-morbidity symptoms are monitored and maintained or improved  Outcome: Progressing  Flowsheets (Taken 4/11/2023 0610)  Care Plan - Patient's Chronic Conditions and Co-Morbidity Symptoms are Monitored and Maintained or Improved: Monitor and assess patient's chronic conditions and comorbid symptoms for stability, deterioration, or improvement     Problem: Discharge Planning  Goal: Discharge to home or other facility with appropriate resources  Outcome: Progressing  Flowsheets (Taken 4/11/2023 0620)  Discharge to home or other facility with appropriate resources:   Identify barriers to discharge with patient and caregiver   Identify discharge learning needs (meds, wound care, etc)   Refer to discharge planning if patient needs post-hospital services based on physician order or complex needs related to functional status, cognitive ability or social support system   Care plan reviewed with patient and wife. Patient and wife verbalize understanding of the plan of care and contribute to goal setting.

## 2023-04-11 NOTE — PROGRESS NOTES
9621  Pt admitted to  2E16 ambulatory for ICD implant. Pt NPO. Patient accompanied by wife. Vital signs obtained. Assessment and data collection initiated. Oriented to room. Policies and procedures for 2E explained   All questions answered with no further questions at this time. Fall prevention and safety precautions discussed with patient. Pt with life vest on - states that \"this thing\" is coming off right now. Pt instructed that device is to remain on - compliant at this point. 7194  Dr Cecelia Dial in to meet pt  0725  Pt to cath lab per bed  0911  Pt ret'd to 2E post dual lead ICD implant. Left upper chest drsg D/I. Cool Safe guard in place. Pt denies any pain. IV 0.9NS cont'd. Family @ bedside. Sling given to pt per cath lab staff with instructions that he can apply it at night to help him remember not to raise arm above the level of his shoulder at the pts request and per instructions of Dr Cecelia Dial  Wife given pacer booklet with temporary ID card. Wife given life vest unit including battery. Pt taking fluids - aiden well. Wife going out to get pt breakfast.  1000  Pt taking diet. 1100  30 ml fluid removed from safe guard cool. Pt assisted to bathroom to vd. Pt amb in [place] - aiden well. Ret'd to room, resting on edge of bed. Pt notes discomfort of a \"2\" in procedural area,   1105 - 1115  Pt to radiology for CXR and ret'd. Dr Cecelia Dial in to see pt.  1  Dr Chrissie Swanson to review chest films, order received  Pacer rep at bedside for device check. 1135  IV site discont'd. Telemetry discont'd. Discharge instructions reviewed with pt and wife - they voice understanding r/t f/u appointment,how to care for procedure site and activity restrictions  1150  Pt discharged per w/c for transport home with daughter and wife via central transport.

## 2023-04-11 NOTE — BRIEF OP NOTE
Brief Postoperative Note    Date:   4/11/2023  Patient name: Esther Reed  YOB: 1960  Sex: male   MRN:   [id number]    PCP: Rdaha Linn DO     Procedure:Dual Chamber AICD implantation    Pre-Op Diagnosis: ICM    Post-Op Diagnosis: Same    Surgeon: Kimberlee Cabrales MD, MRCP, [place], [place]    Assistant: Lauren Jennings    Anesthesia/sedation:  Local lidocaine/fentanyl and midazolam as needed. Estimated Blood Loss (mL): Minimal    Complications: None    Recommendations:  See Epic orders. Bed rest for 2 hours. Keep pocket site dry. No shower for 7 days ((sponge bath and tub bath OK). ICE packs locally. Monitor site for bleeding or swelling. Pressure dressing x 24 hours. Chest x-ray PA and lateral views. Follow up in device clinic in 1 week.        Electronically signed by Claudene Moody, MD, Charu Brownlee on 4/11/2023 at 8:56 AM

## 2023-04-11 NOTE — PLAN OF CARE
Problem: Chronic Conditions and Co-morbidities  Goal: Patient's chronic conditions and co-morbidity symptoms are monitored and maintained or improved  4/11/2023 1146 by Pearl Santana RN  Outcome: Completed  4/11/2023 0644 by Pearl Santana RN  Outcome: Progressing  Flowsheets (Taken 4/11/2023 0610)  Care Plan - Patient's Chronic Conditions and Co-Morbidity Symptoms are Monitored and Maintained or Improved: Monitor and assess patient's chronic conditions and comorbid symptoms for stability, deterioration, or improvement     Problem: Discharge Planning  Goal: Discharge to home or other facility with appropriate resources  4/11/2023 1146 by Pearl Santana RN  Outcome: Completed  4/11/2023 0644 by Pearl Santana RN  Outcome: Progressing  Flowsheets (Taken 4/11/2023 0620)  Discharge to home or other facility with appropriate resources:   Identify barriers to discharge with patient and caregiver   Identify discharge learning needs (meds, wound care, etc)   Refer to discharge planning if patient needs post-hospital services based on physician order or complex needs related to functional status, cognitive ability or social support system     Problem: Pain  Goal: Verbalizes/displays adequate comfort level or baseline comfort level  Outcome: Completed       Discharge home

## 2023-04-11 NOTE — CONSULTS
[address] (RG) [address] [facility]  Dept: [phone]  Cardiac Electrophysiology: Consultation Note  Patient's demographics:  Date:   4/11/2023  Patient name:              Sherrell Brennan  YOB: 1960  Sex:    male   MRN:   [id number]    Primary Care Physician:  Pato Taylor DO    Cardiologist:  Lilliam Rodriguez MD    Reason for Consultation:  ICM/VT. Evaluation for AICD implantation. Clinical Summary:  Lucian Martin is a very pleasant 64/gentleman with history of coronary artery disease, prior MI/PCI and CABG (2010) presented to the emergency room in 10/20/2022 with feeling unwell and hot. He was noted to be in ventricular tachycardia and underwent emergent synchronized cardioversion. Cardiac catheterization showed severe disease of cefazolin as a graft to left circumflex and underwent complex PCI to left circumflex. His echocardiogram showed new drop in his LVEF from 45% to 20 to 35%. He was started on appropriate heart failure medications and has been making life vest since then. His most recent echocardiogram showed unchanged LVEF. Implantation of AICD for primary prevention of sudden cardiac death was recommended. Reports doing well today. Denies palpitations, shocks from the left breast, chest pain or lower extremity swelling. He has exertional shortness of breath, chronic due to COPD. He is compliant with medications including Farxiga, metoprolol succinate and Entresto. Continues to smoke.  with children. Retired. Review of systems:  Constitutional: Negative for chills and fever  HENT: Negative for congestion, sinus pressure, sneezing and sore throat. Eyes: Negative for pain, discharge, redness and itching.    Respiratory: Negative for apnea, cough  Gastrointestinal: Negative for blood in stool, constipation, diarrhea   Endocrine: Negative for cold intolerance, heat intolerance,

## 2023-04-17 ENCOUNTER — OFFICE VISIT (OUTPATIENT)
Dept: CARDIOLOGY CLINIC | Age: 63
End: 2023-04-17
Payer: MEDICAID

## 2023-04-17 VITALS
WEIGHT: 185 LBS | HEART RATE: 83 BPM | RESPIRATION RATE: 20 BRPM | BODY MASS INDEX: 25.9 KG/M2 | DIASTOLIC BLOOD PRESSURE: 67 MMHG | HEIGHT: 71 IN | SYSTOLIC BLOOD PRESSURE: 104 MMHG

## 2023-04-17 DIAGNOSIS — Z95.810 S/P ICD (INTERNAL CARDIAC DEFIBRILLATOR) PROCEDURE: Primary | ICD-10-CM

## 2023-04-17 DIAGNOSIS — I10 PRIMARY HYPERTENSION: ICD-10-CM

## 2023-04-17 DIAGNOSIS — E78.5 HYPERLIPIDEMIA LDL GOAL <70: ICD-10-CM

## 2023-04-17 DIAGNOSIS — I25.5 ISCHEMIC CARDIOMYOPATHY: ICD-10-CM

## 2023-04-17 DIAGNOSIS — I25.10 CORONARY ARTERY DISEASE INVOLVING NATIVE CORONARY ARTERY OF NATIVE HEART WITHOUT ANGINA PECTORIS: ICD-10-CM

## 2023-04-17 PROCEDURE — 93000 ELECTROCARDIOGRAM COMPLETE: CPT | Performed by: INTERNAL MEDICINE

## 2023-04-17 PROCEDURE — G8419 CALC BMI OUT NRM PARAM NOF/U: HCPCS | Performed by: NURSE PRACTITIONER

## 2023-04-17 PROCEDURE — 99214 OFFICE O/P EST MOD 30 MIN: CPT | Performed by: NURSE PRACTITIONER

## 2023-04-17 PROCEDURE — 4004F PT TOBACCO SCREEN RCVD TLK: CPT | Performed by: NURSE PRACTITIONER

## 2023-04-17 PROCEDURE — G8427 DOCREV CUR MEDS BY ELIG CLIN: HCPCS | Performed by: NURSE PRACTITIONER

## 2023-04-17 PROCEDURE — 3078F DIAST BP <80 MM HG: CPT | Performed by: NURSE PRACTITIONER

## 2023-04-17 PROCEDURE — 3017F COLORECTAL CA SCREEN DOC REV: CPT | Performed by: NURSE PRACTITIONER

## 2023-04-17 PROCEDURE — 3074F SYST BP LT 130 MM HG: CPT | Performed by: NURSE PRACTITIONER

## 2023-04-17 ASSESSMENT — ENCOUNTER SYMPTOMS
RESPIRATORY NEGATIVE: 1
GASTROINTESTINAL NEGATIVE: 1

## 2023-04-18 ENCOUNTER — TELEPHONE (OUTPATIENT)
Dept: CARDIOLOGY CLINIC | Age: 63
End: 2023-04-18

## 2023-04-18 NOTE — TELEPHONE ENCOUNTER
New Medtronic dual ICD     No clinic appt scheduled ~No f/u with Dr Bobbette Merlin until next spring  I got an email from [employer] [facility] that states \"patient will work with clinic\" so I am guessing he didn't want a home monitor, he doesn't have anything ordered on the website    Would you be able to schedule an in office and clarify if he plans on getting a monitor? Thanks!

## 2023-04-18 NOTE — TELEPHONE ENCOUNTER
PATIENT WAS SEEN IN OFFICE 04/17/2023. I SPOKE WITH KHANG AND HE IS TRYING THE ROSELIA. HE WILL ORDER MONITOR Thursday IF ROSELIA FAILS.

## 2023-04-21 NOTE — TELEPHONE ENCOUNTER
Discussed with Rita Scott at Dr Kaycee Vincent office. Sarina Bhakta is a [employer]akim implant/Matthew f/u pt. We will do an initial wound check/ICD check next Tuesday here  (pt is aware) further in office f/u will be in Dr Kaycee Vincent office.   Pt has carelink monitor, new schedule mailed to pt

## 2023-04-23 LAB
EKG ATRIAL RATE: 89 BPM
EKG P AXIS: 56 DEGREES
EKG P-R INTERVAL: 160 MS
EKG Q-T INTERVAL: 362 MS
EKG QRS DURATION: 86 MS
EKG QTC CALCULATION (BAZETT): 440 MS
EKG R AXIS: 100 DEGREES
EKG T AXIS: 4 DEGREES
EKG VENTRICULAR RATE: 89 BPM

## 2023-04-25 ENCOUNTER — NURSE ONLY (OUTPATIENT)
Dept: CARDIOLOGY CLINIC | Age: 63
End: 2023-04-25
Payer: MEDICAID

## 2023-04-25 DIAGNOSIS — L08.9 WOUND INFECTION: Primary | ICD-10-CM

## 2023-04-25 DIAGNOSIS — T14.8XXA WOUND INFECTION: Primary | ICD-10-CM

## 2023-04-25 DIAGNOSIS — Z95.810 S/P ICD (INTERNAL CARDIAC DEFIBRILLATOR) PROCEDURE: ICD-10-CM

## 2023-04-25 PROCEDURE — 93289 INTERROG DEVICE EVAL HEART: CPT | Performed by: INTERNAL MEDICINE

## 2023-04-25 RX ORDER — CEPHALEXIN 500 MG/1
500 CAPSULE ORAL 3 TIMES DAILY
Qty: 21 CAPSULE | Refills: 0 | Status: SHIPPED | OUTPATIENT
Start: 2023-04-25 | End: 2023-05-02

## 2023-04-25 NOTE — PROGRESS NOTES
In Office Medtronic Dual Pacemaker   Patient of [address]    Implanted 4/11/23  Seen in office 4/17/23, but device was not interrogated. Incision assessed. Scabbed with redness. Tender to the touch. Cleaned and picture taken. Dr Eual Sicard into assess. Redressed with steri strips. Keflex script sent for 7 days. Appt scheduled for 1 week to reassess.      Battery 13 years    Presenting rhythm AS VS    A Impedance 475  RV Impedance 380    P wave sensing 3.3  R wave sensing 6    A Threshold 0.5 @ 0.40  A Amplitude 3.5 @ 0.40  RV Thresholds 1.0 @ 0.40  RV Amplitude 3.5 @ 0.40      A Paced 1.8%  V Paced <0.1%    Programmed Mode AAI <=> DDD       Afib [place] 0%    Episodes   none

## 2023-04-26 ENCOUNTER — NURSE ONLY (OUTPATIENT)
Dept: LAB | Age: 63
End: 2023-04-26

## 2023-04-26 DIAGNOSIS — E11.65 UNCONTROLLED TYPE 2 DIABETES MELLITUS WITH HYPERGLYCEMIA, WITHOUT LONG-TERM CURRENT USE OF INSULIN (HCC): ICD-10-CM

## 2023-04-26 DIAGNOSIS — T14.8XXA WOUND INFECTION: ICD-10-CM

## 2023-04-26 DIAGNOSIS — L08.9 WOUND INFECTION: ICD-10-CM

## 2023-04-26 LAB
DEPRECATED MEAN GLUCOSE BLD GHB EST-ACNC: 192 MG/DL (ref 70–126)
DEPRECATED RDW RBC AUTO: 53.5 FL (ref 35–45)
ERYTHROCYTE [DISTWIDTH] IN BLOOD BY AUTOMATED COUNT: 14.5 % (ref 11.5–14.5)
ERYTHROCYTE [SEDIMENTATION RATE] IN BLOOD BY WESTERGREN METHOD: 4 MM/HR (ref 0–10)
HBA1C MFR BLD HPLC: 8.4 % (ref 4.4–6.4)
HCT VFR BLD AUTO: 48.6 % (ref 42–52)
HGB BLD-MCNC: 15.5 GM/DL (ref 14–18)
MCH RBC QN AUTO: 32 PG (ref 26–33)
MCHC RBC AUTO-ENTMCNC: 31.9 GM/DL (ref 32.2–35.5)
MCV RBC AUTO: 100.2 FL (ref 80–94)
PLATELET # BLD AUTO: 229 THOU/MM3 (ref 130–400)
PMV BLD AUTO: 10.1 FL (ref 9.4–12.4)
RBC # BLD AUTO: 4.85 MILL/MM3 (ref 4.7–6.1)
WBC # BLD AUTO: 7.9 THOU/MM3 (ref 4.8–10.8)

## 2023-05-02 ENCOUNTER — NURSE ONLY (OUTPATIENT)
Dept: CARDIOLOGY CLINIC | Age: 63
End: 2023-05-02

## 2023-05-02 RX ORDER — CEPHALEXIN 500 MG/1
500 CAPSULE ORAL 3 TIMES DAILY
Qty: 21 CAPSULE | Refills: 0 | OUTPATIENT
Start: 2023-05-02 | End: 2023-05-09

## 2023-05-02 NOTE — PROGRESS NOTES
Pt here for 2nd incision check. Pt completed 7 days of keflex as of this morning. Implant 4/11/23    Scab dissolved since previous check. Incision with pus from medial end. Picture taken. Incision cleaned. New picture taken. New steri strips applied. Pictures reviewed with Dr Syed Peterson. Pt may shower. Called in 7 more days of Keflex 500 mg TID.

## 2023-05-10 ENCOUNTER — NURSE ONLY (OUTPATIENT)
Dept: CARDIOLOGY CLINIC | Age: 63
End: 2023-05-10
Payer: MEDICAID

## 2023-05-10 DIAGNOSIS — Z95.810 ICD (IMPLANTABLE CARDIOVERTER-DEFIBRILLATOR) IN PLACE: ICD-10-CM

## 2023-05-10 PROCEDURE — 93283 PRGRMG EVAL IMPLANTABLE DFB: CPT | Performed by: INTERNAL MEDICINE

## 2023-05-10 NOTE — PROGRESS NOTES
Medtronic dual ICD in office     Steri strips removed, nadja in to assess pocket. Has finished 2nd round of antibiotics. Aware, keep eye on pocket/incision. If any issues to call office. Steri strips replaced. He's aware to let them fall off on their own. Further f/u will be with Matthew's office     . Reagan Arthur Battery longevity:  13 years on device   Presenting rhythm  AS VS     Atrial impedance 418  RV impedance 380  Shock 55    P wave sensing 3.1  R wave sensing 7.9    2.2 % atrial paced  0 % RV paced     Atrial threshold 0.5 V  at 0.4ms  RV threshold 0.5 V at 0.4ms  Mode switches   0

## 2023-05-15 ENCOUNTER — OFFICE VISIT (OUTPATIENT)
Dept: FAMILY MEDICINE CLINIC | Age: 63
End: 2023-05-15
Payer: MEDICAID

## 2023-05-15 VITALS
DIASTOLIC BLOOD PRESSURE: 64 MMHG | HEART RATE: 66 BPM | WEIGHT: 200.2 LBS | RESPIRATION RATE: 18 BRPM | SYSTOLIC BLOOD PRESSURE: 124 MMHG | BODY MASS INDEX: 28.03 KG/M2 | HEIGHT: 71 IN

## 2023-05-15 DIAGNOSIS — I47.20 VENTRICULAR TACHYARRHYTHMIA (HCC): ICD-10-CM

## 2023-05-15 DIAGNOSIS — J44.9 CHRONIC OBSTRUCTIVE PULMONARY DISEASE, UNSPECIFIED COPD TYPE (HCC): ICD-10-CM

## 2023-05-15 DIAGNOSIS — I10 ESSENTIAL HYPERTENSION: ICD-10-CM

## 2023-05-15 DIAGNOSIS — E78.00 PURE HYPERCHOLESTEROLEMIA: ICD-10-CM

## 2023-05-15 DIAGNOSIS — Z95.1 S/P CABG X 2: ICD-10-CM

## 2023-05-15 DIAGNOSIS — I25.10 CORONARY ARTERY DISEASE INVOLVING NATIVE CORONARY ARTERY OF NATIVE HEART WITHOUT ANGINA PECTORIS: ICD-10-CM

## 2023-05-15 DIAGNOSIS — Z72.0 TOBACCO ABUSE: ICD-10-CM

## 2023-05-15 DIAGNOSIS — E11.65 UNCONTROLLED TYPE 2 DIABETES MELLITUS WITH HYPERGLYCEMIA, WITHOUT LONG-TERM CURRENT USE OF INSULIN (HCC): Primary | ICD-10-CM

## 2023-05-15 DIAGNOSIS — Z12.5 SCREENING FOR PROSTATE CANCER: ICD-10-CM

## 2023-05-15 PROBLEM — J40 BRONCHITIS: Status: ACTIVE | Noted: 2022-02-16

## 2023-05-15 PROBLEM — F17.200 TOBACCO DEPENDENCE SYNDROME: Status: ACTIVE | Noted: 2022-07-18

## 2023-05-15 PROCEDURE — G8427 DOCREV CUR MEDS BY ELIG CLIN: HCPCS | Performed by: FAMILY MEDICINE

## 2023-05-15 PROCEDURE — 3017F COLORECTAL CA SCREEN DOC REV: CPT | Performed by: FAMILY MEDICINE

## 2023-05-15 PROCEDURE — 99214 OFFICE O/P EST MOD 30 MIN: CPT | Performed by: FAMILY MEDICINE

## 2023-05-15 PROCEDURE — 2022F DILAT RTA XM EVC RTNOPTHY: CPT | Performed by: FAMILY MEDICINE

## 2023-05-15 PROCEDURE — 3023F SPIROM DOC REV: CPT | Performed by: FAMILY MEDICINE

## 2023-05-15 PROCEDURE — 3078F DIAST BP <80 MM HG: CPT | Performed by: FAMILY MEDICINE

## 2023-05-15 PROCEDURE — 4004F PT TOBACCO SCREEN RCVD TLK: CPT | Performed by: FAMILY MEDICINE

## 2023-05-15 PROCEDURE — 3052F HG A1C>EQUAL 8.0%<EQUAL 9.0%: CPT | Performed by: FAMILY MEDICINE

## 2023-05-15 PROCEDURE — G8419 CALC BMI OUT NRM PARAM NOF/U: HCPCS | Performed by: FAMILY MEDICINE

## 2023-05-15 PROCEDURE — 3074F SYST BP LT 130 MM HG: CPT | Performed by: FAMILY MEDICINE

## 2023-05-15 ASSESSMENT — PATIENT HEALTH QUESTIONNAIRE - PHQ9
SUM OF ALL RESPONSES TO PHQ QUESTIONS 1-9: 0
SUM OF ALL RESPONSES TO PHQ QUESTIONS 1-9: 0
1. LITTLE INTEREST OR PLEASURE IN DOING THINGS: 0
SUM OF ALL RESPONSES TO PHQ9 QUESTIONS 1 & 2: 0
2. FEELING DOWN, DEPRESSED OR HOPELESS: 0
SUM OF ALL RESPONSES TO PHQ QUESTIONS 1-9: 0
SUM OF ALL RESPONSES TO PHQ QUESTIONS 1-9: 0

## 2023-05-15 ASSESSMENT — ENCOUNTER SYMPTOMS
GASTROINTESTINAL NEGATIVE: 1
RESPIRATORY NEGATIVE: 1

## 2023-05-15 NOTE — PATIENT INSTRUCTIONS
You may receive a survey about your visit with us today. The feedback from our patients helps us identify what is working well and where the service to all patients can be enhanced. Thank you! Tobacco Cessation Programs     Telephonic behavior modification  1-800-QUIT-NOW (867-4417)  Counseling service for those who are ready to quit using tobacco.    Available for uninsured [place] residents, Medicaid recipients, pregnant women, or patients whose health plans or employers are members of the [address] [place] [address] behavior modification  http://[place]. Quitlogix. org  Online support program to help patients through each step of the quitting process. Available 24 hours a day 7 days a week. Provides up to date researched based tool, step-by-step guides, and motivational messages. Online behavior modification  www.lungusa.org/stop-smoking/how-to-quit  HelpLine: 1-800-LUNG-USA (493-2199)  Email questions to:  eDepti@mobli. [employer]   Website offers resources to help tobacco users figure out their reasons for quitting and then take the big step of quitting for good. Hypnosis  Location: [address] [place] [address]  Contact: Keesha Vora, PhD at [phone]  Hypnosis for tobacco cessation  Cost $225 for the initial session and $175 for each session afterwards. Most patients require 6-8 sessions. There is the option to submit through the patients insurance. Hypnosis and behavior modification  Location: [place] [address],  [facility] [address], [place]  Contact: Jg Velasco, PhD at [phone]  Counseling and hypnosis for nicotine addition  Cost: For uninsured patients:  Please call above phone number  Cost for insured patients depends on patients insurance plan.     Behavior modification  Location: [facility], [address] [facility] [address]: [phone]  Services include four one-on-one appointments between the patient and a respiratory therapist.  The four

## 2023-05-15 NOTE — PROGRESS NOTES
Tank Jung (:  1960) is a 61 y.o. male,Established patient, here for evaluation of the following chief complaint(s):  Diabetes (3 month follow up )          Subjective   SUBJECTIVE/OBJECTIVE:  HPI  Chief Complaint   Patient presents with    Diabetes     3 month follow up      3 month eval.    Recently had ICD placed. Doing well in this regard. Sugar slowly improving. Placed on [address] by Cardio, already on Jardiance.     Lab Results   Component Value Date    LABA1C 8.4 (H) 2023    LABA1C 8.5 (H) 02/10/2023    LABA1C 8.7 (H) 10/29/2022     Lab Results   Component Value Date    GLUF 142 (H) 2017    LABMICR 17.80 2021    LDLCALC 163 2021    CREATININE 1.1 2023     BP Readings from Last 3 Encounters:   05/15/23 124/64   23 104/67   23 102/71     Wt Readings from Last 3 Encounters:   05/15/23 200 lb 3.2 oz (90.8 kg)   23 185 lb (83.9 kg)   23 201 lb (91.2 kg)       Patient Active Problem List   Diagnosis    COPD (chronic obstructive pulmonary disease) (HCC)    CAD (coronary artery disease)    Post PTCA with MI    Hyperlipidemia    HTN (hypertension)    Tobacco abuse    Polyp of colon    Chest pain syndrome    Abnormal stress ECG with treadmill    Diabetes mellitus type II, uncontrolled    S/P CABG x 2    Uncontrolled type 2 diabetes mellitus, without long-term current use of insulin    Mild left ventricular systolic dysfunction    Dizzinesses    Angina pectoris, crescendo (HCC)    Hypotension    Ventricular tachycardia (HCC)    Ischemic cardiomyopathy    Medtronic dual ICD    Bronchitis    Tobacco dependence syndrome       Current Outpatient Medications   Medication Sig Dispense Refill    ENTRESTO 24-26 MG per tablet TAKE 1 TABLET BY MOUTH TWICE DAILY      bumetanide (BUMEX) 1 MG tablet TAKE 1 TABLET BY MOUTH ONCE DAILY      albuterol (PROVENTIL) (2.5 MG/3ML) 0.083% nebulizer solution Take 3 mLs by nebulization every 6 hours as needed for Wheezing

## 2023-07-03 RX ORDER — EMPAGLIFLOZIN AND METFORMIN HYDROCHLORIDE 5; 1000 MG/1; MG/1
TABLET ORAL
Qty: 60 TABLET | Refills: 5 | Status: SHIPPED | OUTPATIENT
Start: 2023-07-03

## 2023-07-28 ENCOUNTER — PROCEDURE VISIT (OUTPATIENT)
Dept: CARDIOLOGY CLINIC | Age: 63
End: 2023-07-28

## 2023-07-28 DIAGNOSIS — Z95.810 ICD (IMPLANTABLE CARDIOVERTER-DEFIBRILLATOR) IN PLACE: Primary | ICD-10-CM

## 2023-07-28 NOTE — PROGRESS NOTES
[facility] medtronic dual ICD     6/17/23 8 beats VT   5/22/23  15 beats VT     . [place] Nolasco Battery longevity:  12.7 years   Presenting rhythm  AS VS     Atrial impedance 418  RV impedance 380  Shock 59    P wave sensing 1.5  R wave sensing 4.5    3.1 % atrial paced  0.1 % RV paced     Atrial threshold 0.5 V  at 0.4ms  RV threshold 0.75 V at 0.4ms  Mode switches   0  Optivol WNL

## 2023-08-29 ENCOUNTER — OFFICE VISIT (OUTPATIENT)
Dept: FAMILY MEDICINE CLINIC | Age: 63
End: 2023-08-29
Payer: MEDICAID

## 2023-08-29 VITALS
SYSTOLIC BLOOD PRESSURE: 124 MMHG | BODY MASS INDEX: 28.57 KG/M2 | RESPIRATION RATE: 16 BRPM | HEART RATE: 68 BPM | WEIGHT: 202 LBS | DIASTOLIC BLOOD PRESSURE: 76 MMHG

## 2023-08-29 DIAGNOSIS — L30.9 DERMATITIS: Primary | ICD-10-CM

## 2023-08-29 PROCEDURE — 99213 OFFICE O/P EST LOW 20 MIN: CPT | Performed by: FAMILY MEDICINE

## 2023-08-29 PROCEDURE — G8427 DOCREV CUR MEDS BY ELIG CLIN: HCPCS | Performed by: FAMILY MEDICINE

## 2023-08-29 PROCEDURE — 3074F SYST BP LT 130 MM HG: CPT | Performed by: FAMILY MEDICINE

## 2023-08-29 PROCEDURE — 4004F PT TOBACCO SCREEN RCVD TLK: CPT | Performed by: FAMILY MEDICINE

## 2023-08-29 PROCEDURE — 3078F DIAST BP <80 MM HG: CPT | Performed by: FAMILY MEDICINE

## 2023-08-29 PROCEDURE — 3017F COLORECTAL CA SCREEN DOC REV: CPT | Performed by: FAMILY MEDICINE

## 2023-08-29 PROCEDURE — G8419 CALC BMI OUT NRM PARAM NOF/U: HCPCS | Performed by: FAMILY MEDICINE

## 2023-08-29 ASSESSMENT — PATIENT HEALTH QUESTIONNAIRE - PHQ9
1. LITTLE INTEREST OR PLEASURE IN DOING THINGS: 0
SUM OF ALL RESPONSES TO PHQ QUESTIONS 1-9: 0
SUM OF ALL RESPONSES TO PHQ QUESTIONS 1-9: 0
SUM OF ALL RESPONSES TO PHQ9 QUESTIONS 1 & 2: 0
SUM OF ALL RESPONSES TO PHQ QUESTIONS 1-9: 0
SUM OF ALL RESPONSES TO PHQ QUESTIONS 1-9: 0
2. FEELING DOWN, DEPRESSED OR HOPELESS: 0

## 2023-08-29 ASSESSMENT — ENCOUNTER SYMPTOMS
RESPIRATORY NEGATIVE: 1
GASTROINTESTINAL NEGATIVE: 1

## 2023-08-29 NOTE — PROGRESS NOTES
Rupal Gonzalez (:  1960) is a 61 y.o. male,Established patient, here for evaluation of the following chief complaint(s):  Rash (Right upper arm x 1 week, no improvement with aloe)          Subjective   SUBJECTIVE/OBJECTIVE:  HPI  Chief Complaint   Patient presents with    Rash     Right upper arm x 1 week, no improvement with aloe     Pt presents today with a rash that started near his right shoulder and is now heading down the arm. The rash does not hurt or itch. No drainage. Using Dial soap, no recent change. Patient Active Problem List   Diagnosis    COPD (chronic obstructive pulmonary disease) (HCC)    CAD (coronary artery disease)    Post PTCA with MI    Hyperlipidemia    HTN (hypertension)    Tobacco abuse    Polyp of colon    Chest pain syndrome    Abnormal stress ECG with treadmill    Diabetes mellitus type II, uncontrolled    S/P CABG x 2    Uncontrolled type 2 diabetes mellitus, without long-term current use of insulin    Mild left ventricular systolic dysfunction    Dizzinesses    Angina pectoris, crescendo (HCC)    Hypotension    Ventricular tachycardia (HCC)    Ischemic cardiomyopathy    Medtronic dual ICD    Bronchitis    Tobacco dependence syndrome       Current Outpatient Medications   Medication Sig Dispense Refill    fluocinonide (LIDEX) 0.05 % cream Apply topically 2 times daily.  30 g 0    linagliptin (TRADJENTA) 5 MG tablet Take 1 tablet by mouth daily 90 tablet 3    Empagliflozin-metFORMIN HCl (SYNJARDY) 5-1000 MG TABS Take 1 tablet by mouth twice daily 60 tablet 5    ENTRESTO 24-26 MG per tablet TAKE 1 TABLET BY MOUTH TWICE DAILY      bumetanide (BUMEX) 1 MG tablet TAKE 1 TABLET BY MOUTH ONCE DAILY      albuterol (PROVENTIL) (2.5 MG/3ML) 0.083% nebulizer solution Take 3 mLs by nebulization every 6 hours as needed for Wheezing 120 each 2    metoprolol succinate (TOPROL XL) 25 MG extended release tablet Take 1 tablet by mouth daily 90 tablet 3    atorvastatin

## 2023-10-03 ENCOUNTER — COMMUNITY OUTREACH (OUTPATIENT)
Dept: FAMILY MEDICINE CLINIC | Age: 63
End: 2023-10-03

## 2023-10-30 RX ORDER — CLOPIDOGREL BISULFATE 75 MG/1
75 TABLET ORAL DAILY
Qty: 90 TABLET | Refills: 2 | Status: SHIPPED | OUTPATIENT
Start: 2023-10-30

## 2023-11-03 ENCOUNTER — PROCEDURE VISIT (OUTPATIENT)
Dept: CARDIOLOGY CLINIC | Age: 63
End: 2023-11-03

## 2023-11-03 DIAGNOSIS — Z95.810 ICD (IMPLANTABLE CARDIOVERTER-DEFIBRILLATOR) IN PLACE: Primary | ICD-10-CM

## 2023-11-03 NOTE — PROGRESS NOTES
[employer] Medtronic Dual ICD   Pt of Matthew    Battery 12.5 years     Presenting rhythm ASVS    A Impedance 456  RV Impedance 418    RV shock 67  SVC shock -    P wave sensing 2.3  R wave sensing 6.9    A Threshold 0.5 @ 0.40  A Amplitude 1.5 @ 0.40  RV Thresholds 0.75 @ 0.40  RV Amplitude 2.0 @ 0.40    A Paced 5.7%  V Paced <0.1%    Programmed Mode AAI <=> DDD     Afib [place] 0%    Episodes:   8/26/23-7 beats VT rate 188    Optivol WNL

## 2023-11-09 RX ORDER — SACUBITRIL AND VALSARTAN 24; 26 MG/1; MG/1
TABLET, FILM COATED ORAL
Qty: 180 TABLET | Refills: 1 | Status: SHIPPED | OUTPATIENT
Start: 2023-11-09

## 2023-11-10 ENCOUNTER — NURSE ONLY (OUTPATIENT)
Dept: LAB | Age: 63
End: 2023-11-10

## 2023-11-10 DIAGNOSIS — I25.10 CORONARY ARTERY DISEASE INVOLVING NATIVE CORONARY ARTERY OF NATIVE HEART WITHOUT ANGINA PECTORIS: ICD-10-CM

## 2023-11-10 DIAGNOSIS — Z12.5 SCREENING FOR PROSTATE CANCER: ICD-10-CM

## 2023-11-10 DIAGNOSIS — E78.00 PURE HYPERCHOLESTEROLEMIA: ICD-10-CM

## 2023-11-10 DIAGNOSIS — E11.65 UNCONTROLLED TYPE 2 DIABETES MELLITUS WITH HYPERGLYCEMIA, WITHOUT LONG-TERM CURRENT USE OF INSULIN (HCC): ICD-10-CM

## 2023-11-10 LAB
ALBUMIN SERPL BCG-MCNC: 4.5 G/DL (ref 3.5–5.1)
ALP SERPL-CCNC: 64 U/L (ref 38–126)
ALT SERPL W/O P-5'-P-CCNC: 18 U/L (ref 11–66)
ANION GAP SERPL CALC-SCNC: 14 MEQ/L (ref 8–16)
AST SERPL-CCNC: 13 U/L (ref 5–40)
BASOPHILS ABSOLUTE: 0.1 THOU/MM3 (ref 0–0.1)
BASOPHILS NFR BLD AUTO: 1 %
BILIRUB SERPL-MCNC: 0.2 MG/DL (ref 0.3–1.2)
BUN SERPL-MCNC: 25 MG/DL (ref 7–22)
CALCIUM SERPL-MCNC: 9.9 MG/DL (ref 8.5–10.5)
CHLORIDE SERPL-SCNC: 101 MEQ/L (ref 98–111)
CHOLEST SERPL-MCNC: 166 MG/DL (ref 100–199)
CO2 SERPL-SCNC: 22 MEQ/L (ref 23–33)
CREAT SERPL-MCNC: 1 MG/DL (ref 0.4–1.2)
DEPRECATED MEAN GLUCOSE BLD GHB EST-ACNC: 183 MG/DL (ref 70–126)
DEPRECATED RDW RBC AUTO: 50.6 FL (ref 35–45)
EOSINOPHIL NFR BLD AUTO: 2.1 %
EOSINOPHILS ABSOLUTE: 0.2 THOU/MM3 (ref 0–0.4)
ERYTHROCYTE [DISTWIDTH] IN BLOOD BY AUTOMATED COUNT: 14.1 % (ref 11.5–14.5)
GFR SERPL CREATININE-BSD FRML MDRD: > 60 ML/MIN/1.73M2
GLUCOSE SERPL-MCNC: 208 MG/DL (ref 70–108)
HBA1C MFR BLD HPLC: 8.1 % (ref 4.4–6.4)
HCT VFR BLD AUTO: 50 % (ref 42–52)
HDLC SERPL-MCNC: 40 MG/DL
HGB BLD-MCNC: 16.5 GM/DL (ref 14–18)
IMM GRANULOCYTES # BLD AUTO: 0.02 THOU/MM3 (ref 0–0.07)
IMM GRANULOCYTES NFR BLD AUTO: 0.3 %
LDLC SERPL CALC-MCNC: 97 MG/DL
LYMPHOCYTES ABSOLUTE: 2.2 THOU/MM3 (ref 1–4.8)
LYMPHOCYTES NFR BLD AUTO: 29.2 %
MCH RBC QN AUTO: 32.2 PG (ref 26–33)
MCHC RBC AUTO-ENTMCNC: 33 GM/DL (ref 32.2–35.5)
MCV RBC AUTO: 97.7 FL (ref 80–94)
MONOCYTES ABSOLUTE: 0.7 THOU/MM3 (ref 0.4–1.3)
MONOCYTES NFR BLD AUTO: 9.6 %
NEUTROPHILS NFR BLD AUTO: 57.8 %
NRBC BLD AUTO-RTO: 0 /100 WBC
PLATELET # BLD AUTO: 228 THOU/MM3 (ref 130–400)
PMV BLD AUTO: 9.8 FL (ref 9.4–12.4)
POTASSIUM SERPL-SCNC: 5 MEQ/L (ref 3.5–5.2)
PROT SERPL-MCNC: 8.1 G/DL (ref 6.1–8)
PSA SERPL-MCNC: 0.66 NG/ML (ref 0–1)
RBC # BLD AUTO: 5.12 MILL/MM3 (ref 4.7–6.1)
SEGMENTED NEUTROPHILS ABSOLUTE COUNT: 4.5 THOU/MM3 (ref 1.8–7.7)
SODIUM SERPL-SCNC: 137 MEQ/L (ref 135–145)
TRIGL SERPL-MCNC: 147 MG/DL (ref 0–199)
TSH SERPL DL<=0.005 MIU/L-ACNC: 2.57 UIU/ML (ref 0.4–4.2)
WBC # BLD AUTO: 7.7 THOU/MM3 (ref 4.8–10.8)

## 2023-11-13 RX ORDER — BUMETANIDE 1 MG/1
TABLET ORAL
Qty: 90 TABLET | Refills: 0 | Status: SHIPPED | OUTPATIENT
Start: 2023-11-13

## 2023-11-15 ENCOUNTER — TELEPHONE (OUTPATIENT)
Dept: FAMILY MEDICINE CLINIC | Age: 63
End: 2023-11-15

## 2023-11-15 ENCOUNTER — OFFICE VISIT (OUTPATIENT)
Dept: FAMILY MEDICINE CLINIC | Age: 63
End: 2023-11-15
Payer: MEDICAID

## 2023-11-15 VITALS
DIASTOLIC BLOOD PRESSURE: 60 MMHG | WEIGHT: 203 LBS | HEIGHT: 71 IN | SYSTOLIC BLOOD PRESSURE: 106 MMHG | RESPIRATION RATE: 16 BRPM | BODY MASS INDEX: 28.42 KG/M2 | HEART RATE: 76 BPM

## 2023-11-15 DIAGNOSIS — I25.10 CORONARY ARTERY DISEASE INVOLVING NATIVE CORONARY ARTERY OF NATIVE HEART WITHOUT ANGINA PECTORIS: ICD-10-CM

## 2023-11-15 DIAGNOSIS — J44.9 CHRONIC OBSTRUCTIVE PULMONARY DISEASE, UNSPECIFIED COPD TYPE (HCC): ICD-10-CM

## 2023-11-15 DIAGNOSIS — Z95.1 S/P CABG X 2: ICD-10-CM

## 2023-11-15 DIAGNOSIS — E11.65 UNCONTROLLED TYPE 2 DIABETES MELLITUS WITH HYPERGLYCEMIA, WITHOUT LONG-TERM CURRENT USE OF INSULIN (HCC): Primary | ICD-10-CM

## 2023-11-15 DIAGNOSIS — E78.00 PURE HYPERCHOLESTEROLEMIA: ICD-10-CM

## 2023-11-15 DIAGNOSIS — I10 ESSENTIAL HYPERTENSION: ICD-10-CM

## 2023-11-15 DIAGNOSIS — I47.20 VENTRICULAR TACHYARRHYTHMIA (HCC): ICD-10-CM

## 2023-11-15 PROCEDURE — G8427 DOCREV CUR MEDS BY ELIG CLIN: HCPCS | Performed by: FAMILY MEDICINE

## 2023-11-15 PROCEDURE — 3052F HG A1C>EQUAL 8.0%<EQUAL 9.0%: CPT | Performed by: FAMILY MEDICINE

## 2023-11-15 PROCEDURE — 99214 OFFICE O/P EST MOD 30 MIN: CPT | Performed by: FAMILY MEDICINE

## 2023-11-15 PROCEDURE — 3078F DIAST BP <80 MM HG: CPT | Performed by: FAMILY MEDICINE

## 2023-11-15 PROCEDURE — 4004F PT TOBACCO SCREEN RCVD TLK: CPT | Performed by: FAMILY MEDICINE

## 2023-11-15 PROCEDURE — 2022F DILAT RTA XM EVC RTNOPTHY: CPT | Performed by: FAMILY MEDICINE

## 2023-11-15 PROCEDURE — G8419 CALC BMI OUT NRM PARAM NOF/U: HCPCS | Performed by: FAMILY MEDICINE

## 2023-11-15 PROCEDURE — 3074F SYST BP LT 130 MM HG: CPT | Performed by: FAMILY MEDICINE

## 2023-11-15 PROCEDURE — 3017F COLORECTAL CA SCREEN DOC REV: CPT | Performed by: FAMILY MEDICINE

## 2023-11-15 PROCEDURE — G8484 FLU IMMUNIZE NO ADMIN: HCPCS | Performed by: FAMILY MEDICINE

## 2023-11-15 PROCEDURE — 3023F SPIROM DOC REV: CPT | Performed by: FAMILY MEDICINE

## 2023-11-15 RX ORDER — ORAL SEMAGLUTIDE 3 MG/1
TABLET ORAL
Qty: 30 TABLET | Refills: 0 | Status: SHIPPED | OUTPATIENT
Start: 2023-11-15

## 2023-11-15 ASSESSMENT — ENCOUNTER SYMPTOMS
GASTROINTESTINAL NEGATIVE: 1
RESPIRATORY NEGATIVE: 1

## 2023-11-15 NOTE — TELEPHONE ENCOUNTER
Fax received from the pharmacy requesting a PA on the patients Rybelsus 3mg. PA submitted through CoverMyMeds, waiting on determination.

## 2023-11-15 NOTE — PROGRESS NOTES
(LIDEX) 0.05 % cream Apply topically 2 times daily. 30 g 0    linagliptin (TRADJENTA) 5 MG tablet Take 1 tablet by mouth daily 90 tablet 3    Empagliflozin-metFORMIN HCl (SYNJARDY) 5-1000 MG TABS Take 1 tablet by mouth twice daily 60 tablet 5    albuterol (PROVENTIL) (2.5 MG/3ML) 0.083% nebulizer solution Take 3 mLs by nebulization every 6 hours as needed for Wheezing 120 each 2    metoprolol succinate (TOPROL XL) 25 MG extended release tablet Take 1 tablet by mouth daily 90 tablet 3    atorvastatin (LIPITOR) 80 MG tablet Take 0.5 tablets by mouth nightly 90 tablet 3    amLODIPine (NORVASC) 5 MG tablet Take 1 tablet by mouth daily 90 tablet 3    nitroGLYCERIN (NITROSTAT) 0.4 MG SL tablet Place 1 tablet under the tongue every 5 minutes as needed for Chest pain 25 tablet 3    aspirin 325 MG tablet Take 1 tablet by mouth daily      glucose blood VI test strips (FREESTYLE LITE) strip TEST twice a day 100 strip 3    FREESTYLE LANCETS MISC TEST twice a day 100 each 3     No current facility-administered medications for this visit. Past Surgical History:   Procedure Laterality Date    APPENDECTOMY      CARDIAC CATHETERIZATION  9/2/11    CARDIAC CATHETERIZATION  6/12/14    [facility]    COLONOSCOPY      CORONARY ANGIOPLASTY WITH STENT PLACEMENT  3/12/10    [facility]    CORONARY ARTERY BYPASS GRAFT  2014    [facility]    VASCULAR SURGERY         Review of Systems   Constitutional: Negative. HENT: Negative. Respiratory: Negative. Cardiovascular: Negative. Gastrointestinal: Negative. Musculoskeletal: Negative. All other systems reviewed and are negative. Objective   Physical Exam  Vitals and nursing note reviewed. Constitutional:       General: He is not in acute distress. Appearance: Normal appearance. He is well-developed. HENT:      Head: Normocephalic and atraumatic.       Right Ear: Tympanic membrane normal.      Left Ear: Tympanic membrane normal.   Eyes:      Conjunctiva/sclera: Conjunctivae

## 2023-11-16 NOTE — TELEPHONE ENCOUNTER
Fax received from [facility] stating that the patient has to have tried and failed 120 days of therapy with Three preferred medications one being Byetta 5mcg and 10mcg, Victoza 04CM/9HL or Trulicity 1.55HC, 5.5FX, 3mg and 4.5mg.  whick include but not limited to :farxiga 5mg and 10mg, Invokana 100mg and 300mg, Victoza 18mg/3ml and jardiance 10 and 25mg. Coverage is provided when the member has had an inadequate clinical response (the inability to reach A1C <7%. Please advise.

## 2023-11-21 ENCOUNTER — TELEPHONE (OUTPATIENT)
Dept: FAMILY MEDICINE CLINIC | Age: 63
End: 2023-11-21

## 2023-11-21 RX ORDER — DULAGLUTIDE 0.75 MG/.5ML
0.75 INJECTION, SOLUTION SUBCUTANEOUS WEEKLY
Qty: 2 ML | Refills: 0 | Status: SHIPPED | OUTPATIENT
Start: 2023-11-21

## 2023-11-25 DIAGNOSIS — E78.00 PURE HYPERCHOLESTEROLEMIA: ICD-10-CM

## 2023-11-27 NOTE — TELEPHONE ENCOUNTER
Toprol 25 mg daily  Amlodipine 5 mg daily  Atorvastatin 80 mg nightly    Next appt 4/22/24  Labs 11/10/23

## 2023-11-28 RX ORDER — AMLODIPINE BESYLATE 5 MG/1
5 TABLET ORAL DAILY
Qty: 90 TABLET | Refills: 0 | Status: SHIPPED | OUTPATIENT
Start: 2023-11-28

## 2023-11-28 RX ORDER — METOPROLOL SUCCINATE 25 MG/1
25 TABLET, EXTENDED RELEASE ORAL DAILY
Qty: 90 TABLET | Refills: 0 | Status: SHIPPED | OUTPATIENT
Start: 2023-11-28

## 2023-11-28 RX ORDER — ATORVASTATIN CALCIUM 80 MG/1
80 TABLET, FILM COATED ORAL NIGHTLY
Qty: 90 TABLET | Refills: 0 | Status: SHIPPED | OUTPATIENT
Start: 2023-11-28

## 2023-12-08 ENCOUNTER — PROCEDURE VISIT (OUTPATIENT)
Dept: CARDIOLOGY CLINIC | Age: 63
End: 2023-12-08

## 2023-12-08 DIAGNOSIS — I50.40 COMBINED SYSTOLIC AND DIASTOLIC CONGESTIVE HEART FAILURE, UNSPECIFIED HF CHRONICITY (HCC): Primary | ICD-10-CM

## 2023-12-13 RX ORDER — DULAGLUTIDE 1.5 MG/.5ML
1.5 INJECTION, SOLUTION SUBCUTANEOUS WEEKLY
Qty: 2 ML | Refills: 2 | Status: SHIPPED | OUTPATIENT
Start: 2023-12-13

## 2024-01-12 ENCOUNTER — PROCEDURE VISIT (OUTPATIENT)
Dept: CARDIOLOGY CLINIC | Age: 64
End: 2024-01-12

## 2024-01-12 DIAGNOSIS — I50.40 COMBINED SYSTOLIC AND DIASTOLIC CONGESTIVE HEART FAILURE, UNSPECIFIED HF CHRONICITY (HCC): Primary | ICD-10-CM

## 2024-01-12 RX ORDER — HYDROXYZINE HYDROCHLORIDE 25 MG/1
25 TABLET, FILM COATED ORAL NIGHTLY
Qty: 30 TABLET | Refills: 0 | Status: SHIPPED | OUTPATIENT
Start: 2024-01-12

## 2024-01-12 NOTE — PROGRESS NOTES
Noted  NSVT 3 episodes 3 to 9 beats  One SVT  This pat of dr. Matthew Hayes monitoring  Need F/u with the cardiologist he is going see asap-

## 2024-01-12 NOTE — PROGRESS NOTES
[facility] Medtronic Dual ICD Optivol  Pt of Leonel - switch from Dr. Castro    Battery 12.2 years    Optivol WNL    Episodes  NSVT/SVT Dec 2023 - Jan 2024

## 2024-01-15 RX ORDER — EMPAGLIFLOZIN AND METFORMIN HYDROCHLORIDE 5; 1000 MG/1; MG/1
TABLET ORAL
Qty: 60 TABLET | Refills: 5 | Status: SHIPPED | OUTPATIENT
Start: 2024-01-15

## 2024-01-29 ENCOUNTER — OFFICE VISIT (OUTPATIENT)
Dept: CARDIOLOGY CLINIC | Age: 64
End: 2024-01-29
Payer: MEDICAID

## 2024-01-29 VITALS
BODY MASS INDEX: 27.5 KG/M2 | WEIGHT: 196.4 LBS | DIASTOLIC BLOOD PRESSURE: 65 MMHG | HEIGHT: 71 IN | SYSTOLIC BLOOD PRESSURE: 105 MMHG | HEART RATE: 81 BPM

## 2024-01-29 DIAGNOSIS — R00.2 INTERMITTENT PALPITATIONS: ICD-10-CM

## 2024-01-29 DIAGNOSIS — I47.29 NSVT (NONSUSTAINED VENTRICULAR TACHYCARDIA) (HCC): ICD-10-CM

## 2024-01-29 DIAGNOSIS — Z72.0 TOBACCO ABUSE: ICD-10-CM

## 2024-01-29 DIAGNOSIS — I25.5 ISCHEMIC CARDIOMYOPATHY: Primary | ICD-10-CM

## 2024-01-29 DIAGNOSIS — I50.42 CHRONIC COMBINED SYSTOLIC AND DIASTOLIC CONGESTIVE HEART FAILURE (HCC): ICD-10-CM

## 2024-01-29 DIAGNOSIS — I10 PRIMARY HYPERTENSION: ICD-10-CM

## 2024-01-29 DIAGNOSIS — I25.10 CORONARY ARTERY DISEASE INVOLVING NATIVE CORONARY ARTERY OF NATIVE HEART WITHOUT ANGINA PECTORIS: ICD-10-CM

## 2024-01-29 DIAGNOSIS — Z95.810 ICD (IMPLANTABLE CARDIOVERTER-DEFIBRILLATOR) IN PLACE: ICD-10-CM

## 2024-01-29 DIAGNOSIS — E78.00 PURE HYPERCHOLESTEROLEMIA: ICD-10-CM

## 2024-01-29 DIAGNOSIS — Z95.1 S/P CABG X 2: ICD-10-CM

## 2024-01-29 PROBLEM — R42 DIZZINESSES: Status: RESOLVED | Noted: 2022-08-09 | Resolved: 2024-01-29

## 2024-01-29 PROBLEM — I95.9 HYPOTENSION: Status: RESOLVED | Noted: 2022-10-29 | Resolved: 2024-01-29

## 2024-01-29 PROBLEM — I20.0 ANGINA PECTORIS, CRESCENDO (HCC): Status: RESOLVED | Noted: 2022-10-21 | Resolved: 2024-01-29

## 2024-01-29 PROCEDURE — 3017F COLORECTAL CA SCREEN DOC REV: CPT | Performed by: INTERNAL MEDICINE

## 2024-01-29 PROCEDURE — G8484 FLU IMMUNIZE NO ADMIN: HCPCS | Performed by: INTERNAL MEDICINE

## 2024-01-29 PROCEDURE — 4004F PT TOBACCO SCREEN RCVD TLK: CPT | Performed by: INTERNAL MEDICINE

## 2024-01-29 PROCEDURE — 3074F SYST BP LT 130 MM HG: CPT | Performed by: INTERNAL MEDICINE

## 2024-01-29 PROCEDURE — G8419 CALC BMI OUT NRM PARAM NOF/U: HCPCS | Performed by: INTERNAL MEDICINE

## 2024-01-29 PROCEDURE — 3078F DIAST BP <80 MM HG: CPT | Performed by: INTERNAL MEDICINE

## 2024-01-29 PROCEDURE — G8427 DOCREV CUR MEDS BY ELIG CLIN: HCPCS | Performed by: INTERNAL MEDICINE

## 2024-01-29 PROCEDURE — 99204 OFFICE O/P NEW MOD 45 MIN: CPT | Performed by: INTERNAL MEDICINE

## 2024-01-29 RX ORDER — METOPROLOL SUCCINATE 50 MG/1
50 TABLET, EXTENDED RELEASE ORAL DAILY
Qty: 90 TABLET | Refills: 3 | Status: SHIPPED | OUTPATIENT
Start: 2024-01-29

## 2024-01-29 RX ORDER — AMLODIPINE BESYLATE 2.5 MG/1
2.5 TABLET ORAL DAILY
Qty: 90 TABLET | Refills: 2 | Status: SHIPPED | OUTPATIENT
Start: 2024-01-29

## 2024-01-29 NOTE — PROGRESS NOTES
Chief Complaint   Patient presents with    New Patient     New patient former taco patient    EKG done 4/23/23    Denied cp, dizziness or edema    Occasional palpitation    Sob on exertion Hx of COPD    Still smoking        Past Surgical History:   Procedure Laterality Date    APPENDECTOMY      CARDIAC CATHETERIZATION  9/2/11    CARDIAC CATHETERIZATION  6/12/14    [facility]    COLONOSCOPY      CORONARY ANGIOPLASTY WITH STENT PLACEMENT  3/12/10    [facility]    CORONARY ARTERY BYPASS GRAFT  2014    [facility]    VASCULAR SURGERY         No Known Allergies     Family History   Problem Relation Age of Onset    High Blood Pressure Mother     Heart Disease Father     High Blood Pressure Sister     Heart Disease Brother     Heart Disease Brother     Diabetes Paternal Cousin     Cancer Neg Hx     Stroke Neg Hx         Social History     Socioeconomic History    Marital status:      Spouse name: Barbara    Number of children: 2    Years of education: 12    Highest education level: High school graduate   Occupational History    Not on file   Tobacco Use    Smoking status: Every Day     Current packs/day: 0.25     Average packs/day: 0.3 packs/day for 32.0 years (8.0 ttl pk-yrs)     Types: Cigarettes    Smokeless tobacco: Never   Vaping Use    Vaping Use: Never used   Substance and Sexual Activity    Alcohol use: No     Alcohol/week: 0.0 standard drinks of alcohol    Drug use: No    Sexual activity: Yes     Partners: Female   Other Topics Concern    Not on file   Social History Narrative    Not on file     Social Determinants of Health     Financial Resource Strain: Low Risk  (2/13/2023)    Overall Financial Resource Strain (CARDIA)     Difficulty of Paying Living Expenses: Not hard at all   Food Insecurity: Not on file (2/13/2023)   Transportation Needs: Unknown (2/13/2023)    PRAPARE - Transportation     Lack of Transportation (Medical): Not on file     Lack of Transportation (Non-Medical): No   Physical Activity: Inactive

## 2024-02-02 ENCOUNTER — TELEMEDICINE (OUTPATIENT)
Dept: FAMILY MEDICINE CLINIC | Age: 64
End: 2024-02-02
Payer: MEDICAID

## 2024-02-02 DIAGNOSIS — I73.9 CLAUDICATION (HCC): ICD-10-CM

## 2024-02-02 DIAGNOSIS — E11.40 DIABETIC NEUROPATHY, PAINFUL (HCC): Primary | ICD-10-CM

## 2024-02-02 DIAGNOSIS — Z72.0 TOBACCO ABUSE: ICD-10-CM

## 2024-02-02 DIAGNOSIS — I25.10 CORONARY ARTERY DISEASE INVOLVING NATIVE CORONARY ARTERY OF NATIVE HEART WITHOUT ANGINA PECTORIS: ICD-10-CM

## 2024-02-02 DIAGNOSIS — E11.65 UNCONTROLLED TYPE 2 DIABETES MELLITUS WITH HYPERGLYCEMIA, WITHOUT LONG-TERM CURRENT USE OF INSULIN (HCC): ICD-10-CM

## 2024-02-02 PROCEDURE — 3046F HEMOGLOBIN A1C LEVEL >9.0%: CPT | Performed by: FAMILY MEDICINE

## 2024-02-02 PROCEDURE — 2022F DILAT RTA XM EVC RTNOPTHY: CPT | Performed by: FAMILY MEDICINE

## 2024-02-02 PROCEDURE — 3017F COLORECTAL CA SCREEN DOC REV: CPT | Performed by: FAMILY MEDICINE

## 2024-02-02 PROCEDURE — G8428 CUR MEDS NOT DOCUMENT: HCPCS | Performed by: FAMILY MEDICINE

## 2024-02-02 PROCEDURE — 99214 OFFICE O/P EST MOD 30 MIN: CPT | Performed by: FAMILY MEDICINE

## 2024-02-02 RX ORDER — GABAPENTIN 100 MG/1
100 CAPSULE ORAL 3 TIMES DAILY
Qty: 45 CAPSULE | Refills: 0 | Status: SHIPPED | OUTPATIENT
Start: 2024-02-02 | End: 2024-02-17

## 2024-02-02 ASSESSMENT — ENCOUNTER SYMPTOMS
GASTROINTESTINAL NEGATIVE: 1
RESPIRATORY NEGATIVE: 1
COLOR CHANGE: 1

## 2024-02-02 NOTE — PROGRESS NOTES
Tereso Hathaway (:  1960) is a Established patient, here for evaluation of the following:    Subjective   HPI:   Chief Complaint   Patient presents with    Numbness     See message below:    Is there any thing I can do or take for diabetic nerve pain? I get numbness in the fingers and my feet are always tingling and about 85% of the time my hurt        Thanks    Recently seen by Dr. Castaneda who told him the pain was related to his DM.    Also concerned about leg pain with activity as well as changing to a dark purple color at times.    Sugars are getting better per pt.  Lab Results   Component Value Date    LABA1C 8.1 (H) 11/10/2023    LABA1C 8.4 (H) 2023    LABA1C 8.5 (H) 02/10/2023     Lab Results   Component Value Date    GLUF 142 (H) 2017    MALBCR 58 (H) 2021    LDLCALC 97 11/10/2023    CREATININE 1.0 11/10/2023     Patient Active Problem List   Diagnosis    COPD (chronic obstructive pulmonary disease) (HCC)    CAD (coronary artery disease)    Post PTCA with MI    Hyperlipidemia    HTN (hypertension)    Tobacco abuse    Polyp of colon    Abnormal stress ECG with treadmill    Diabetes mellitus type II, uncontrolled    S/P CABG x 2    Uncontrolled type 2 diabetes mellitus, without long-term current use of insulin    Mild left ventricular systolic dysfunction    Ventricular tachycardia (HCC)    Ischemic cardiomyopathy    Medtronic dual ICD    Bronchitis    Tobacco dependence syndrome    NSVT (nonsustained ventricular tachycardia) (HCC) on ICD check     Intermittent palpitations    Chronic combined systolic and diastolic congestive heart failure (HCC)     Past Surgical History:   Procedure Laterality Date    APPENDECTOMY      CARDIAC CATHETERIZATION  11    CARDIAC CATHETERIZATION  14    [facility]    COLONOSCOPY      CORONARY ANGIOPLASTY WITH STENT PLACEMENT  3/12/10    [facility]    CORONARY ARTERY BYPASS GRAFT      [facility]    VASCULAR SURGERY       Social History     Tobacco Use

## 2024-02-08 RX ORDER — BUMETANIDE 1 MG/1
TABLET ORAL
Qty: 90 TABLET | Refills: 1 | Status: SHIPPED | OUTPATIENT
Start: 2024-02-08

## 2024-02-09 ENCOUNTER — PROCEDURE VISIT (OUTPATIENT)
Dept: CARDIOLOGY CLINIC | Age: 64
End: 2024-02-09

## 2024-02-09 DIAGNOSIS — Z95.810 ICD (IMPLANTABLE CARDIOVERTER-DEFIBRILLATOR) IN PLACE: Primary | ICD-10-CM

## 2024-02-09 NOTE — PROGRESS NOTES
[employer] medtronic dual ICD     ..Battery longevity:  12.2 years   Presenting rhythm  AS VS   Optivol WNl    Atrial impedance 437  RV impedance 361  Shock 57    P wave sensing 1.6  R wave sensing 4.6    5.9 % atrial paced  0.1 % RV paced     Atrial threshold 0.5 V  at 0.4ms  RV threshold 0.875 V at 0.4ms  Mode switches   0

## 2024-02-12 ENCOUNTER — TELEPHONE (OUTPATIENT)
Dept: FAMILY MEDICINE CLINIC | Age: 64
End: 2024-02-12

## 2024-02-12 RX ORDER — SEMAGLUTIDE 1.34 MG/ML
1 INJECTION, SOLUTION SUBCUTANEOUS WEEKLY
Qty: 3 ML | Refills: 2 | Status: SHIPPED | OUTPATIENT
Start: 2024-02-12

## 2024-02-12 RX ORDER — GABAPENTIN 300 MG/1
300 CAPSULE ORAL 3 TIMES DAILY
Qty: 45 CAPSULE | Refills: 0 | Status: SHIPPED | OUTPATIENT
Start: 2024-02-12 | End: 2024-02-27

## 2024-02-12 NOTE — TELEPHONE ENCOUNTER
Patient calling to state Trulicity is on backorder with no expected date.  Requesting alternative medication to [facility] [address].  Please advise and call patient.

## 2024-02-13 ENCOUNTER — TELEPHONE (OUTPATIENT)
Dept: FAMILY MEDICINE CLINIC | Age: 64
End: 2024-02-13

## 2024-02-13 NOTE — TELEPHONE ENCOUNTER
PA request received from pharmacy for Ozempic 1mg dose 4mg/3ml.  PA submitted online at [email or web address] and pending review.

## 2024-02-20 ENCOUNTER — HOSPITAL ENCOUNTER (OUTPATIENT)
Dept: INTERVENTIONAL RADIOLOGY/VASCULAR | Age: 64
Discharge: HOME | End: 2024-02-22
Attending: FAMILY MEDICINE
Payer: MEDICAID

## 2024-02-20 DIAGNOSIS — I73.9 CLAUDICATION (HCC): ICD-10-CM

## 2024-02-20 PROCEDURE — 93922 UPR/L XTREMITY ART 2 LEVELS: CPT

## 2024-02-21 DIAGNOSIS — E78.00 PURE HYPERCHOLESTEROLEMIA: ICD-10-CM

## 2024-02-21 RX ORDER — BUMETANIDE 1 MG/1
1 TABLET ORAL DAILY
Qty: 90 TABLET | Refills: 1 | Status: SHIPPED | OUTPATIENT
Start: 2024-02-21

## 2024-02-21 RX ORDER — ATORVASTATIN CALCIUM 80 MG/1
80 TABLET, FILM COATED ORAL NIGHTLY
Qty: 90 TABLET | Refills: 0 | Status: SHIPPED | OUTPATIENT
Start: 2024-02-21

## 2024-02-22 ENCOUNTER — OFFICE VISIT (OUTPATIENT)
Dept: FAMILY MEDICINE CLINIC | Age: 64
End: 2024-02-22

## 2024-02-22 VITALS
HEART RATE: 72 BPM | DIASTOLIC BLOOD PRESSURE: 70 MMHG | BODY MASS INDEX: 27.09 KG/M2 | RESPIRATION RATE: 16 BRPM | SYSTOLIC BLOOD PRESSURE: 110 MMHG | WEIGHT: 191.5 LBS

## 2024-02-22 DIAGNOSIS — I73.9 CLAUDICATION (HCC): Primary | ICD-10-CM

## 2024-02-22 DIAGNOSIS — J44.9 CHRONIC OBSTRUCTIVE PULMONARY DISEASE, UNSPECIFIED COPD TYPE (HCC): ICD-10-CM

## 2024-02-22 DIAGNOSIS — R68.89 ABNORMAL ANKLE BRACHIAL INDEX (ABI): ICD-10-CM

## 2024-02-22 DIAGNOSIS — E11.40 DIABETIC NEUROPATHY, PAINFUL (HCC): ICD-10-CM

## 2024-02-22 DIAGNOSIS — Z72.0 TOBACCO ABUSE: ICD-10-CM

## 2024-02-22 SDOH — ECONOMIC STABILITY: INCOME INSECURITY: HOW HARD IS IT FOR YOU TO PAY FOR THE VERY BASICS LIKE FOOD, HOUSING, MEDICAL CARE, AND HEATING?: NOT HARD AT ALL

## 2024-02-22 SDOH — ECONOMIC STABILITY: FOOD INSECURITY: WITHIN THE PAST 12 MONTHS, THE FOOD YOU BOUGHT JUST DIDN'T LAST AND YOU DIDN'T HAVE MONEY TO GET MORE.: NEVER TRUE

## 2024-02-22 SDOH — ECONOMIC STABILITY: FOOD INSECURITY: WITHIN THE PAST 12 MONTHS, YOU WORRIED THAT YOUR FOOD WOULD RUN OUT BEFORE YOU GOT MONEY TO BUY MORE.: NEVER TRUE

## 2024-02-22 ASSESSMENT — PATIENT HEALTH QUESTIONNAIRE - PHQ9
SUM OF ALL RESPONSES TO PHQ QUESTIONS 1-9: 0
SUM OF ALL RESPONSES TO PHQ9 QUESTIONS 1 & 2: 0
2. FEELING DOWN, DEPRESSED OR HOPELESS: 0
SUM OF ALL RESPONSES TO PHQ QUESTIONS 1-9: 0
1. LITTLE INTEREST OR PLEASURE IN DOING THINGS: 0
SUM OF ALL RESPONSES TO PHQ QUESTIONS 1-9: 0
SUM OF ALL RESPONSES TO PHQ QUESTIONS 1-9: 0

## 2024-02-22 ASSESSMENT — ENCOUNTER SYMPTOMS
RESPIRATORY NEGATIVE: 1
GASTROINTESTINAL NEGATIVE: 1

## 2024-02-22 NOTE — PATIENT INSTRUCTIONS
You may receive a survey about your visit with us today. The feedback from our patients helps us identify what is working well and where the service to all patients can be enhanced. Thank you!   Tobacco Cessation Programs     Telephonic behavior modification  1-800-QUIT-NOW (787-7163)  Counseling service for those who are ready to quit using tobacco.    Available for uninsured [place] residents, Medicaid recipients, pregnant women, or patients whose health plans or employers are members of the [place] [employer]    Online behavior modification  http://[place].Quitlogix.org  Online support program to help patients through each step of the quitting process.  Available 24 hours a day 7 days a week.  Provides up to date researched based tool, step-by-step guides, and motivational messages.    Online behavior modification  www.lungusa.org/stop-smoking/how-to-quit  HelpLine: 1-800-LUNG-[place] (893-7139)  Email questions to:  questions@[facility].org   Website offers resources to help tobacco users figure out their reasons for quitting and then take the big step of quitting for good.    Hypnosis  Location: [address] [place] [address]  Contact: Indra Alegria, PhD at [phone]  Hypnosis for tobacco cessation  Cost $225 for the initial session and $175 for each session afterwards.  Most patients require 6-8 sessions.  There is the option to submit through the patient’s insurance.    Hypnosis and behavior modification  Location: [address] [place] [address]  Contact: Alea Cook, PhD at [phone]  Counseling and hypnosis for nicotine addition  Cost: For uninsured patients:  Please call above phone number  Cost for insured patients depends on patient’s insurance plan.    Behavior modification  Location: [place] [facility], [address] [place] [address]  Contact: [phone]  Services include four one-on-one appointments between the patient and a respiratory therapist.  The four appointments 
full weight-bearing

## 2024-02-22 NOTE — PROGRESS NOTES
I spoke with Nely regarding 2/20/24 MARY and recommendations from radiologist.  Dr Negrete would like to proceed with recommendations from radiologist.  Per Nely at IR Scheduling EXT: [id number], need order for IR angiogram extremity bilateral.  Dr. Negrete aware and will place order and cancel order for CTA Abdominal Aorta with and without contrast

## 2024-02-22 NOTE — PROGRESS NOTES
Referral faxed to [facility] at #[phone].   Patient notified and understanding voiced.

## 2024-02-22 NOTE — PROGRESS NOTES
Tereso Hathaway (:  1960) is a 63 y.o. male,Established patient, here for evaluation of the following chief complaint(s):  Follow-up and Hypotension          Subjective   SUBJECTIVE/OBJECTIVE:  HPI  Chief Complaint   Patient presents with    Follow-up    Hypotension     Pt presents to review BPs and MARY.    Per pt, BP was low while getting the MARY.    Looks fine today.  BP Readings from Last 3 Encounters:   24 110/70   24 105/65   11/15/23 106/60     IMPRESSION:  1.  Moderate to severe peripheral vascular disease bilaterally, worse on the right.     2. Aortofemoral angiography is recommended for further evaluation with possible intervention. If desired, this can be scheduled through the Interventional scheduling desk of [facility]'s Radiology Department ([phone]).     Patient Active Problem List   Diagnosis    COPD (chronic obstructive pulmonary disease) (HCC)    CAD (coronary artery disease)    Post PTCA with MI    Hyperlipidemia    HTN (hypertension)    Tobacco abuse    Polyp of colon    Abnormal stress ECG with treadmill    Diabetes mellitus type II, uncontrolled    S/P CABG x 2    Uncontrolled type 2 diabetes mellitus, without long-term current use of insulin    Mild left ventricular systolic dysfunction    Ventricular tachycardia (HCC)    Ischemic cardiomyopathy    Medtronic dual ICD    Bronchitis    Tobacco dependence syndrome    NSVT (nonsustained ventricular tachycardia) (HCC) on ICD check     Intermittent palpitations    Chronic combined systolic and diastolic congestive heart failure (HCC)       Current Outpatient Medications   Medication Sig Dispense Refill    atorvastatin (LIPITOR) 80 MG tablet Take 1 tablet by mouth nightly 90 tablet 0    bumetanide (BUMEX) 1 MG tablet Take 1 tablet by mouth daily 90 tablet 1    gabapentin (NEURONTIN) 300 MG capsule Take 1 capsule by mouth 3 times daily for 15 days. 45 capsule 0    Semaglutide, 1 MG/DOSE, (OZEMPIC, 1 MG/DOSE,) 4 MG/3ML

## 2024-02-27 RX ORDER — GABAPENTIN 600 MG/1
600 TABLET ORAL 3 TIMES DAILY
Qty: 90 TABLET | Refills: 2 | Status: SHIPPED | OUTPATIENT
Start: 2024-02-27 | End: 2024-05-27

## 2024-03-12 ENCOUNTER — HOSPITAL ENCOUNTER (OUTPATIENT)
Dept: INTERVENTIONAL RADIOLOGY/VASCULAR | Age: 64
Discharge: HOME | End: 2024-03-12
Attending: FAMILY MEDICINE
Payer: MEDICAID

## 2024-03-12 VITALS
RESPIRATION RATE: 16 BRPM | HEART RATE: 60 BPM | HEIGHT: 70 IN | DIASTOLIC BLOOD PRESSURE: 62 MMHG | OXYGEN SATURATION: 98 % | TEMPERATURE: 97.7 F | WEIGHT: 191.58 LBS | SYSTOLIC BLOOD PRESSURE: 101 MMHG | BODY MASS INDEX: 27.43 KG/M2

## 2024-03-12 DIAGNOSIS — I73.9 CLAUDICATION (HCC): ICD-10-CM

## 2024-03-12 DIAGNOSIS — R68.89 ABNORMAL ANKLE BRACHIAL INDEX (ABI): ICD-10-CM

## 2024-03-12 LAB
APTT PPP: 32 SECONDS (ref 22–38)
CREAT SERPL-MCNC: 0.9 MG/DL (ref 0.4–1.2)
DEPRECATED RDW RBC AUTO: 56.6 FL (ref 35–45)
ERYTHROCYTE [DISTWIDTH] IN BLOOD BY AUTOMATED COUNT: 15.1 % (ref 11.5–14.5)
GFR SERPL CREATININE-BSD FRML MDRD: > 60 ML/MIN/1.73M2
HCT VFR BLD AUTO: 49.3 % (ref 42–52)
HGB BLD-MCNC: 16.5 GM/DL (ref 14–18)
INR PPP: 0.95 (ref 0.85–1.13)
MCH RBC QN AUTO: 33.5 PG (ref 26–33)
MCHC RBC AUTO-ENTMCNC: 33.5 GM/DL (ref 32.2–35.5)
MCV RBC AUTO: 100 FL (ref 80–94)
PLATELET # BLD AUTO: 235 THOU/MM3 (ref 130–400)
PMV BLD AUTO: 9.5 FL (ref 9.4–12.4)
RBC # BLD AUTO: 4.93 MILL/MM3 (ref 4.7–6.1)
WBC # BLD AUTO: 8.3 THOU/MM3 (ref 4.8–10.8)

## 2024-03-12 PROCEDURE — 6360000002 HC RX W HCPCS: Performed by: RADIOLOGY

## 2024-03-12 PROCEDURE — 36415 COLL VENOUS BLD VENIPUNCTURE: CPT

## 2024-03-12 PROCEDURE — C1769 GUIDE WIRE: HCPCS

## 2024-03-12 PROCEDURE — C1876 STENT, NON-COA/NON-COV W/DEL: HCPCS | Performed by: RADIOLOGY

## 2024-03-12 PROCEDURE — 75625 CONTRAST EXAM ABDOMINL AORTA: CPT

## 2024-03-12 PROCEDURE — 85610 PROTHROMBIN TIME: CPT

## 2024-03-12 PROCEDURE — 7100000011 HC PHASE II RECOVERY - ADDTL 15 MIN: Performed by: RADIOLOGY

## 2024-03-12 PROCEDURE — 2580000003 HC RX 258: Performed by: RADIOLOGY

## 2024-03-12 PROCEDURE — 82565 ASSAY OF CREATININE: CPT

## 2024-03-12 PROCEDURE — 6360000004 HC RX CONTRAST MEDICATION: Performed by: RADIOLOGY

## 2024-03-12 PROCEDURE — 85027 COMPLETE CBC AUTOMATED: CPT

## 2024-03-12 PROCEDURE — 85730 THROMBOPLASTIN TIME PARTIAL: CPT

## 2024-03-12 PROCEDURE — 37226 HC FEMPOP PLASTY & STENT: CPT

## 2024-03-12 PROCEDURE — 75716 ARTERY X-RAYS ARMS/LEGS: CPT

## 2024-03-12 PROCEDURE — 2500000003 HC RX 250 WO HCPCS: Performed by: RADIOLOGY

## 2024-03-12 PROCEDURE — 75774 ARTERY X-RAY EACH VESSEL: CPT

## 2024-03-12 PROCEDURE — 7100000010 HC PHASE II RECOVERY - FIRST 15 MIN: Performed by: RADIOLOGY

## 2024-03-12 RX ORDER — MIDAZOLAM HYDROCHLORIDE 1 MG/ML
1 INJECTION INTRAMUSCULAR; INTRAVENOUS ONCE
Status: DISCONTINUED | OUTPATIENT
Start: 2024-03-12 | End: 2024-03-13 | Stop reason: HOSPADM

## 2024-03-12 RX ORDER — FENTANYL CITRATE 50 UG/ML
50 INJECTION, SOLUTION INTRAMUSCULAR; INTRAVENOUS ONCE
Status: DISCONTINUED | OUTPATIENT
Start: 2024-03-12 | End: 2024-03-13 | Stop reason: HOSPADM

## 2024-03-12 RX ORDER — HEPARIN SODIUM 1000 [USP'U]/ML
INJECTION, SOLUTION INTRAVENOUS; SUBCUTANEOUS PRN
Status: COMPLETED | OUTPATIENT
Start: 2024-03-12 | End: 2024-03-12

## 2024-03-12 RX ORDER — MIDAZOLAM HYDROCHLORIDE 1 MG/ML
INJECTION INTRAMUSCULAR; INTRAVENOUS PRN
Status: COMPLETED | OUTPATIENT
Start: 2024-03-12 | End: 2024-03-12

## 2024-03-12 RX ORDER — SODIUM CHLORIDE 450 MG/100ML
INJECTION, SOLUTION INTRAVENOUS CONTINUOUS
Status: DISCONTINUED | OUTPATIENT
Start: 2024-03-12 | End: 2024-03-13 | Stop reason: HOSPADM

## 2024-03-12 RX ORDER — LIDOCAINE HYDROCHLORIDE 20 MG/ML
INJECTION, SOLUTION EPIDURAL; INFILTRATION; INTRACAUDAL; PERINEURAL PRN
Status: COMPLETED | OUTPATIENT
Start: 2024-03-12 | End: 2024-03-12

## 2024-03-12 RX ORDER — FENTANYL CITRATE 50 UG/ML
INJECTION, SOLUTION INTRAMUSCULAR; INTRAVENOUS PRN
Status: COMPLETED | OUTPATIENT
Start: 2024-03-12 | End: 2024-03-12

## 2024-03-12 RX ADMIN — HEPARIN SODIUM 3000 UNITS: 1000 INJECTION INTRAVENOUS; SUBCUTANEOUS at 10:41

## 2024-03-12 RX ADMIN — HEPARIN SODIUM 1000 UNITS: 1000 INJECTION INTRAVENOUS; SUBCUTANEOUS at 10:51

## 2024-03-12 RX ADMIN — SODIUM CHLORIDE: 4.5 INJECTION, SOLUTION INTRAVENOUS at 08:16

## 2024-03-12 RX ADMIN — MIDAZOLAM 1 MG: 1 INJECTION INTRAMUSCULAR; INTRAVENOUS at 10:22

## 2024-03-12 RX ADMIN — FENTANYL CITRATE 50 MCG: 50 INJECTION, SOLUTION INTRAMUSCULAR; INTRAVENOUS at 10:26

## 2024-03-12 RX ADMIN — FENTANYL CITRATE 50 MCG: 50 INJECTION, SOLUTION INTRAMUSCULAR; INTRAVENOUS at 10:53

## 2024-03-12 RX ADMIN — HEPARIN SODIUM 1000 UNITS: 1000 INJECTION INTRAVENOUS; SUBCUTANEOUS at 11:00

## 2024-03-12 RX ADMIN — FENTANYL CITRATE 50 MCG: 50 INJECTION, SOLUTION INTRAMUSCULAR; INTRAVENOUS at 11:37

## 2024-03-12 RX ADMIN — WATER 2000 MG: 1 INJECTION INTRAMUSCULAR; INTRAVENOUS; SUBCUTANEOUS at 12:47

## 2024-03-12 RX ADMIN — FENTANYL CITRATE 50 MCG: 50 INJECTION, SOLUTION INTRAMUSCULAR; INTRAVENOUS at 10:22

## 2024-03-12 RX ADMIN — IOPAMIDOL 150 ML: 612 INJECTION, SOLUTION INTRAVENOUS at 11:40

## 2024-03-12 RX ADMIN — LIDOCAINE HYDROCHLORIDE 12 ML: 20 INJECTION, SOLUTION EPIDURAL; INFILTRATION; INTRACAUDAL; PERINEURAL at 12:00

## 2024-03-12 RX ADMIN — MIDAZOLAM 1 MG: 1 INJECTION INTRAMUSCULAR; INTRAVENOUS at 10:26

## 2024-03-12 NOTE — OP NOTE
Department of Radiology  Post Procedure Progress Note      Pre-Procedure Diagnosis:  peripheral vascular disease     Procedure Performed:  recanalization and stenting right SFA     Anesthesia: local / versed and fentanyl    Findings: successful    Immediate Complications:  None    Estimated Blood Loss: minimal    SEE DICTATED PROCEDURE NOTE FOR COMPLETE DETAILS.    Jerry Whittington MD   3/12/2024 11:43 AM

## 2024-03-12 NOTE — FLOWSHEET NOTE
0730 Patient admitted to 2E16  Ambulatory for lower extremity angiogram.  Patient NPO. Patient accompanied by spouse and daughter.  Vital signs obtained.   Assessment and data collection intiated.   Oriented to room.  Policies and procedures for 2E explained.   All questions answered with no further questions at this time.   Fall precautions reviewed with patient.     0730 Care plan reviewed with patient and spouse.  Patient and spouse verbalize understanding of the plan of care and contribute to goal setting.       0945  to IR per bed, stable condition.     1205 care taken over from IR, site with dressing to left femoral dry and intact. Patient reinstructed not to bend groin, not to cross legs, not to lift head off of pillow, if laughs or coughs to hold site for reinforcement. Voices understanding , taking water without difficulty.        1300 up in room, tolerated activity well. Patient ambulated in betancourt tolerated activity well.     1305 Discharge instructions given.     1420 Discharged per wheelchair, stable condition.

## 2024-03-12 NOTE — PROGRESS NOTES
0957 Patient received in IR for procedure with family taken to main radiology. Pt denies any pain in lower extremities at this time. Bilateral feet red and warm to touch.   1005 This procedure has been fully reviewed with the patient and written informed consent has been obtained.   1012 Patient prepped for procedure.  1024 Procedure started with Dr. Whittington.  1025 Access obtained in left femoral artery with use of sonosite.   1027 Micropuncture exchanged for 6Fr ANL1 sheath.    1050 Angioplasty of SFA using [place] 35 6 x 200 balloon.   1100 Angioplasty of SFA using [place] 35 6 x 120 balloon.   1112 6Fr ANL1 exchanged for 7Fr ANL1 sheath.   1116 Supera 5.5 x 150 stent deployed to SFA per Dr Whittington.   1120 Supera 5.5 x 150 stent deployed to SFA per Dr Whittington.   1122 Post stent dilation using [place] 35 6 x 120 balloon.   1127 Angioplasty of SFA using [place] 35 6 x 120 balloon.   1136 Family called to consult room.  1137 Procedure completed; patient tolerated well. Angio seal device deployed to left femoral artery.   1140 Gauze and op site to left femoral site; area soft to touch with no bleeding noted.   1146 Dr. Whittington spoke to patient's family.  1149 Patient on bed; comfort ensured. Left femoral dressing remains dry and intact with area soft.   1151 Patient taken to 2E via bed with family at bedside. Left femoral dressing remains dry and intact with area soft. Bedside report given to Maribeth SIFUENTES on 2E.

## 2024-03-12 NOTE — H&P
[facility] [place]  Sedation/Analgesia History & Physical    Pt Name: Tereso Hathaway  MRN: [id number]  YOB: 1960  Provider Performing Procedure: Jerry Whittington MD, MD  Primary Care Physician: Reji Negrete DO    Formulation and discussion of sedation / procedure plans, risks, benefits, side effects and alternatives with patient and/or responsible adult completed.    PRE-PROCEDURE   DNR-CCA/DNR-CC []Yes [x]No  Brief History/Pre-Procedure Diagnosis: peripheral vascular disease           MEDICAL HISTORY  []CAD/Valve  []Liver Disease  []Lung Disease []Diabetes  []Hypertension []Renal Disease  []Additional information:       has a past medical history of CAD (coronary artery disease), CHF (congestive heart failure) (HCC), COPD (chronic obstructive pulmonary disease) (HCC), Diabetes mellitus (HCC), Hyperlipidemia, Medtronic dual ICD, PONV (postoperative nausea and vomiting), and Sleep apnea.    SURGICAL HISTORY   has a past surgical history that includes Cardiac catheterization (9/2/11); Coronary angioplasty with stent (3/12/10); Appendectomy; Colonoscopy; Cardiac catheterization (6/12/14); vascular surgery; and Coronary artery bypass graft (2014).  Additional information:       ALLERGIES   Allergies as of 03/12/2024    (No Known Allergies)     Additional information:       MEDICATIONS   Coumadin Use Last 5 Days [x]No []Yes  Antiplatelet drug therapy use last 5 days  []No [x]Yes  Other anticoagulant use last 5 days  [x]No []Yes    Current Outpatient Medications:     gabapentin (NEURONTIN) 600 MG tablet, Take 1 tablet by mouth 3 times daily for 90 days., Disp: 90 tablet, Rfl: 2    atorvastatin (LIPITOR) 80 MG tablet, Take 1 tablet by mouth nightly, Disp: 90 tablet, Rfl: 0    bumetanide (BUMEX) 1 MG tablet, Take 1 tablet by mouth daily, Disp: 90 tablet, Rfl: 1    Semaglutide, 1 MG/DOSE, (OZEMPIC, 1 MG/DOSE,) 4 MG/3ML SOPN, Inject 1 mg into the skin once a week, Disp: 3 mL, Rfl: 2

## 2024-03-12 NOTE — H&P
Formulation and discussion of sedation / procedure plans, risks, benefits, side effects and alternatives with patient and/or responsible adult completed.    History and Physical reviewed and unchanged.    Electronically signed by Jerry Whittington MD on 3/12/24 at 11:41 AM EDT

## 2024-03-13 ENCOUNTER — PATIENT MESSAGE (OUTPATIENT)
Dept: FAMILY MEDICINE CLINIC | Age: 64
End: 2024-03-13

## 2024-03-14 ENCOUNTER — PROCEDURE VISIT (OUTPATIENT)
Dept: CARDIOLOGY CLINIC | Age: 64
End: 2024-03-14

## 2024-03-14 ENCOUNTER — TELEPHONE (OUTPATIENT)
Dept: FAMILY MEDICINE CLINIC | Age: 64
End: 2024-03-14

## 2024-03-14 DIAGNOSIS — I50.42 CHRONIC COMBINED SYSTOLIC AND DIASTOLIC CONGESTIVE HEART FAILURE (HCC): Primary | ICD-10-CM

## 2024-03-14 RX ORDER — SEMAGLUTIDE 0.68 MG/ML
0.5 INJECTION, SOLUTION SUBCUTANEOUS WEEKLY
Qty: 3 ML | Refills: 2 | Status: SHIPPED | OUTPATIENT
Start: 2024-03-14

## 2024-03-14 NOTE — TELEPHONE ENCOUNTER
Notified through CoverMyMeds that a PA was needed for the patients Ozempic. PA submitted, waiting on determination.

## 2024-03-14 NOTE — TELEPHONE ENCOUNTER
From: Tereso Hathaway  To: Dr. Reji Negrete  Sent: 3/13/2024 3:05 PM EDT  Subject: Ozempic    How long will I have the runs after I take the Ozempic shot ? It always starts on Saturday morning and last until Mon. or Tues.   Or is there some thing I could take Pepto Bismol    Thanks

## 2024-03-15 ENCOUNTER — TELEPHONE (OUTPATIENT)
Dept: FAMILY MEDICINE CLINIC | Age: 64
End: 2024-03-15

## 2024-03-15 NOTE — TELEPHONE ENCOUNTER
The patient called and stated that he had a MARY on his right leg Tuesday.  He is having a lot of pain in his thigh and it is making it hard to walk.  The patient wants to know how long this pain will last.  Please advise.

## 2024-03-15 NOTE — TELEPHONE ENCOUNTER
David Crenshaw calling and states PA is not going through and we need to contact insurance regarding.  Aware this will be done on Monday.  Rosalva ph# [phone] phone

## 2024-03-15 NOTE — TELEPHONE ENCOUNTER
Notification received through CoverMyMeds that the patients Ozempic was approved.      Called and left a detailed msg with [facility] updating them on approval of the Ozempic.    Called and updated the patient, he voiced understanding.

## 2024-03-25 DIAGNOSIS — E78.00 PURE HYPERCHOLESTEROLEMIA: ICD-10-CM

## 2024-03-25 RX ORDER — ATORVASTATIN CALCIUM 80 MG/1
80 TABLET, FILM COATED ORAL NIGHTLY
Qty: 90 TABLET | Refills: 0 | Status: SHIPPED | OUTPATIENT
Start: 2024-03-25

## 2024-03-25 NOTE — TELEPHONE ENCOUNTER
----- Message from Tereso Hathaway sent at 3/25/2024  3:26 PM EDT -----  Regarding: Refill  Contact: [phone]  [facility]

## 2024-04-04 ENCOUNTER — HOSPITAL ENCOUNTER (OUTPATIENT)
Dept: INTERVENTIONAL RADIOLOGY/VASCULAR | Age: 64
Discharge: HOME | End: 2024-04-04
Payer: MEDICAID

## 2024-04-04 VITALS
HEART RATE: 78 BPM | SYSTOLIC BLOOD PRESSURE: 106 MMHG | TEMPERATURE: 97.8 F | DIASTOLIC BLOOD PRESSURE: 77 MMHG | HEIGHT: 70 IN | BODY MASS INDEX: 26.83 KG/M2 | RESPIRATION RATE: 17 BRPM | OXYGEN SATURATION: 97 % | WEIGHT: 187.39 LBS

## 2024-04-04 DIAGNOSIS — I10 PRIMARY HYPERTENSION: ICD-10-CM

## 2024-04-04 DIAGNOSIS — I73.9 PVD (PERIPHERAL VASCULAR DISEASE) (HCC): ICD-10-CM

## 2024-04-04 DIAGNOSIS — E78.00 PURE HYPERCHOLESTEROLEMIA: ICD-10-CM

## 2024-04-04 DIAGNOSIS — Z72.0 TOBACCO ABUSE: Primary | ICD-10-CM

## 2024-04-04 LAB
APTT PPP: 34.1 SECONDS (ref 22–38)
DEPRECATED RDW RBC AUTO: 56.7 FL (ref 35–45)
ERYTHROCYTE [DISTWIDTH] IN BLOOD BY AUTOMATED COUNT: 15 % (ref 11.5–14.5)
HCT VFR BLD AUTO: 49.5 % (ref 42–52)
HGB BLD-MCNC: 16.5 GM/DL (ref 14–18)
INR PPP: 0.96 (ref 0.85–1.13)
MCH RBC QN AUTO: 33.5 PG (ref 26–33)
MCHC RBC AUTO-ENTMCNC: 33.3 GM/DL (ref 32.2–35.5)
MCV RBC AUTO: 100.4 FL (ref 80–94)
PLATELET # BLD AUTO: 218 THOU/MM3 (ref 130–400)
PMV BLD AUTO: 9.3 FL (ref 9.4–12.4)
RBC # BLD AUTO: 4.93 MILL/MM3 (ref 4.7–6.1)
WBC # BLD AUTO: 8.1 THOU/MM3 (ref 4.8–10.8)

## 2024-04-04 PROCEDURE — 36415 COLL VENOUS BLD VENIPUNCTURE: CPT

## 2024-04-04 PROCEDURE — 85730 THROMBOPLASTIN TIME PARTIAL: CPT

## 2024-04-04 PROCEDURE — 2580000003 HC RX 258: Performed by: RADIOLOGY

## 2024-04-04 PROCEDURE — 6360000004 HC RX CONTRAST MEDICATION: Performed by: RADIOLOGY

## 2024-04-04 PROCEDURE — 2709999900 IR ANGIOGRAM EXTREMITY LEFT

## 2024-04-04 PROCEDURE — 2500000003 HC RX 250 WO HCPCS: Performed by: RADIOLOGY

## 2024-04-04 PROCEDURE — 85610 PROTHROMBIN TIME: CPT

## 2024-04-04 PROCEDURE — 37224 HC PLASTY UNI FEMPOP: CPT

## 2024-04-04 PROCEDURE — 85027 COMPLETE CBC AUTOMATED: CPT

## 2024-04-04 PROCEDURE — 6360000002 HC RX W HCPCS: Performed by: RADIOLOGY

## 2024-04-04 RX ORDER — FENTANYL CITRATE 50 UG/ML
50 INJECTION, SOLUTION INTRAMUSCULAR; INTRAVENOUS ONCE
Status: COMPLETED | OUTPATIENT
Start: 2024-04-04 | End: 2024-04-04

## 2024-04-04 RX ORDER — FENTANYL CITRATE 50 UG/ML
INJECTION, SOLUTION INTRAMUSCULAR; INTRAVENOUS PRN
Status: COMPLETED | OUTPATIENT
Start: 2024-04-04 | End: 2024-04-04

## 2024-04-04 RX ORDER — SODIUM CHLORIDE 450 MG/100ML
INJECTION, SOLUTION INTRAVENOUS CONTINUOUS
Status: DISCONTINUED | OUTPATIENT
Start: 2024-04-04 | End: 2024-04-05 | Stop reason: HOSPADM

## 2024-04-04 RX ORDER — MIDAZOLAM HYDROCHLORIDE 1 MG/ML
INJECTION INTRAMUSCULAR; INTRAVENOUS PRN
Status: COMPLETED | OUTPATIENT
Start: 2024-04-04 | End: 2024-04-04

## 2024-04-04 RX ORDER — HEPARIN SODIUM 1000 [USP'U]/ML
INJECTION, SOLUTION INTRAVENOUS; SUBCUTANEOUS PRN
Status: COMPLETED | OUTPATIENT
Start: 2024-04-04 | End: 2024-04-04

## 2024-04-04 RX ORDER — LIDOCAINE HYDROCHLORIDE 20 MG/ML
INJECTION, SOLUTION INFILTRATION; PERINEURAL PRN
Status: COMPLETED | OUTPATIENT
Start: 2024-04-04 | End: 2024-04-04

## 2024-04-04 RX ORDER — MIDAZOLAM HYDROCHLORIDE 1 MG/ML
1 INJECTION INTRAMUSCULAR; INTRAVENOUS ONCE
Status: COMPLETED | OUTPATIENT
Start: 2024-04-04 | End: 2024-04-04

## 2024-04-04 RX ADMIN — FENTANYL CITRATE 50 MCG: 50 INJECTION, SOLUTION INTRAMUSCULAR; INTRAVENOUS at 10:05

## 2024-04-04 RX ADMIN — FENTANYL CITRATE 50 MCG: 50 INJECTION, SOLUTION INTRAMUSCULAR; INTRAVENOUS at 10:10

## 2024-04-04 RX ADMIN — HEPARIN SODIUM 1000 UNITS: 1000 INJECTION INTRAVENOUS; SUBCUTANEOUS at 10:35

## 2024-04-04 RX ADMIN — MIDAZOLAM 1 MG: 1 INJECTION INTRAMUSCULAR; INTRAVENOUS at 10:10

## 2024-04-04 RX ADMIN — IOPAMIDOL 65 ML: 612 INJECTION, SOLUTION INTRAVENOUS at 10:42

## 2024-04-04 RX ADMIN — MIDAZOLAM 1 MG: 1 INJECTION INTRAMUSCULAR; INTRAVENOUS at 10:05

## 2024-04-04 RX ADMIN — FENTANYL CITRATE 50 MCG: 50 INJECTION INTRAMUSCULAR; INTRAVENOUS at 09:59

## 2024-04-04 RX ADMIN — LIDOCAINE HYDROCHLORIDE 9 ML: 20 INJECTION, SOLUTION INFILTRATION; PERINEURAL at 10:42

## 2024-04-04 RX ADMIN — SODIUM CHLORIDE: 4.5 INJECTION, SOLUTION INTRAVENOUS at 08:30

## 2024-04-04 RX ADMIN — HEPARIN SODIUM 2000 UNITS: 1000 INJECTION INTRAVENOUS; SUBCUTANEOUS at 10:11

## 2024-04-04 RX ADMIN — MIDAZOLAM 1 MG: 1 INJECTION INTRAMUSCULAR; INTRAVENOUS at 09:59

## 2024-04-04 ASSESSMENT — PAIN SCALES - GENERAL: PAINLEVEL_OUTOF10: 0

## 2024-04-04 NOTE — PROGRESS NOTES
0944 Pt in specials radiology for arteriogram left leg with possible intervention. Discussed procedure with pt and pt verbalizes understanding.   0950 Moved to table and attached to monitor.  0954 Dr Whittington to speak to pt.  0956 Right groin prepped and draped.  1010 5 fr sheath inserted in right femoral artery.  1012 6 fr sheath inserted in right femoral artery.  1014 SPO2 85%. O2 2 liters per nasal cannula applied.  1016 Angioplasty of left common femoral/profunda  artery with 6 x 60 IN.PACT AV balloon.  1023 Angioplasty of branch of the profunda with 3 x 20 [place] 35 balloon.  1028 Angioplasty of branch of profunda with 4 x 40 [place] 35 balloon.  1033 Angioplasty of profunda with 8 x 40 [place] 35 balloon.  1041 O2 off.  1045 Angio-seal right femoral artery complete. Site without redness, swelling or hematoma. 4 x 4 with op-site dressing applied.  1050 Pt positioned on bed for comfort.  1054 Transferred to  per bed. Report given to Vanessa SIFUENTES. Right groin without redness, swelling or hematoma.

## 2024-04-04 NOTE — DISCHARGE INSTRUCTIONS
Discharge instructions for Cardiac Cath Groin Approach and right foot  1.  Minimal activity the rest of the day  2.  Moderate to normal activity the next day  3.  No lifting of 5 lbs or more for 5 days. - this is a gallon of milk.  4.  Can shower after 24 hours.  5.  Remove dressing in shower after 24 hours.  6.  Apply a clean band aid and antibacterial ointment for 5 days over the incision, then leave open.  7.  No bath tubs, swimming, or hot tubs for one week.  8. Watch for signs of infection - redness, increased pain, elevated temperature.  9. If bleeding from incision, apply pressure and call 911.              Normal Observation: You may or may not experience these.    Soreness or tenderness that may last a few weeks.    Possible bruising that could last a few weeks and up to one month.   Formation of a small lump (dime to quarter size) that should last only a few weeks.        Call our office immediately if you experience any of the following…  Significant bleeding does not stop after 10 minutes of applying firm pressure directly over incision.   Increased swelling of groin or leg.   Unusual pain at groin or down that leg.   Signs of infection: redness, warmth to touch, drainage, poorly healing incision, fever, or chills.

## 2024-04-04 NOTE — OP NOTE
Department of Radiology  Post Procedure Progress Note      Pre-Procedure Diagnosis:  peripheral vascular disease , occluded left SFA, stenosis left CFA and profunda     Procedure Performed:  angioplasty left CFA and profunda     Anesthesia: local / versed and fentanyl    Findings: successful    Immediate Complications:  None    Estimated Blood Loss: minimal    SEE DICTATED PROCEDURE NOTE FOR COMPLETE DETAILS.    Jerry Whittington MD   4/4/2024 10:43 AM

## 2024-04-04 NOTE — H&P
Formulation and discussion of sedation / procedure plans, risks, benefits, side effects and alternatives with patient and/or responsible adult completed.    History and Physical reviewed and unchanged.    Electronically signed by Jerry Whittington MD on 4/4/24 at 7:39 AM EDT   
tablet, Rfl: 1    metoprolol succinate (TOPROL XL) 50 MG extended release tablet, Take 1 tablet by mouth daily, Disp: 90 tablet, Rfl: 3    amLODIPine (NORVASC) 2.5 MG tablet, Take 1 tablet by mouth daily, Disp: 90 tablet, Rfl: 2    Empagliflozin-metFORMIN HCl (SYNJARDY) 5-1000 MG TABS, Take 1 tablet by mouth twice daily, Disp: 60 tablet, Rfl: 5    ENTRESTO 24-26 MG per tablet, Take 1 tablet by mouth twice daily, Disp: 180 tablet, Rfl: 1    clopidogrel (PLAVIX) 75 MG tablet, Take 1 tablet by mouth daily, Disp: 90 tablet, Rfl: 2    linagliptin (TRADJENTA) 5 MG tablet, Take 1 tablet by mouth daily, Disp: 90 tablet, Rfl: 3    albuterol (PROVENTIL) (2.5 MG/3ML) 0.083% nebulizer solution, Take 3 mLs by nebulization every 6 hours as needed for Wheezing, Disp: 120 each, Rfl: 2    nitroGLYCERIN (NITROSTAT) 0.4 MG SL tablet, Place 1 tablet under the tongue every 5 minutes as needed for Chest pain, Disp: 25 tablet, Rfl: 3    aspirin 325 MG tablet, Take 1 tablet by mouth daily, Disp: , Rfl:     glucose blood VI test strips (FREESTYLE LITE) strip, TEST twice a day, Disp: 100 strip, Rfl: 3    FREESTYLE LANCETS MISC, TEST twice a day, Disp: 100 each, Rfl: 3    Current Facility-Administered Medications:     0.45 % sodium chloride infusion, , IntraVENous, Continuous, Jerry Whittington MD    fentaNYL (SUBLIMAZE) injection 50 mcg, 50 mcg, IntraVENous, Once, Jerry Whittington MD    iopamidol (ISOVUE-300) 61 % injection 100 mL, 100 mL, IntraVENous, Once, Jerry Whittington MD    midazolam (VERSED) injection 1 mg, 1 mg, IntraVENous, Once, Jerry Whittington MD  Prior to Admission medications    Medication Sig Start Date End Date Taking? Authorizing Provider   atorvastatin (LIPITOR) 80 MG tablet Take 1 tablet by mouth nightly 3/25/24   Jaelyn Castaneda MD   Semaglutide,0.25 or 0.5MG/DOS, (OZEMPIC, 0.25 OR 0.5 MG/DOSE,) 2 MG/3ML SOPN Inject 0.5 mg into the skin once a week 3/14/24   Reji Negrete,    gabapentin (NEURONTIN) 600 MG tablet

## 2024-04-11 ENCOUNTER — HOSPITAL ENCOUNTER (OUTPATIENT)
Dept: INTERVENTIONAL RADIOLOGY/VASCULAR | Age: 64
Discharge: HOME | End: 2024-04-11
Payer: MEDICAID

## 2024-04-11 DIAGNOSIS — I73.9 PVD (PERIPHERAL VASCULAR DISEASE) (HCC): ICD-10-CM

## 2024-04-11 DIAGNOSIS — I10 PRIMARY HYPERTENSION: ICD-10-CM

## 2024-04-11 DIAGNOSIS — E78.00 PURE HYPERCHOLESTEROLEMIA: ICD-10-CM

## 2024-04-11 DIAGNOSIS — Z72.0 TOBACCO ABUSE: ICD-10-CM

## 2024-04-11 PROCEDURE — 99212 OFFICE O/P EST SF 10 MIN: CPT

## 2024-04-17 ENCOUNTER — PROCEDURE VISIT (OUTPATIENT)
Dept: CARDIOLOGY CLINIC | Age: 64
End: 2024-04-17

## 2024-04-17 DIAGNOSIS — I50.42 CHRONIC COMBINED SYSTOLIC AND DIASTOLIC CONGESTIVE HEART FAILURE (HCC): Primary | ICD-10-CM

## 2024-04-17 NOTE — PROGRESS NOTES
[facility] Medtronic Dual ICD Optivol  Pt of Leonel    Battery 12 years    Optivol WNL

## 2024-04-29 RX ORDER — HYDROXYZINE HYDROCHLORIDE 25 MG/1
25 TABLET, FILM COATED ORAL NIGHTLY
Qty: 30 TABLET | Refills: 0 | Status: SHIPPED | OUTPATIENT
Start: 2024-04-29

## 2024-05-05 RX ORDER — SACUBITRIL AND VALSARTAN 24; 26 MG/1; MG/1
TABLET, FILM COATED ORAL
Qty: 180 TABLET | Refills: 3 | Status: SHIPPED | OUTPATIENT
Start: 2024-05-05

## 2024-05-10 ENCOUNTER — NURSE ONLY (OUTPATIENT)
Dept: LAB | Age: 64
End: 2024-05-10

## 2024-05-10 DIAGNOSIS — R00.2 INTERMITTENT PALPITATIONS: ICD-10-CM

## 2024-05-10 DIAGNOSIS — Z95.1 S/P CABG X 2: ICD-10-CM

## 2024-05-10 DIAGNOSIS — I47.29 NSVT (NONSUSTAINED VENTRICULAR TACHYCARDIA) (HCC): ICD-10-CM

## 2024-05-10 DIAGNOSIS — I25.5 ISCHEMIC CARDIOMYOPATHY: ICD-10-CM

## 2024-05-10 DIAGNOSIS — Z95.810 ICD (IMPLANTABLE CARDIOVERTER-DEFIBRILLATOR) IN PLACE: ICD-10-CM

## 2024-05-10 DIAGNOSIS — E11.65 UNCONTROLLED TYPE 2 DIABETES MELLITUS WITH HYPERGLYCEMIA, WITHOUT LONG-TERM CURRENT USE OF INSULIN (HCC): ICD-10-CM

## 2024-05-10 DIAGNOSIS — I25.10 CORONARY ARTERY DISEASE INVOLVING NATIVE CORONARY ARTERY OF NATIVE HEART WITHOUT ANGINA PECTORIS: ICD-10-CM

## 2024-05-10 DIAGNOSIS — I10 PRIMARY HYPERTENSION: ICD-10-CM

## 2024-05-10 DIAGNOSIS — Z72.0 TOBACCO ABUSE: ICD-10-CM

## 2024-05-10 DIAGNOSIS — E78.00 PURE HYPERCHOLESTEROLEMIA: ICD-10-CM

## 2024-05-10 DIAGNOSIS — I50.42 CHRONIC COMBINED SYSTOLIC AND DIASTOLIC CONGESTIVE HEART FAILURE (HCC): ICD-10-CM

## 2024-05-10 LAB
ANION GAP SERPL CALC-SCNC: 13 MEQ/L (ref 8–16)
BUN SERPL-MCNC: 20 MG/DL (ref 7–22)
CALCIUM SERPL-MCNC: 9.7 MG/DL (ref 8.5–10.5)
CHLORIDE SERPL-SCNC: 102 MEQ/L (ref 98–111)
CO2 SERPL-SCNC: 23 MEQ/L (ref 23–33)
CREAT SERPL-MCNC: 0.9 MG/DL (ref 0.4–1.2)
DEPRECATED MEAN GLUCOSE BLD GHB EST-ACNC: 147 MG/DL (ref 70–126)
GFR SERPL CREATININE-BSD FRML MDRD: > 90 ML/MIN/1.73M2
GLUCOSE SERPL-MCNC: 161 MG/DL (ref 70–108)
HBA1C MFR BLD HPLC: 6.9 % (ref 4.4–6.4)
MAGNESIUM SERPL-MCNC: 2.4 MG/DL (ref 1.6–2.4)
POTASSIUM SERPL-SCNC: 4.5 MEQ/L (ref 3.5–5.2)
SODIUM SERPL-SCNC: 138 MEQ/L (ref 135–145)

## 2024-05-14 ENCOUNTER — OFFICE VISIT (OUTPATIENT)
Dept: FAMILY MEDICINE CLINIC | Age: 64
End: 2024-05-14
Payer: MEDICAID

## 2024-05-14 VITALS
RESPIRATION RATE: 16 BRPM | HEART RATE: 68 BPM | BODY MASS INDEX: 27.03 KG/M2 | DIASTOLIC BLOOD PRESSURE: 76 MMHG | SYSTOLIC BLOOD PRESSURE: 124 MMHG | WEIGHT: 188.4 LBS

## 2024-05-14 DIAGNOSIS — J44.9 CHRONIC OBSTRUCTIVE PULMONARY DISEASE, UNSPECIFIED COPD TYPE (HCC): ICD-10-CM

## 2024-05-14 DIAGNOSIS — E11.40 DIABETIC NEUROPATHY, PAINFUL (HCC): ICD-10-CM

## 2024-05-14 DIAGNOSIS — Z95.1 S/P CABG X 2: ICD-10-CM

## 2024-05-14 DIAGNOSIS — Z72.0 TOBACCO ABUSE: ICD-10-CM

## 2024-05-14 DIAGNOSIS — Z12.5 SCREENING FOR PROSTATE CANCER: ICD-10-CM

## 2024-05-14 DIAGNOSIS — E78.00 PURE HYPERCHOLESTEROLEMIA: ICD-10-CM

## 2024-05-14 DIAGNOSIS — I25.10 CORONARY ARTERY DISEASE INVOLVING NATIVE CORONARY ARTERY OF NATIVE HEART WITHOUT ANGINA PECTORIS: ICD-10-CM

## 2024-05-14 DIAGNOSIS — I10 ESSENTIAL HYPERTENSION: ICD-10-CM

## 2024-05-14 DIAGNOSIS — E11.9 CONTROLLED TYPE 2 DIABETES MELLITUS WITHOUT COMPLICATION, WITHOUT LONG-TERM CURRENT USE OF INSULIN (HCC): Primary | ICD-10-CM

## 2024-05-14 PROCEDURE — 4004F PT TOBACCO SCREEN RCVD TLK: CPT | Performed by: FAMILY MEDICINE

## 2024-05-14 PROCEDURE — 3023F SPIROM DOC REV: CPT | Performed by: FAMILY MEDICINE

## 2024-05-14 PROCEDURE — G8419 CALC BMI OUT NRM PARAM NOF/U: HCPCS | Performed by: FAMILY MEDICINE

## 2024-05-14 PROCEDURE — G8427 DOCREV CUR MEDS BY ELIG CLIN: HCPCS | Performed by: FAMILY MEDICINE

## 2024-05-14 PROCEDURE — 3078F DIAST BP <80 MM HG: CPT | Performed by: FAMILY MEDICINE

## 2024-05-14 PROCEDURE — 3017F COLORECTAL CA SCREEN DOC REV: CPT | Performed by: FAMILY MEDICINE

## 2024-05-14 PROCEDURE — 3074F SYST BP LT 130 MM HG: CPT | Performed by: FAMILY MEDICINE

## 2024-05-14 PROCEDURE — 99214 OFFICE O/P EST MOD 30 MIN: CPT | Performed by: FAMILY MEDICINE

## 2024-05-14 PROCEDURE — 2022F DILAT RTA XM EVC RTNOPTHY: CPT | Performed by: FAMILY MEDICINE

## 2024-05-14 PROCEDURE — 3044F HG A1C LEVEL LT 7.0%: CPT | Performed by: FAMILY MEDICINE

## 2024-05-14 PROCEDURE — G2211 COMPLEX E/M VISIT ADD ON: HCPCS | Performed by: FAMILY MEDICINE

## 2024-05-14 ASSESSMENT — ENCOUNTER SYMPTOMS
RESPIRATORY NEGATIVE: 1
GASTROINTESTINAL NEGATIVE: 1

## 2024-05-14 NOTE — PATIENT INSTRUCTIONS
span over three weeks.  The respiratory therapist schedules one of the appointments to occur 48 hours after the patient’s quit date.   Cost $100 total for the four sessions.  Tobacco cessation products are not included in the cost and are not provided by [facility].

## 2024-05-14 NOTE — PROGRESS NOTES
cancer  -     PSA Screening; Future    -  Chronic medical problems stable  -  Labs reviewed, look fine overall  -  Continue current medications  -  Follow up with specialists as scheduled'  -  Recheck labs 6 mos    Return in about 6 months (around 11/14/2024) for DM2.             An electronic signature was used to authenticate this note.    --Reji Negrete, DO

## 2024-05-20 ENCOUNTER — NURSE ONLY (OUTPATIENT)
Dept: CARDIOLOGY CLINIC | Age: 64
End: 2024-05-20

## 2024-05-20 DIAGNOSIS — Z95.810 ICD (IMPLANTABLE CARDIOVERTER-DEFIBRILLATOR) IN PLACE: Primary | ICD-10-CM

## 2024-05-20 RX ORDER — GABAPENTIN 600 MG/1
600 TABLET ORAL 3 TIMES DAILY
Qty: 90 TABLET | Refills: 2 | Status: SHIPPED | OUTPATIENT
Start: 2024-05-20 | End: 2024-08-18

## 2024-05-20 NOTE — PROGRESS NOTES
DR PHIPPS PT     MEDTRONIC DUAL ICD CHECK IN OFFICE /CARELINK    BATTERY 11.9 YRS REMAINING    PRESENTS IN ASVS   AAI<=>DDD   A PACED 5%   V PACED 0.1%    P WAVES 4  RV WAVES 8.8    ATRIAL IMPEDENCE 494  VENT IMPEDENCE 380  RV 69    ATRIAL THRESHOLD 0.5 @ 0.4  VENT THRESHOLD 0.75 @ 0.4    ATRIAL AMPLITUDE 1.5 @ 0.4  VENT AMPLITUDE 2 @ 0.4    OPTIVOL WNL

## 2024-06-26 ENCOUNTER — PROCEDURE VISIT (OUTPATIENT)
Dept: CARDIOLOGY CLINIC | Age: 64
End: 2024-06-26

## 2024-06-26 DIAGNOSIS — I50.42 CHRONIC COMBINED SYSTOLIC AND DIASTOLIC CONGESTIVE HEART FAILURE (HCC): Primary | ICD-10-CM

## 2024-06-26 NOTE — PROGRESS NOTES
[facility] medtronic matt Castaneda pt     11.8 years on device     Optivol WNL

## 2024-07-05 ENCOUNTER — HOSPITAL ENCOUNTER (OUTPATIENT)
Dept: INTERVENTIONAL RADIOLOGY/VASCULAR | Age: 64
Discharge: HOME | End: 2024-07-05
Payer: MEDICAID

## 2024-07-05 DIAGNOSIS — I73.9 PVD (PERIPHERAL VASCULAR DISEASE) (HCC): ICD-10-CM

## 2024-07-05 DIAGNOSIS — I10 PRIMARY HYPERTENSION: ICD-10-CM

## 2024-07-05 DIAGNOSIS — E78.00 PURE HYPERCHOLESTEROLEMIA: ICD-10-CM

## 2024-07-05 DIAGNOSIS — Z72.0 TOBACCO ABUSE: ICD-10-CM

## 2024-07-05 PROCEDURE — 93925 LOWER EXTREMITY STUDY: CPT

## 2024-07-05 PROCEDURE — 99211 OFF/OP EST MAY X REQ PHY/QHP: CPT

## 2024-07-29 ENCOUNTER — OFFICE VISIT (OUTPATIENT)
Dept: CARDIOLOGY CLINIC | Age: 64
End: 2024-07-29
Payer: MEDICAID

## 2024-07-29 VITALS
HEIGHT: 71 IN | DIASTOLIC BLOOD PRESSURE: 62 MMHG | HEART RATE: 75 BPM | BODY MASS INDEX: 26.07 KG/M2 | SYSTOLIC BLOOD PRESSURE: 96 MMHG | WEIGHT: 186.2 LBS

## 2024-07-29 DIAGNOSIS — I25.5 ISCHEMIC CARDIOMYOPATHY: ICD-10-CM

## 2024-07-29 DIAGNOSIS — I10 PRIMARY HYPERTENSION: ICD-10-CM

## 2024-07-29 DIAGNOSIS — E78.00 PURE HYPERCHOLESTEROLEMIA: ICD-10-CM

## 2024-07-29 DIAGNOSIS — Z95.1 S/P CABG X 2: ICD-10-CM

## 2024-07-29 DIAGNOSIS — I50.42 CHRONIC COMBINED SYSTOLIC AND DIASTOLIC CONGESTIVE HEART FAILURE (HCC): ICD-10-CM

## 2024-07-29 DIAGNOSIS — Z72.0 TOBACCO ABUSE: ICD-10-CM

## 2024-07-29 DIAGNOSIS — R60.0 BILATERAL LEG EDEMA: ICD-10-CM

## 2024-07-29 DIAGNOSIS — Z95.810 ICD (IMPLANTABLE CARDIOVERTER-DEFIBRILLATOR) IN PLACE: ICD-10-CM

## 2024-07-29 DIAGNOSIS — I25.10 CORONARY ARTERY DISEASE INVOLVING NATIVE CORONARY ARTERY OF NATIVE HEART WITHOUT ANGINA PECTORIS: Primary | ICD-10-CM

## 2024-07-29 DIAGNOSIS — I47.29 NSVT (NONSUSTAINED VENTRICULAR TACHYCARDIA) (HCC): ICD-10-CM

## 2024-07-29 PROCEDURE — 93000 ELECTROCARDIOGRAM COMPLETE: CPT | Performed by: INTERNAL MEDICINE

## 2024-07-29 PROCEDURE — 3078F DIAST BP <80 MM HG: CPT | Performed by: INTERNAL MEDICINE

## 2024-07-29 PROCEDURE — 99214 OFFICE O/P EST MOD 30 MIN: CPT | Performed by: INTERNAL MEDICINE

## 2024-07-29 PROCEDURE — 3074F SYST BP LT 130 MM HG: CPT | Performed by: INTERNAL MEDICINE

## 2024-07-29 PROCEDURE — G8427 DOCREV CUR MEDS BY ELIG CLIN: HCPCS | Performed by: INTERNAL MEDICINE

## 2024-07-29 PROCEDURE — 4004F PT TOBACCO SCREEN RCVD TLK: CPT | Performed by: INTERNAL MEDICINE

## 2024-07-29 PROCEDURE — 3017F COLORECTAL CA SCREEN DOC REV: CPT | Performed by: INTERNAL MEDICINE

## 2024-07-29 PROCEDURE — G8419 CALC BMI OUT NRM PARAM NOF/U: HCPCS | Performed by: INTERNAL MEDICINE

## 2024-07-29 RX ORDER — ATORVASTATIN CALCIUM 40 MG/1
40 TABLET, FILM COATED ORAL DAILY
Qty: 90 TABLET | Refills: 3 | Status: SHIPPED | OUTPATIENT
Start: 2024-07-29

## 2024-07-29 RX ORDER — LINAGLIPTIN 5 MG/1
5 TABLET, FILM COATED ORAL DAILY
Qty: 90 TABLET | Refills: 0 | OUTPATIENT
Start: 2024-07-29

## 2024-07-29 RX ORDER — NITROGLYCERIN 0.4 MG/1
0.4 TABLET SUBLINGUAL EVERY 5 MIN PRN
Qty: 25 TABLET | Refills: 3 | Status: SHIPPED | OUTPATIENT
Start: 2024-07-29

## 2024-07-29 RX ORDER — EMPAGLIFLOZIN AND METFORMIN HYDROCHLORIDE 5; 1000 MG/1; MG/1
TABLET ORAL
Qty: 60 TABLET | Refills: 5 | Status: SHIPPED | OUTPATIENT
Start: 2024-07-29

## 2024-07-29 RX ORDER — CLOPIDOGREL BISULFATE 75 MG/1
75 TABLET ORAL DAILY
Qty: 90 TABLET | Refills: 2 | Status: SHIPPED | OUTPATIENT
Start: 2024-07-29

## 2024-07-29 RX ORDER — CLOPIDOGREL BISULFATE 75 MG/1
75 TABLET ORAL DAILY
Qty: 90 TABLET | Refills: 0 | OUTPATIENT
Start: 2024-07-29

## 2024-07-29 NOTE — PROGRESS NOTES
drug-eluting stents,  reducing the stenosis from 90% to 0% into the OM.  10.  JOSE-2 flow in the distal circumflex.  11.  Successful deployment of the Angio-Seal closure device.     RECOMMENDATIONS:  At this point, we recommend to continue aggressive  medical treatment, risk factor modification and periodic followup.     The patient needs to be on dual antiplatelet treatments.  The patient  could be considered for intervention of the ostium and the proximal LAD  to supply large septal and diagonal branch.  The patient will be  monitored overnight.           OLIVIA GRAYSON M.D.     D: 10/25/2022 12:50:3    EKG 4/2023 nSR, no acute abn    EKG 7/29/2024  Sinus Rhythm   -RSR(V1) -nondiagnostic.  -Left atrial enlargement.  Low voltage -possible pulmonary disease.  ABNORMAL        Assessment   Diagnosis Orders   1. Coronary artery disease involving native coronary artery of native heart without angina pectoris  Hepatic Function Panel    Lipid Panel    Basic Metabolic Panel    Magnesium      2. Ischemic cardiomyopathy  Hepatic Function Panel    Lipid Panel    Basic Metabolic Panel    Magnesium      3. Chronic combined systolic and diastolic congestive heart failure (HCC)  Hepatic Function Panel    Lipid Panel    Basic Metabolic Panel    Magnesium      4. Primary hypertension  EKG 12 Lead    Hepatic Function Panel    Lipid Panel    Basic Metabolic Panel    Magnesium      5. Pure hypercholesterolemia  Hepatic Function Panel    Lipid Panel    Basic Metabolic Panel    Magnesium      6. Bilateral leg edema +1 TO +2  Hepatic Function Panel    Lipid Panel    Basic Metabolic Panel    Magnesium      7. Medtronic dual ICD  Hepatic Function Panel    Lipid Panel    Basic Metabolic Panel    Magnesium      8. NSVT (nonsustained ventricular tachycardia) (HCC) on ICD check 2024  Hepatic Function Panel    Lipid Panel    Basic Metabolic Panel    Magnesium      9. S/P CABG x 2  Hepatic Function Panel    Lipid Panel    Basic Metabolic Panel

## 2024-07-30 ENCOUNTER — PROCEDURE VISIT (OUTPATIENT)
Dept: CARDIOLOGY CLINIC | Age: 64
End: 2024-07-30

## 2024-07-30 DIAGNOSIS — I50.42 CHRONIC COMBINED SYSTOLIC AND DIASTOLIC CONGESTIVE HEART FAILURE (HCC): Primary | ICD-10-CM

## 2024-08-05 RX ORDER — LINAGLIPTIN 5 MG/1
5 TABLET, FILM COATED ORAL DAILY
Qty: 90 TABLET | Refills: 0 | OUTPATIENT
Start: 2024-08-05

## 2024-08-12 ENCOUNTER — OFFICE VISIT (OUTPATIENT)
Dept: FAMILY MEDICINE CLINIC | Age: 64
End: 2024-08-12
Payer: MEDICAID

## 2024-08-12 VITALS
RESPIRATION RATE: 16 BRPM | HEART RATE: 60 BPM | WEIGHT: 189 LBS | DIASTOLIC BLOOD PRESSURE: 60 MMHG | BODY MASS INDEX: 26.74 KG/M2 | SYSTOLIC BLOOD PRESSURE: 108 MMHG

## 2024-08-12 DIAGNOSIS — E11.40 DIABETIC NEUROPATHY, PAINFUL (HCC): ICD-10-CM

## 2024-08-12 DIAGNOSIS — I42.9 CARDIOMYOPATHY, UNSPECIFIED TYPE (HCC): ICD-10-CM

## 2024-08-12 DIAGNOSIS — R20.2 PARESTHESIA OF BOTH HANDS: ICD-10-CM

## 2024-08-12 DIAGNOSIS — R20.2 PARESTHESIA OF BOTH LOWER EXTREMITIES: Primary | ICD-10-CM

## 2024-08-12 DIAGNOSIS — I73.9 PVD (PERIPHERAL VASCULAR DISEASE) (HCC): ICD-10-CM

## 2024-08-12 DIAGNOSIS — J44.9 CHRONIC OBSTRUCTIVE PULMONARY DISEASE, UNSPECIFIED COPD TYPE (HCC): ICD-10-CM

## 2024-08-12 DIAGNOSIS — E11.9 CONTROLLED TYPE 2 DIABETES MELLITUS WITHOUT COMPLICATION, WITHOUT LONG-TERM CURRENT USE OF INSULIN (HCC): ICD-10-CM

## 2024-08-12 DIAGNOSIS — I25.10 CORONARY ARTERY DISEASE INVOLVING NATIVE CORONARY ARTERY OF NATIVE HEART WITHOUT ANGINA PECTORIS: ICD-10-CM

## 2024-08-12 PROCEDURE — 2022F DILAT RTA XM EVC RTNOPTHY: CPT | Performed by: FAMILY MEDICINE

## 2024-08-12 PROCEDURE — 3023F SPIROM DOC REV: CPT | Performed by: FAMILY MEDICINE

## 2024-08-12 PROCEDURE — 3017F COLORECTAL CA SCREEN DOC REV: CPT | Performed by: FAMILY MEDICINE

## 2024-08-12 PROCEDURE — 3044F HG A1C LEVEL LT 7.0%: CPT | Performed by: FAMILY MEDICINE

## 2024-08-12 PROCEDURE — G8427 DOCREV CUR MEDS BY ELIG CLIN: HCPCS | Performed by: FAMILY MEDICINE

## 2024-08-12 PROCEDURE — 99214 OFFICE O/P EST MOD 30 MIN: CPT | Performed by: FAMILY MEDICINE

## 2024-08-12 PROCEDURE — G2211 COMPLEX E/M VISIT ADD ON: HCPCS | Performed by: FAMILY MEDICINE

## 2024-08-12 PROCEDURE — 3074F SYST BP LT 130 MM HG: CPT | Performed by: FAMILY MEDICINE

## 2024-08-12 PROCEDURE — 3078F DIAST BP <80 MM HG: CPT | Performed by: FAMILY MEDICINE

## 2024-08-12 PROCEDURE — G8419 CALC BMI OUT NRM PARAM NOF/U: HCPCS | Performed by: FAMILY MEDICINE

## 2024-08-12 PROCEDURE — 4004F PT TOBACCO SCREEN RCVD TLK: CPT | Performed by: FAMILY MEDICINE

## 2024-08-12 RX ORDER — GABAPENTIN 800 MG/1
800 TABLET ORAL 3 TIMES DAILY
Qty: 90 TABLET | Refills: 0 | Status: SHIPPED | OUTPATIENT
Start: 2024-08-12 | End: 2024-09-11

## 2024-08-12 ASSESSMENT — ENCOUNTER SYMPTOMS
GASTROINTESTINAL NEGATIVE: 1
RESPIRATORY NEGATIVE: 1

## 2024-08-12 NOTE — PROGRESS NOTES
60 tablet 5    nitroGLYCERIN (NITROSTAT) 0.4 MG SL tablet Place 1 tablet under the tongue every 5 minutes as needed for Chest pain 25 tablet 3    clopidogrel (PLAVIX) 75 MG tablet Take 1 tablet by mouth daily 90 tablet 2    atorvastatin (LIPITOR) 40 MG tablet Take 1 tablet by mouth daily 90 tablet 3    Semaglutide, 1 MG/DOSE, (OZEMPIC) 4 MG/3ML SOPN sc injection Inject 1 mg into the skin every 7 days 3 mL 2    ENTRESTO 24-26 MG per tablet Take 1 tablet by mouth twice daily 180 tablet 3    hydrOXYzine HCl (ATARAX) 25 MG tablet Take 1 tablet by mouth at bedtime 30 tablet 0    bumetanide (BUMEX) 1 MG tablet Take 1 tablet by mouth daily 90 tablet 1    metoprolol succinate (TOPROL XL) 50 MG extended release tablet Take 1 tablet by mouth daily 90 tablet 3    albuterol (PROVENTIL) (2.5 MG/3ML) 0.083% nebulizer solution Take 3 mLs by nebulization every 6 hours as needed for Wheezing 120 each 2    aspirin 325 MG tablet Take 1 tablet by mouth daily      glucose blood VI test strips (FREESTYLE LITE) strip TEST twice a day 100 strip 3    FREESTYLE LANCETS MISC TEST twice a day 100 each 3    linagliptin (TRADJENTA) 5 MG tablet Take 1 tablet by mouth daily (Patient not taking: Reported on 8/12/2024)       No current facility-administered medications for this visit.       Past Surgical History:   Procedure Laterality Date    APPENDECTOMY      CARDIAC CATHETERIZATION  09/02/2011    CARDIAC CATHETERIZATION  06/12/2014    [facility]    COLONOSCOPY      CORONARY ANGIOPLASTY WITH STENT PLACEMENT  03/12/2010    [facility]    CORONARY ARTERY BYPASS GRAFT  2014    [facility]    VASCULAR SURGERY Right     Dr. Arellano 2024       Review of Systems   Constitutional: Negative.    HENT: Negative.     Respiratory: Negative.     Cardiovascular: Negative.    Gastrointestinal: Negative.    Musculoskeletal: Negative.    Neurological:  Positive for numbness (bilateral hands and feet). Negative for weakness.   All other systems reviewed and are negative.

## 2024-08-14 RX ORDER — SEMAGLUTIDE 1.34 MG/ML
INJECTION, SOLUTION SUBCUTANEOUS
Qty: 3 ML | Refills: 5 | Status: SHIPPED | OUTPATIENT
Start: 2024-08-14

## 2024-09-02 PROCEDURE — 93296 REM INTERROG EVL PM/IDS: CPT | Performed by: INTERNAL MEDICINE

## 2024-09-02 PROCEDURE — 93295 DEV INTERROG REMOTE 1/2/MLT: CPT | Performed by: INTERNAL MEDICINE

## 2024-09-03 ENCOUNTER — PROCEDURE VISIT (OUTPATIENT)
Dept: CARDIOLOGY CLINIC | Age: 64
End: 2024-09-03
Payer: MEDICAID

## 2024-09-03 DIAGNOSIS — Z95.810 ICD (IMPLANTABLE CARDIOVERTER-DEFIBRILLATOR) IN PLACE: Primary | ICD-10-CM

## 2024-09-03 NOTE — PROGRESS NOTES
[facility] Medtronic Dual ICD   Pt of Leonel    Battery 11.7 years     Presenting rhythm AS VS    A Impedance 456  RV Impedance 361    RV shock 65    P wave sensing 2.8  R wave sensing 5.4    A Threshold 0.625 @ 0.4  A Amplitude 1.5 @ 0.4  RV Thresholds 0.75 @ 0.4  RV Amplitude 2 @ 0.4    A Paced 2.2%  V Paced <0.1%    Programmed Mode AAI <=> DDD      Afib [place] 0%    Episodes: NS VT             Optivol WNL

## 2024-09-05 DIAGNOSIS — R20.2 PARESTHESIA OF BOTH LOWER EXTREMITIES: ICD-10-CM

## 2024-09-05 RX ORDER — GABAPENTIN 800 MG/1
800 TABLET ORAL 3 TIMES DAILY
Qty: 90 TABLET | Refills: 5 | Status: SHIPPED | OUTPATIENT
Start: 2024-09-05 | End: 2025-03-04

## 2024-09-12 ENCOUNTER — PATIENT MESSAGE (OUTPATIENT)
Dept: FAMILY MEDICINE CLINIC | Age: 64
End: 2024-09-12

## 2024-09-13 ENCOUNTER — TELEMEDICINE (OUTPATIENT)
Dept: FAMILY MEDICINE CLINIC | Age: 64
End: 2024-09-13

## 2024-09-13 DIAGNOSIS — E11.40 DIABETIC NEUROPATHY, PAINFUL (HCC): Primary | ICD-10-CM

## 2024-09-13 RX ORDER — DULOXETIN HYDROCHLORIDE 30 MG/1
30 CAPSULE, DELAYED RELEASE ORAL DAILY
Qty: 30 CAPSULE | Refills: 0 | Status: SHIPPED | OUTPATIENT
Start: 2024-09-13

## 2024-09-13 ASSESSMENT — ENCOUNTER SYMPTOMS
GASTROINTESTINAL NEGATIVE: 1
RESPIRATORY NEGATIVE: 1

## 2024-09-24 ENCOUNTER — PROCEDURE VISIT (OUTPATIENT)
Dept: NEUROLOGY | Age: 64
End: 2024-09-24
Payer: MEDICAID

## 2024-09-24 DIAGNOSIS — M79.604 RIGHT LEG PAIN: Primary | ICD-10-CM

## 2024-09-24 DIAGNOSIS — G62.9 NEUROPATHY: ICD-10-CM

## 2024-09-24 DIAGNOSIS — R20.0 RIGHT LEG NUMBNESS: ICD-10-CM

## 2024-09-24 DIAGNOSIS — M54.16 LUMBAR RADICULOPATHY: ICD-10-CM

## 2024-09-24 PROCEDURE — 95908 NRV CNDJ TST 3-4 STUDIES: CPT | Performed by: PSYCHIATRY & NEUROLOGY

## 2024-09-24 PROCEDURE — 95886 MUSC TEST DONE W/N TEST COMP: CPT | Performed by: PSYCHIATRY & NEUROLOGY

## 2024-09-30 ENCOUNTER — TELEMEDICINE (OUTPATIENT)
Dept: FAMILY MEDICINE CLINIC | Age: 64
End: 2024-09-30
Payer: MEDICAID

## 2024-09-30 ENCOUNTER — TELEPHONE (OUTPATIENT)
Dept: CARDIOLOGY CLINIC | Age: 64
End: 2024-09-30

## 2024-09-30 ENCOUNTER — TELEPHONE (OUTPATIENT)
Dept: FAMILY MEDICINE CLINIC | Age: 64
End: 2024-09-30

## 2024-09-30 DIAGNOSIS — M54.17 LUMBOSACRAL RADICULOPATHY AT L5: Primary | ICD-10-CM

## 2024-09-30 PROCEDURE — 4004F PT TOBACCO SCREEN RCVD TLK: CPT | Performed by: FAMILY MEDICINE

## 2024-09-30 PROCEDURE — G2211 COMPLEX E/M VISIT ADD ON: HCPCS | Performed by: FAMILY MEDICINE

## 2024-09-30 PROCEDURE — G8419 CALC BMI OUT NRM PARAM NOF/U: HCPCS | Performed by: FAMILY MEDICINE

## 2024-09-30 PROCEDURE — 3017F COLORECTAL CA SCREEN DOC REV: CPT | Performed by: FAMILY MEDICINE

## 2024-09-30 PROCEDURE — G8428 CUR MEDS NOT DOCUMENT: HCPCS | Performed by: FAMILY MEDICINE

## 2024-09-30 PROCEDURE — 99213 OFFICE O/P EST LOW 20 MIN: CPT | Performed by: FAMILY MEDICINE

## 2024-09-30 RX ORDER — HYDROXYZINE HYDROCHLORIDE 25 MG/1
25 TABLET, FILM COATED ORAL NIGHTLY
Qty: 30 TABLET | Refills: 0 | Status: SHIPPED | OUTPATIENT
Start: 2024-09-30

## 2024-09-30 ASSESSMENT — ENCOUNTER SYMPTOMS
RESPIRATORY NEGATIVE: 1
GASTROINTESTINAL NEGATIVE: 1

## 2024-09-30 NOTE — TELEPHONE ENCOUNTER
----- Message from Dr. Reji Negrete DO sent at 9/30/2024  8:44 AM EDT -----  VV with Gera this morning.  OIO referral on printer.

## 2024-09-30 NOTE — PROGRESS NOTES
Tereso Hathaway (:  1960) is a Established patient, here for evaluation of the following:    Subjective   HPI:  ,  Chief Complaint   Patient presents with    Leg Pain     Pt presents to review EMG.    Patient Active Problem List   Diagnosis    COPD (chronic obstructive pulmonary disease) (HCC)    CAD (coronary artery disease)    Post PTCA with MI    Hyperlipidemia    HTN (hypertension)    Tobacco abuse    Polyp of colon    Abnormal stress ECG with treadmill    Diabetes mellitus type II, uncontrolled    S/P CABG x 2    Uncontrolled type 2 diabetes mellitus, without long-term current use of insulin    Mild left ventricular systolic dysfunction    Ventricular tachycardia (HCC)    Ischemic cardiomyopathy    Medtronic dual ICD    Bronchitis    Tobacco dependence syndrome    NSVT (nonsustained ventricular tachycardia) (HCC) on ICD check     Intermittent palpitations    Chronic combined systolic and diastolic congestive heart failure (HCC)    Bilateral leg edema +1 TO +2     Past Surgical History:   Procedure Laterality Date    APPENDECTOMY      CARDIAC CATHETERIZATION  2011    CARDIAC CATHETERIZATION  2014    [facility]    COLONOSCOPY      CORONARY ANGIOPLASTY WITH STENT PLACEMENT  2010    [facility]    CORONARY ARTERY BYPASS GRAFT      [facility]    VASCULAR SURGERY Right     Dr. Arellano      Social History     Tobacco Use    Smoking status: Every Day     Current packs/day: 0.25     Average packs/day: 0.3 packs/day for 48.0 years (16.0 ttl pk-yrs)     Types: Cigarettes     Passive exposure: Past    Smokeless tobacco: Never   Vaping Use    Vaping status: Never Used   Substance Use Topics    Alcohol use: No    Drug use: No       Review of Systems   Constitutional: Negative.    HENT: Negative.     Respiratory: Negative.     Cardiovascular: Negative.    Gastrointestinal: Negative.    Musculoskeletal:  Positive for myalgias (right leg pain).   All other systems reviewed and are negative.         Objective

## 2024-10-07 ENCOUNTER — HOSPITAL ENCOUNTER (OUTPATIENT)
Dept: INTERVENTIONAL RADIOLOGY/VASCULAR | Age: 64
Discharge: HOME | End: 2024-10-09
Attending: RADIOLOGY
Payer: MEDICAID

## 2024-10-07 ENCOUNTER — TELEPHONE (OUTPATIENT)
Dept: FAMILY MEDICINE CLINIC | Age: 64
End: 2024-10-07

## 2024-10-07 ENCOUNTER — HOSPITAL ENCOUNTER (OUTPATIENT)
Dept: INTERVENTIONAL RADIOLOGY/VASCULAR | Age: 64
Discharge: HOME | End: 2024-10-07
Attending: RADIOLOGY
Payer: MEDICAID

## 2024-10-07 DIAGNOSIS — I73.9 PVD (PERIPHERAL VASCULAR DISEASE) (HCC): ICD-10-CM

## 2024-10-07 DIAGNOSIS — I73.9 PVD (PERIPHERAL VASCULAR DISEASE) (HCC): Primary | ICD-10-CM

## 2024-10-07 PROCEDURE — 93925 LOWER EXTREMITY STUDY: CPT

## 2024-10-07 PROCEDURE — 99212 OFFICE O/P EST SF 10 MIN: CPT

## 2024-10-07 NOTE — TELEPHONE ENCOUNTER
Adrienne with [facility] called office stating they just did a vascular screen on pt today. He needs a IR Angiogram extremity right leg ordered. If no call back she will check Epic after 4pm today.

## 2024-10-15 PROCEDURE — 93297 REM INTERROG DEV EVAL ICPMS: CPT | Performed by: INTERNAL MEDICINE

## 2024-10-17 ENCOUNTER — HOSPITAL ENCOUNTER (OUTPATIENT)
Dept: INTERVENTIONAL RADIOLOGY/VASCULAR | Age: 64
Discharge: HOME | End: 2024-10-17
Attending: RADIOLOGY | Admitting: RADIOLOGY
Payer: MEDICAID

## 2024-10-17 VITALS
SYSTOLIC BLOOD PRESSURE: 96 MMHG | HEIGHT: 70 IN | TEMPERATURE: 97.9 F | RESPIRATION RATE: 21 BRPM | BODY MASS INDEX: 25.98 KG/M2 | WEIGHT: 181.44 LBS | DIASTOLIC BLOOD PRESSURE: 63 MMHG | HEART RATE: 78 BPM | OXYGEN SATURATION: 96 %

## 2024-10-17 DIAGNOSIS — I73.9 PVD (PERIPHERAL VASCULAR DISEASE) (HCC): ICD-10-CM

## 2024-10-17 LAB
APTT PPP: 31 SECONDS (ref 22–38)
CREAT SERPL-MCNC: 0.9 MG/DL (ref 0.4–1.2)
DEPRECATED RDW RBC AUTO: 51.1 FL (ref 35–45)
ERYTHROCYTE [DISTWIDTH] IN BLOOD BY AUTOMATED COUNT: 14.1 % (ref 11.5–14.5)
GFR SERPL CREATININE-BSD FRML MDRD: > 90 ML/MIN/1.73M2
HCT VFR BLD AUTO: 46 % (ref 42–52)
HGB BLD-MCNC: 15.4 GM/DL (ref 14–18)
INR PPP: 0.95 (ref 0.85–1.13)
MCH RBC QN AUTO: 32.9 PG (ref 26–33)
MCHC RBC AUTO-ENTMCNC: 33.5 GM/DL (ref 32.2–35.5)
MCV RBC AUTO: 98.3 FL (ref 80–94)
PLATELET # BLD AUTO: 231 THOU/MM3 (ref 130–400)
PMV BLD AUTO: 9 FL (ref 9.4–12.4)
RBC # BLD AUTO: 4.68 MILL/MM3 (ref 4.7–6.1)
WBC # BLD AUTO: 6.2 THOU/MM3 (ref 4.8–10.8)

## 2024-10-17 PROCEDURE — 6360000004 HC RX CONTRAST MEDICATION: Performed by: RADIOLOGY

## 2024-10-17 PROCEDURE — 6360000002 HC RX W HCPCS: Performed by: RADIOLOGY

## 2024-10-17 PROCEDURE — 85027 COMPLETE CBC AUTOMATED: CPT

## 2024-10-17 PROCEDURE — 37220 HC ILIAC TERRITORY PLASTY: CPT

## 2024-10-17 PROCEDURE — 36415 COLL VENOUS BLD VENIPUNCTURE: CPT

## 2024-10-17 PROCEDURE — 75625 CONTRAST EXAM ABDOMINL AORTA: CPT

## 2024-10-17 PROCEDURE — 37222 HC ILIAC TERRITORY ADDL PLASTY: CPT

## 2024-10-17 PROCEDURE — 85610 PROTHROMBIN TIME: CPT

## 2024-10-17 PROCEDURE — 75716 ARTERY X-RAYS ARMS/LEGS: CPT

## 2024-10-17 PROCEDURE — 2500000003 HC RX 250 WO HCPCS: Performed by: RADIOLOGY

## 2024-10-17 PROCEDURE — C1760 CLOSURE DEV, VASC: HCPCS

## 2024-10-17 PROCEDURE — 2580000003 HC RX 258: Performed by: RADIOLOGY

## 2024-10-17 PROCEDURE — 37226 HC FEM POP TERR PLASTY AND STENT: CPT

## 2024-10-17 PROCEDURE — 85730 THROMBOPLASTIN TIME PARTIAL: CPT

## 2024-10-17 PROCEDURE — 75774 ARTERY X-RAY EACH VESSEL: CPT

## 2024-10-17 PROCEDURE — 82565 ASSAY OF CREATININE: CPT

## 2024-10-17 RX ORDER — MIDAZOLAM HYDROCHLORIDE 1 MG/ML
1 INJECTION, SOLUTION INTRAMUSCULAR; INTRAVENOUS ONCE
Status: COMPLETED | OUTPATIENT
Start: 2024-10-17 | End: 2024-10-17

## 2024-10-17 RX ORDER — FENTANYL CITRATE 50 UG/ML
50 INJECTION, SOLUTION INTRAMUSCULAR; INTRAVENOUS ONCE
Status: COMPLETED | OUTPATIENT
Start: 2024-10-17 | End: 2024-10-17

## 2024-10-17 RX ORDER — LIDOCAINE HYDROCHLORIDE 20 MG/ML
5 INJECTION, SOLUTION INFILTRATION; PERINEURAL ONCE
Status: COMPLETED | OUTPATIENT
Start: 2024-10-17 | End: 2024-10-17

## 2024-10-17 RX ORDER — FENTANYL CITRATE 50 UG/ML
25 INJECTION, SOLUTION INTRAMUSCULAR; INTRAVENOUS ONCE
Status: COMPLETED | OUTPATIENT
Start: 2024-10-17 | End: 2024-10-17

## 2024-10-17 RX ORDER — IOPAMIDOL 612 MG/ML
200 INJECTION, SOLUTION INTRAVASCULAR
Status: DISCONTINUED | OUTPATIENT
Start: 2024-10-17 | End: 2024-10-17

## 2024-10-17 RX ORDER — MIDAZOLAM HYDROCHLORIDE 1 MG/ML
0.5 INJECTION, SOLUTION INTRAMUSCULAR; INTRAVENOUS ONCE
Status: COMPLETED | OUTPATIENT
Start: 2024-10-17 | End: 2024-10-17

## 2024-10-17 RX ORDER — IOPAMIDOL 612 MG/ML
300 INJECTION, SOLUTION INTRAVASCULAR
Status: COMPLETED | OUTPATIENT
Start: 2024-10-17 | End: 2024-10-17

## 2024-10-17 RX ORDER — IOPAMIDOL 612 MG/ML
100 INJECTION, SOLUTION INTRAVASCULAR ONCE
Status: DISCONTINUED | OUTPATIENT
Start: 2024-10-17 | End: 2024-10-17

## 2024-10-17 RX ORDER — SODIUM CHLORIDE 450 MG/100ML
INJECTION, SOLUTION INTRAVENOUS CONTINUOUS
Status: DISCONTINUED | OUTPATIENT
Start: 2024-10-17 | End: 2024-10-17 | Stop reason: HOSPADM

## 2024-10-17 RX ORDER — HEPARIN SODIUM 1000 [USP'U]/ML
1000 INJECTION, SOLUTION INTRAVENOUS; SUBCUTANEOUS ONCE
Status: COMPLETED | OUTPATIENT
Start: 2024-10-17 | End: 2024-10-17

## 2024-10-17 RX ORDER — HEPARIN SODIUM 1000 [USP'U]/ML
3000 INJECTION, SOLUTION INTRAVENOUS; SUBCUTANEOUS ONCE
Status: COMPLETED | OUTPATIENT
Start: 2024-10-17 | End: 2024-10-17

## 2024-10-17 RX ADMIN — FENTANYL CITRATE 25 MCG: 50 INJECTION, SOLUTION INTRAMUSCULAR; INTRAVENOUS at 10:25

## 2024-10-17 RX ADMIN — ALTEPLASE 4 MG: 2.2 INJECTION, POWDER, LYOPHILIZED, FOR SOLUTION INTRAVENOUS at 10:54

## 2024-10-17 RX ADMIN — SODIUM CHLORIDE: 4.5 INJECTION, SOLUTION INTRAVENOUS at 08:39

## 2024-10-17 RX ADMIN — WATER 2000 MG: 1 INJECTION INTRAMUSCULAR; INTRAVENOUS; SUBCUTANEOUS at 13:05

## 2024-10-17 RX ADMIN — FENTANYL CITRATE 25 MCG: 50 INJECTION, SOLUTION INTRAMUSCULAR; INTRAVENOUS at 10:36

## 2024-10-17 RX ADMIN — HEPARIN SODIUM 1000 UNITS: 1000 INJECTION INTRAVENOUS; SUBCUTANEOUS at 10:50

## 2024-10-17 RX ADMIN — MIDAZOLAM 0.5 MG: 1 INJECTION INTRAMUSCULAR; INTRAVENOUS at 10:36

## 2024-10-17 RX ADMIN — HEPARIN SODIUM 3000 UNITS: 1000 INJECTION INTRAVENOUS; SUBCUTANEOUS at 10:14

## 2024-10-17 RX ADMIN — MIDAZOLAM 0.5 MG: 1 INJECTION INTRAMUSCULAR; INTRAVENOUS at 10:25

## 2024-10-17 RX ADMIN — IOPAMIDOL 220 ML: 612 INJECTION, SOLUTION INTRAVENOUS at 11:32

## 2024-10-17 RX ADMIN — FENTANYL CITRATE 50 MCG: 50 INJECTION, SOLUTION INTRAMUSCULAR; INTRAVENOUS at 09:28

## 2024-10-17 RX ADMIN — MIDAZOLAM 1 MG: 1 INJECTION INTRAMUSCULAR; INTRAVENOUS at 09:37

## 2024-10-17 RX ADMIN — FENTANYL CITRATE 50 MCG: 50 INJECTION, SOLUTION INTRAMUSCULAR; INTRAVENOUS at 09:37

## 2024-10-17 RX ADMIN — LIDOCAINE HYDROCHLORIDE 5 ML: 20 INJECTION, SOLUTION INFILTRATION; PERINEURAL at 11:32

## 2024-10-17 RX ADMIN — MIDAZOLAM 1 MG: 1 INJECTION INTRAMUSCULAR; INTRAVENOUS at 09:28

## 2024-10-17 ASSESSMENT — PAIN DESCRIPTION - DESCRIPTORS
DESCRIPTORS: ACHING
DESCRIPTORS: DISCOMFORT
DESCRIPTORS: DISCOMFORT
DESCRIPTORS: DISCOMFORT;THROBBING

## 2024-10-17 ASSESSMENT — PAIN DESCRIPTION - LOCATION
LOCATION: GROIN
LOCATION: LEG
LOCATION: LEG;FOOT
LOCATION: FOOT

## 2024-10-17 ASSESSMENT — PAIN DESCRIPTION - ORIENTATION
ORIENTATION: RIGHT
ORIENTATION: LEFT

## 2024-10-17 ASSESSMENT — PAIN SCALES - GENERAL
PAINLEVEL_OUTOF10: 2
PAINLEVEL_OUTOF10: 7
PAINLEVEL_OUTOF10: 6
PAINLEVEL_OUTOF10: 3

## 2024-10-17 NOTE — DISCHARGE INSTRUCTIONS
Discharge instructions for Cardiac Cath Groin Approach  1.  Take it easy for 3-4 days.  2.  No driving for 2 days.  3.  No lifting of 5 lbs or more for 5 days. - this is a gallon of milk.  4.  Can shower after 24 hours.  5.  Remove dressing in shower after 24 hours.  6.  Apply a clean band aid for 5 days over the incision, then leave open.  7.  No creams, ointments, or powders near site.  8.  No bath tubs, swimming, or hot tubs for one week.  9.  Watch for signs of infection - redness, increased pain, elevated temperature.  10 If bleeding from incision, apply pressure and call 911.          Normal Observation: You may or may not experience these.    Soreness or tenderness that may last a few weeks.    Possible bruising that could last a few weeks and up to one month.   Formation of a small lump (dime to quarter size) that should last only a few weeks.      Call our office immediately if you experience any of the following…  Significant bleeding does not stop after 10 minutes of applying firm pressure directly over incision.   Increased swelling of groin or leg.   Unusual pain at groin or down that leg.   Signs of infection: redness, warmth to touch, drainage, poorly healing incision, fever, or chills.

## 2024-10-17 NOTE — OP NOTE
Department of Radiology  Post Procedure Progress Note      Pre-Procedure Diagnosis:  peripheral vascular disease     Procedure Performed:  recanalize occluded right SFA, angioplasty and stenting     Anesthesia: local / versed and fentanyl    Findings: successful    Immediate Complications:  None    Estimated Blood Loss: minimal    SEE DICTATED PROCEDURE NOTE FOR COMPLETE DETAILS.    Jerry Whittington MD   10/17/2024 11:25 AM

## 2024-10-17 NOTE — PROGRESS NOTES
0905 Pt arrived to special procedure room 2 for right leg angiogram with run off with possible intervention under conscious sedation. Wife Barbara and daughter Rosie shown to waiting area.  0906 Procedure explained using teach back method. Pt states understanding.  0914 Dr Whittington into assess patient and explain procedure. This procedure has been fully reviewed with the patient and written informed consent has been obtained.   0919 Patient assisted to table in supine position. Monitor attached to patient. Comfort ensured.  0923 Left groin prepped using sterile technique.  0936 Pre-procedure images obtained.  0938 Procedure begins.  1017 Angioplasty performed per Dr Whittington in the right iliac and proximal SFA using a [id number] In.Pact admiral.  1027 Angioplasty performed per Dr Whittington in the right SFA/popliteal using a [id number] [place].  1035 Angioplasty performed per Dr Whittington in the right SFA using a [id number] In.Pact [place].  1042 Angioplasty performed per Dr Whittington in the right proximal SFA using a [id number] In.Pact [place].  1101 6x10 Viabahn stent deployed in the right SFA per Dr Whittington.  1103 Angioplasty performed per Dr Whittington in the right SFA using a [id number] [place].  1115 6x15 Viabahn stent deployed in the right SFA per Dr Whittington.  1118 6x15 Viabahn stent deployed in the right SFA per Dr Whittington.  1119 Angioplasty performed per Dr Whittington in the right SFA using a [id number] [place].  1124 Procedure complete. Post-procedure images obtained.  1132 Monitor removed. Patient assisted to bed in supine position. Comfort ensured.  1137 Patient transported to  in stable condition. Family and writer present at bedside.

## 2024-10-17 NOTE — H&P
[facility]  Sedation/Analgesia History & Physical    Pt Name: Tereso Hathaway  MRN: [id number]  YOB: 1960  Provider Performing Procedure: Jerry Whittington MD, MD  Primary Care Physician: Reji Negrete DO    Formulation and discussion of sedation / procedure plans, risks, benefits, side effects and alternatives with patient and/or responsible adult completed.    PRE-PROCEDURE   DNR-CCA/DNR-CC []Yes [x]No  Brief History/Pre-Procedure Diagnosis: peripheral vascular disease           MEDICAL HISTORY  []CAD/Valve  []Liver Disease  []Lung Disease []Diabetes  []Hypertension []Renal Disease  []Additional information:       has a past medical history of CAD (coronary artery disease), CHF (congestive heart failure) ([facility]), Congenital heart disease, COPD (chronic obstructive pulmonary disease) ([facility]), Diabetes mellitus ([facility]), Hyperlipidemia, Hypertension, Medtronic dual ICD, PONV (postoperative nausea and vomiting), Sleep apnea, and Type 2 diabetes mellitus without complication ([facility]).    SURGICAL HISTORY   has a past surgical history that includes Cardiac catheterization (09/02/2011); Coronary angioplasty with stent (03/12/2010); Appendectomy; Colonoscopy; Cardiac catheterization (06/12/2014); vascular surgery (Right); and Coronary artery bypass graft (2014).  Additional information:       ALLERGIES   Allergies as of 10/17/2024    (No Known Allergies)     Additional information:       MEDICATIONS   Coumadin Use Last 5 Days [x]No []Yes  Antiplatelet drug therapy use last 5 days  []No [x]Yes  Other anticoagulant use last 5 days  [x]No []Yes    Current Facility-Administered Medications:     0.45 % sodium chloride infusion, , IntraVENous, Continuous, Jerry Whittington MD    fentaNYL (SUBLIMAZE) injection 50 mcg, 50 mcg, IntraVENous, Once, Jerry Whittington MD    iopamidol (ISOVUE-300) 61 % injection 100 mL, 100 mL, IntraVENous, Once, Jerry Whittington MD    midazolam (VERSED) injection 1 mg, 1 mg,

## 2024-10-17 NOTE — FLOWSHEET NOTE
10/17/24 1415   AVS Reviewed   AVS & discharge instructions reviewed with patient and/or representative? Yes   Reviewed instructions with Patient;Other (name and relationship in comment)   Level of Understanding Questions answered;Teach back completed;Verbalized understanding

## 2024-10-17 NOTE — H&P
Formulation and discussion of sedation / procedure plans, risks, benefits, side effects and alternatives with patient and/or responsible adult completed.    History and Physical reviewed and unchanged.    Electronically signed by Jerry Whittington MD on 10/17/24 at 8:29 AM EDT

## 2024-10-18 ENCOUNTER — TELEPHONE (OUTPATIENT)
Dept: FAMILY MEDICINE CLINIC | Age: 64
End: 2024-10-18

## 2024-10-18 NOTE — TELEPHONE ENCOUNTER
Care Transitions Initial Follow Up Call    Outreach made within 2 business days of discharge: Yes    Patient: Tereso Hathaway Patient : 1960   MRN: [id number]  Reason for Admission: 10/17/2024  Discharge Date: 10/17/24       Spoke with: patient    Discharge department/facility: [facility]    TCM Interactive Patient Contact:  Was patient able to fill all prescriptions: N/A  Was patient instructed to bring all medications to the follow-up visit: Yes  Is patient taking all medications as directed in the discharge summary? Yes  Does patient understand their discharge instructions: Yes  Does patient have questions or concerns that need addressed prior to 7-14 day follow up office visit: no    Additional needs identified to be addressed with provider  No needs identified             Scheduled appointment with PCP within 7-14 days    Follow Up  Future Appointments   Date Time Provider Department Center   10/24/2024  1:30 PM STR SPECIAL PROCEDURE ROOM 1 [facility] SPEC STR Rad/Card   2024  8:00 AM Reji Negrete, DO [facility] ECC DEP   2025  1:00 PM STR VASCULAR LAB ROOM 2 [facility] VAS LAB STR Rad/Card   2025  2:30 PM STR SPECIAL PROCEDURE ROOM 1 [facility] SPEC STR Rad/Card   2025 11:30 AM Jaelyn Castaneda MD N [facility] Heart [facility] [place]   2025 11:00 AM SCHEDULE, SRPX PACER NURSE N [facility] PACER P - [place] SEQUEIRA LPN

## 2024-10-24 ENCOUNTER — HOSPITAL ENCOUNTER (OUTPATIENT)
Dept: INTERVENTIONAL RADIOLOGY/VASCULAR | Age: 64
Discharge: HOME | End: 2024-10-24
Payer: MEDICAID

## 2024-10-24 DIAGNOSIS — I73.9 PVD (PERIPHERAL VASCULAR DISEASE) (HCC): ICD-10-CM

## 2024-10-24 PROCEDURE — 99212 OFFICE O/P EST SF 10 MIN: CPT

## 2024-10-26 ENCOUNTER — PATIENT MESSAGE (OUTPATIENT)
Dept: FAMILY MEDICINE CLINIC | Age: 64
End: 2024-10-26

## 2024-10-26 DIAGNOSIS — E11.40 DIABETIC NEUROPATHY, PAINFUL (HCC): ICD-10-CM

## 2024-10-26 DIAGNOSIS — I73.9 PVD (PERIPHERAL VASCULAR DISEASE) (HCC): Primary | ICD-10-CM

## 2024-10-26 DIAGNOSIS — M54.17 LUMBOSACRAL RADICULOPATHY AT L5: ICD-10-CM

## 2024-10-28 RX ORDER — ALBUTEROL SULFATE 0.83 MG/ML
SOLUTION RESPIRATORY (INHALATION)
Qty: 375 ML | Refills: 0 | Status: SHIPPED | OUTPATIENT
Start: 2024-10-28

## 2024-11-14 ENCOUNTER — OFFICE VISIT (OUTPATIENT)
Dept: FAMILY MEDICINE CLINIC | Age: 64
End: 2024-11-14

## 2024-11-14 VITALS
SYSTOLIC BLOOD PRESSURE: 118 MMHG | DIASTOLIC BLOOD PRESSURE: 74 MMHG | BODY MASS INDEX: 27.09 KG/M2 | WEIGHT: 188.8 LBS | HEART RATE: 88 BPM | RESPIRATION RATE: 16 BRPM

## 2024-11-14 DIAGNOSIS — E11.40 DIABETIC NEUROPATHY, PAINFUL (HCC): ICD-10-CM

## 2024-11-14 DIAGNOSIS — E11.9 CONTROLLED TYPE 2 DIABETES MELLITUS WITHOUT COMPLICATION, WITHOUT LONG-TERM CURRENT USE OF INSULIN (HCC): ICD-10-CM

## 2024-11-14 DIAGNOSIS — I25.10 CORONARY ARTERY DISEASE INVOLVING NATIVE CORONARY ARTERY OF NATIVE HEART WITHOUT ANGINA PECTORIS: ICD-10-CM

## 2024-11-14 DIAGNOSIS — Z12.5 SCREENING FOR PROSTATE CANCER: ICD-10-CM

## 2024-11-14 DIAGNOSIS — R20.2 PARESTHESIA OF BOTH LOWER EXTREMITIES: ICD-10-CM

## 2024-11-14 DIAGNOSIS — E78.00 PURE HYPERCHOLESTEROLEMIA: ICD-10-CM

## 2024-11-14 DIAGNOSIS — I42.9 CARDIOMYOPATHY, UNSPECIFIED TYPE (HCC): ICD-10-CM

## 2024-11-14 DIAGNOSIS — I73.9 PVD (PERIPHERAL VASCULAR DISEASE) (HCC): Primary | ICD-10-CM

## 2024-11-14 DIAGNOSIS — I10 ESSENTIAL HYPERTENSION: ICD-10-CM

## 2024-11-14 DIAGNOSIS — J44.9 CHRONIC OBSTRUCTIVE PULMONARY DISEASE, UNSPECIFIED COPD TYPE (HCC): ICD-10-CM

## 2024-11-14 ASSESSMENT — ENCOUNTER SYMPTOMS
RESPIRATORY NEGATIVE: 1
GASTROINTESTINAL NEGATIVE: 1

## 2024-11-14 NOTE — PROGRESS NOTES
Tereso Hathaway (:  1960) is a 64 y.o. male,Established patient, here for evaluation of the following chief complaint(s):  6 Month Follow-Up (DM)          Subjective   SUBJECTIVE/OBJECTIVE:  HPI  Chief Complaint   Patient presents with    6 Month Follow-Up     DM     6 month eval.    Recently had another intervention to right leg due to blockage.      Lab Results   Component Value Date    LABA1C 6.9 (H) 05/10/2024    LABA1C 8.1 (H) 11/10/2023    LABA1C 8.4 (H) 2023     Lab Results   Component Value Date    GLUF 142 (H) 2017    MALBCR 58 (H) 2021    CREATININE 0.8 2024     BP Readings from Last 3 Encounters:   24 118/74   10/17/24 96/63   24 108/60     Wt Readings from Last 3 Encounters:   24 85.6 kg (188 lb 12.8 oz)   10/17/24 82.3 kg (181 lb 7 oz)   24 85.7 kg (189 lb)       Patient Active Problem List   Diagnosis    COPD (chronic obstructive pulmonary disease) (HCC)    CAD (coronary artery disease)    Post PTCA with MI    Hyperlipidemia    HTN (hypertension)    Tobacco abuse    Polyp of colon    Abnormal stress ECG with treadmill    Diabetes mellitus type II, uncontrolled    S/P CABG x 2    Uncontrolled type 2 diabetes mellitus, without long-term current use of insulin    Mild left ventricular systolic dysfunction    Ventricular tachycardia (HCC)    Ischemic cardiomyopathy    Medtronic dual ICD    Bronchitis    Tobacco dependence syndrome    NSVT (nonsustained ventricular tachycardia) (HCC) on ICD check     Intermittent palpitations    Chronic combined systolic and diastolic congestive heart failure (HCC)    Bilateral leg edema +1 TO +2       Current Outpatient Medications   Medication Sig Dispense Refill    albuterol (PROVENTIL) (2.5 MG/3ML) 0.083% nebulizer solution USE 1 VIAL IN NEBULIZER EVERY 6 HOURS AS NEEDED FOR WHEEZING 375 mL 0    hydrOXYzine HCl (ATARAX) 25 MG tablet TAKE 1 TABLET BY MOUTH AT BEDTIME 30 tablet 0    Semaglutide, 1 MG/DOSE,

## 2024-11-15 RX ORDER — HYDROXYZINE HYDROCHLORIDE 25 MG/1
25 TABLET, FILM COATED ORAL NIGHTLY
Qty: 30 TABLET | Refills: 0 | Status: SHIPPED | OUTPATIENT
Start: 2024-11-15

## 2024-11-27 RX ORDER — ALBUTEROL SULFATE 0.83 MG/ML
SOLUTION RESPIRATORY (INHALATION)
Qty: 375 ML | Refills: 0 | Status: SHIPPED | OUTPATIENT
Start: 2024-11-27

## 2024-12-09 ENCOUNTER — TELEPHONE (OUTPATIENT)
Dept: CARDIOLOGY CLINIC | Age: 64
End: 2024-12-09

## 2024-12-09 NOTE — TELEPHONE ENCOUNTER
Dr resendiz pt   Optivol elevated       I called this pt and he said and he denies any weight gain, sob and very little edema to legs . He said he has not gained any weight   11/23/24 10 bts ns vt / rate 178 for :03 seconds

## 2024-12-10 RX ORDER — HYDROXYZINE HYDROCHLORIDE 25 MG/1
25 TABLET, FILM COATED ORAL NIGHTLY
Qty: 30 TABLET | Refills: 2 | Status: SHIPPED | OUTPATIENT
Start: 2024-12-10

## 2025-01-10 RX ORDER — EMPAGLIFLOZIN AND METFORMIN HYDROCHLORIDE 5; 1000 MG/1; MG/1
TABLET ORAL
Qty: 60 TABLET | Refills: 5 | Status: SHIPPED | OUTPATIENT
Start: 2025-01-10

## 2025-02-03 RX ORDER — METOPROLOL SUCCINATE 50 MG/1
50 TABLET, EXTENDED RELEASE ORAL DAILY
Qty: 90 TABLET | Refills: 0 | OUTPATIENT
Start: 2025-02-03

## 2025-02-05 RX ORDER — METOPROLOL SUCCINATE 50 MG/1
50 TABLET, EXTENDED RELEASE ORAL DAILY
Qty: 90 TABLET | Refills: 0 | OUTPATIENT
Start: 2025-02-05

## 2025-02-14 RX ORDER — METOPROLOL SUCCINATE 50 MG/1
50 TABLET, EXTENDED RELEASE ORAL DAILY
Qty: 30 TABLET | Refills: 0 | Status: SHIPPED | OUTPATIENT
Start: 2025-02-14

## 2025-02-16 PROCEDURE — 93297 REM INTERROG DEV EVAL ICPMS: CPT | Performed by: INTERNAL MEDICINE

## 2025-02-24 RX ORDER — SEMAGLUTIDE 1.34 MG/ML
INJECTION, SOLUTION SUBCUTANEOUS
Qty: 3 ML | Refills: 5 | Status: SHIPPED | OUTPATIENT
Start: 2025-02-24

## 2025-02-24 RX ORDER — BUMETANIDE 1 MG/1
1 TABLET ORAL DAILY
Qty: 90 TABLET | Refills: 0 | Status: SHIPPED | OUTPATIENT
Start: 2025-02-24

## 2025-02-25 ENCOUNTER — TELEPHONE (OUTPATIENT)
Dept: FAMILY MEDICINE CLINIC | Age: 65
End: 2025-02-25

## 2025-02-25 NOTE — TELEPHONE ENCOUNTER
PA request received from pharmacy for Ozempic 1mg dose 4mg/3ml #3ml for 28 days.  PA submitted online at [email or web address] and pending review.

## 2025-02-26 NOTE — TELEPHONE ENCOUNTER
Message from Plan--Denial  Coverage is provided when the prescriber provides documentation of the members clinical response to treatment and ongoing safety monitoring including: A patient specific A1C goal if less than 7% and A current A1C (within the last 6 months). Must meet all initial clinical criteria for subsequent authorizations. The [facility] Policy for Medical Necessity as posted on the [facility] website and [facility] Preferred Drug List criteria were reviewed and per [place] Administrative Code Rule 5160-1-01 (C) and 5160-26-03 (B), a medically necessary service must include: generally accepted standards of medical practice, be clinically appropriate in administration, treatment and outcome and be the lowest cost alternative to effectively treat the condition. Please contact your provider to assist you with other treatment options that might be covered under your benefit package, or other services that might be available through the community.    Please advise

## 2025-02-26 NOTE — TELEPHONE ENCOUNTER
Notify pt, Ozempic denied likely due to the fact that he is not UTD with his A1C.  Orders were given in November, recommend getting labs done at this time and we can resubmit the Ozempic.

## 2025-03-01 ENCOUNTER — LAB (OUTPATIENT)
Dept: LAB | Age: 65
End: 2025-03-01

## 2025-03-01 DIAGNOSIS — Z12.5 SCREENING FOR PROSTATE CANCER: ICD-10-CM

## 2025-03-01 DIAGNOSIS — I10 ESSENTIAL HYPERTENSION: ICD-10-CM

## 2025-03-01 DIAGNOSIS — E78.00 PURE HYPERCHOLESTEROLEMIA: ICD-10-CM

## 2025-03-01 DIAGNOSIS — E11.9 CONTROLLED TYPE 2 DIABETES MELLITUS WITHOUT COMPLICATION, WITHOUT LONG-TERM CURRENT USE OF INSULIN (HCC): ICD-10-CM

## 2025-03-01 LAB
BASOPHILS ABSOLUTE: 0.1 THOU/MM3 (ref 0–0.1)
BASOPHILS NFR BLD AUTO: 0.9 %
CREAT UR-MCNC: 112 MG/DL
DEPRECATED MEAN GLUCOSE BLD GHB EST-ACNC: 141 MG/DL (ref 70–126)
DEPRECATED RDW RBC AUTO: 49.2 FL (ref 35–45)
EOSINOPHIL NFR BLD AUTO: 2.8 %
EOSINOPHILS ABSOLUTE: 0.2 THOU/MM3 (ref 0–0.4)
ERYTHROCYTE [DISTWIDTH] IN BLOOD BY AUTOMATED COUNT: 13.9 % (ref 11.5–14.5)
HBA1C MFR BLD HPLC: 6.7 % (ref 4–6)
HCT VFR BLD AUTO: 49 % (ref 42–52)
HGB BLD-MCNC: 16.6 GM/DL (ref 14–18)
IMM GRANULOCYTES # BLD AUTO: 0.01 THOU/MM3 (ref 0–0.07)
IMM GRANULOCYTES NFR BLD AUTO: 0.1 %
LYMPHOCYTES ABSOLUTE: 2.1 THOU/MM3 (ref 1–4.8)
LYMPHOCYTES NFR BLD AUTO: 28.6 %
MCH RBC QN AUTO: 32.5 PG (ref 26–33)
MCHC RBC AUTO-ENTMCNC: 33.9 GM/DL (ref 32.2–35.5)
MCV RBC AUTO: 96.1 FL (ref 80–94)
MICROALBUMIN UR-MCNC: 27.8 MG/DL
MICROALBUMIN/CREAT RATIO PNL UR: 248 MG/G (ref 0–30)
MONOCYTES ABSOLUTE: 0.8 THOU/MM3 (ref 0.4–1.3)
MONOCYTES NFR BLD AUTO: 11 %
NEUTROPHILS ABSOLUTE: 4.2 THOU/MM3 (ref 1.8–7.7)
NEUTROPHILS NFR BLD AUTO: 56.6 %
NRBC BLD AUTO-RTO: 0 /100 WBC
PLATELET # BLD AUTO: 221 THOU/MM3 (ref 130–400)
PMV BLD AUTO: 9.5 FL (ref 9.4–12.4)
PSA SERPL-MCNC: 0.58 NG/ML (ref 0–1)
RBC # BLD AUTO: 5.1 MILL/MM3 (ref 4.7–6.1)
TSH SERPL DL<=0.05 MIU/L-ACNC: 1.45 UIU/ML (ref 0.27–4.2)
WBC # BLD AUTO: 7.5 THOU/MM3 (ref 4.8–10.8)

## 2025-03-04 ENCOUNTER — TELEPHONE (OUTPATIENT)
Dept: CARDIOLOGY CLINIC | Age: 65
End: 2025-03-04

## 2025-03-04 ENCOUNTER — OFFICE VISIT (OUTPATIENT)
Dept: CARDIOLOGY CLINIC | Age: 65
End: 2025-03-04
Payer: MEDICAID

## 2025-03-04 VITALS
DIASTOLIC BLOOD PRESSURE: 70 MMHG | BODY MASS INDEX: 26.54 KG/M2 | HEART RATE: 82 BPM | WEIGHT: 185.4 LBS | SYSTOLIC BLOOD PRESSURE: 110 MMHG | HEIGHT: 70 IN

## 2025-03-04 DIAGNOSIS — Z72.0 TOBACCO ABUSE: ICD-10-CM

## 2025-03-04 DIAGNOSIS — E78.00 PURE HYPERCHOLESTEROLEMIA: ICD-10-CM

## 2025-03-04 DIAGNOSIS — I25.10 CORONARY ARTERY DISEASE INVOLVING NATIVE CORONARY ARTERY OF NATIVE HEART WITHOUT ANGINA PECTORIS: Primary | ICD-10-CM

## 2025-03-04 DIAGNOSIS — I25.5 ISCHEMIC CARDIOMYOPATHY: ICD-10-CM

## 2025-03-04 DIAGNOSIS — I10 PRIMARY HYPERTENSION: ICD-10-CM

## 2025-03-04 DIAGNOSIS — Z95.1 S/P CABG X 2: ICD-10-CM

## 2025-03-04 DIAGNOSIS — Z95.810 ICD (IMPLANTABLE CARDIOVERTER-DEFIBRILLATOR) IN PLACE: ICD-10-CM

## 2025-03-04 DIAGNOSIS — I50.42 CHRONIC COMBINED SYSTOLIC AND DIASTOLIC CONGESTIVE HEART FAILURE (HCC): ICD-10-CM

## 2025-03-04 DIAGNOSIS — I47.29 NSVT (NONSUSTAINED VENTRICULAR TACHYCARDIA) (HCC): ICD-10-CM

## 2025-03-04 DIAGNOSIS — R60.0 BILATERAL LEG EDEMA: ICD-10-CM

## 2025-03-04 PROCEDURE — G8419 CALC BMI OUT NRM PARAM NOF/U: HCPCS | Performed by: INTERNAL MEDICINE

## 2025-03-04 PROCEDURE — 4004F PT TOBACCO SCREEN RCVD TLK: CPT | Performed by: INTERNAL MEDICINE

## 2025-03-04 PROCEDURE — 3074F SYST BP LT 130 MM HG: CPT | Performed by: INTERNAL MEDICINE

## 2025-03-04 PROCEDURE — 3078F DIAST BP <80 MM HG: CPT | Performed by: INTERNAL MEDICINE

## 2025-03-04 PROCEDURE — 99214 OFFICE O/P EST MOD 30 MIN: CPT | Performed by: INTERNAL MEDICINE

## 2025-03-04 PROCEDURE — G8427 DOCREV CUR MEDS BY ELIG CLIN: HCPCS | Performed by: INTERNAL MEDICINE

## 2025-03-04 PROCEDURE — 3017F COLORECTAL CA SCREEN DOC REV: CPT | Performed by: INTERNAL MEDICINE

## 2025-03-04 NOTE — PROGRESS NOTES
the Angio-Seal closure device.     RECOMMENDATIONS:  At this point, we recommend to continue aggressive  medical treatment, risk factor modification and periodic followup.     The patient needs to be on dual antiplatelet treatments.  The patient  could be considered for intervention of the ostium and the proximal LAD  to supply large septal and diagonal branch.  The patient will be  monitored overnight.           OLIVIA GRAYSON M.D.     D: 10/25/2022 12:50:3    EKG 4/2023 nSR, no acute abn    EKG 7/29/2024  Sinus Rhythm   -RSR(V1) -nondiagnostic.  -Left atrial enlargement.  Low voltage -possible pulmonary disease.  ABNORMAL        Assessment   Diagnosis Orders   1. Coronary artery disease involving native coronary artery of native heart without angina pectoris  Echo (TTE) complete (PRN contrast/bubble/strain/3D)    Lipid Panel    Hepatic Function Panel    Magnesium    Basic Metabolic Panel      2. Chronic combined systolic and diastolic congestive heart failure (HCC)  Echo (TTE) complete (PRN contrast/bubble/strain/3D)    Lipid Panel    Hepatic Function Panel    Magnesium    Basic Metabolic Panel      3. Ischemic cardiomyopathy  Echo (TTE) complete (PRN contrast/bubble/strain/3D)    Lipid Panel    Hepatic Function Panel    Magnesium    Basic Metabolic Panel      4. Pure hypercholesterolemia  Echo (TTE) complete (PRN contrast/bubble/strain/3D)    Lipid Panel    Hepatic Function Panel    Magnesium    Basic Metabolic Panel      5. Primary hypertension  Echo (TTE) complete (PRN contrast/bubble/strain/3D)    Lipid Panel    Hepatic Function Panel    Magnesium    Basic Metabolic Panel      6. NSVT (nonsustained ventricular tachycardia) (HCC) on ICD check 2024  Echo (TTE) complete (PRN contrast/bubble/strain/3D)    Lipid Panel    Hepatic Function Panel    Magnesium    Basic Metabolic Panel      7. Bilateral leg edema +1 TO +2 and now trace  Echo (TTE) complete (PRN contrast/bubble/strain/3D)    Lipid Panel    Hepatic

## 2025-03-10 RX ORDER — HYDROXYZINE HYDROCHLORIDE 25 MG/1
25 TABLET, FILM COATED ORAL NIGHTLY
Qty: 30 TABLET | Refills: 0 | Status: SHIPPED | OUTPATIENT
Start: 2025-03-10

## 2025-03-11 ENCOUNTER — HOSPITAL ENCOUNTER (OUTPATIENT)
Age: 65
Discharge: HOME | End: 2025-03-13
Attending: INTERNAL MEDICINE
Payer: MEDICAID

## 2025-03-11 DIAGNOSIS — I25.10 CORONARY ARTERY DISEASE INVOLVING NATIVE CORONARY ARTERY OF NATIVE HEART WITHOUT ANGINA PECTORIS: ICD-10-CM

## 2025-03-11 DIAGNOSIS — R60.0 BILATERAL LEG EDEMA: ICD-10-CM

## 2025-03-11 DIAGNOSIS — I25.5 ISCHEMIC CARDIOMYOPATHY: ICD-10-CM

## 2025-03-11 DIAGNOSIS — Z72.0 TOBACCO ABUSE: ICD-10-CM

## 2025-03-11 DIAGNOSIS — I50.42 CHRONIC COMBINED SYSTOLIC AND DIASTOLIC CONGESTIVE HEART FAILURE (HCC): ICD-10-CM

## 2025-03-11 DIAGNOSIS — I10 PRIMARY HYPERTENSION: ICD-10-CM

## 2025-03-11 DIAGNOSIS — I47.29 NSVT (NONSUSTAINED VENTRICULAR TACHYCARDIA) (HCC): ICD-10-CM

## 2025-03-11 DIAGNOSIS — Z95.1 S/P CABG X 2: ICD-10-CM

## 2025-03-11 DIAGNOSIS — E78.00 PURE HYPERCHOLESTEROLEMIA: ICD-10-CM

## 2025-03-11 DIAGNOSIS — Z95.810 ICD (IMPLANTABLE CARDIOVERTER-DEFIBRILLATOR) IN PLACE: ICD-10-CM

## 2025-03-11 LAB
ECHO AO ASC DIAM: 2.8 CM
ECHO AV CUSP MM: 2.1 CM
ECHO AV PEAK GRADIENT: 5 MMHG
ECHO AV PEAK VELOCITY: 1.1 M/S
ECHO AV VELOCITY RATIO: 0.91
ECHO LA AREA 2C: 16.4 CM2
ECHO LA AREA 4C: 15.5 CM2
ECHO LA DIAMETER: 3.5 CM
ECHO LA MAJOR AXIS: 4.9 CM
ECHO LA MINOR AXIS: 4.5 CM
ECHO LA VOL BP: 45 ML (ref 18–58)
ECHO LA VOL MOD A2C: 48 ML (ref 18–58)
ECHO LA VOL MOD A4C: 39 ML (ref 18–58)
ECHO LV E' LATERAL VELOCITY: 10.1 CM/S
ECHO LV E' SEPTAL VELOCITY: 8.9 CM/S
ECHO LV EDV A2C: 100 ML
ECHO LV EDV A4C: 95 ML
ECHO LV EF PHYSICIAN: 25 %
ECHO LV EJECTION FRACTION A2C: 34 %
ECHO LV EJECTION FRACTION A4C: 31 %
ECHO LV ESV A2C: 65 ML
ECHO LV ESV A4C: 66 ML
ECHO LV FRACTIONAL SHORTENING: 15 % (ref 28–44)
ECHO LV INTERNAL DIMENSION DIASTOLIC: 5.3 CM (ref 4.2–5.9)
ECHO LV INTERNAL DIMENSION SYSTOLIC: 4.5 CM
ECHO LV ISOVOLUMETRIC RELAXATION TIME (IVRT): 74 MS
ECHO LV IVSD: 0.7 CM (ref 0.6–1)
ECHO LV MASS 2D: 162.1 G (ref 88–224)
ECHO LV POSTERIOR WALL DIASTOLIC: 1 CM (ref 0.6–1)
ECHO LV RELATIVE WALL THICKNESS RATIO: 0.38
ECHO LVOT PEAK GRADIENT: 4 MMHG
ECHO LVOT PEAK VELOCITY: 1 M/S
ECHO MV A VELOCITY: 1 M/S
ECHO MV E DECELERATION TIME (DT): 187 MS
ECHO MV E VELOCITY: 0.79 M/S
ECHO MV E/A RATIO: 0.79
ECHO MV E/E' LATERAL: 7.82
ECHO MV E/E' RATIO (AVERAGED): 8.35
ECHO MV E/E' SEPTAL: 8.88
ECHO PV MAX VELOCITY: 0.5 M/S
ECHO PV PEAK GRADIENT: 1 MMHG
ECHO RV INTERNAL DIMENSION: 2.4 CM
ECHO RV TAPSE: 1.6 CM (ref 1.7–?)
ECHO TV E WAVE: 0.4 M/S

## 2025-03-11 PROCEDURE — 93306 TTE W/DOPPLER COMPLETE: CPT | Performed by: INTERNAL MEDICINE

## 2025-03-11 PROCEDURE — 93306 TTE W/DOPPLER COMPLETE: CPT

## 2025-03-11 RX ORDER — METOPROLOL SUCCINATE 50 MG/1
50 TABLET, EXTENDED RELEASE ORAL DAILY
Qty: 30 TABLET | Refills: 5 | Status: SHIPPED | OUTPATIENT
Start: 2025-03-11

## 2025-04-04 RX ORDER — DULAGLUTIDE 1.5 MG/.5ML
INJECTION, SOLUTION SUBCUTANEOUS
Qty: 6 ML | Refills: 0 | Status: SHIPPED | OUTPATIENT
Start: 2025-04-04

## 2025-04-29 RX ORDER — CLOPIDOGREL BISULFATE 75 MG/1
75 TABLET ORAL DAILY
Qty: 90 TABLET | Refills: 1 | Status: SHIPPED | OUTPATIENT
Start: 2025-04-29

## 2025-05-05 ENCOUNTER — COMMUNITY OUTREACH (OUTPATIENT)
Dept: FAMILY MEDICINE CLINIC | Age: 65
End: 2025-05-05

## 2025-05-22 RX ORDER — SACUBITRIL AND VALSARTAN 24; 26 MG/1; MG/1
1 TABLET, FILM COATED ORAL 2 TIMES DAILY
Qty: 180 TABLET | Refills: 0 | Status: SHIPPED | OUTPATIENT
Start: 2025-05-22

## 2025-05-23 RX ORDER — BUMETANIDE 1 MG/1
1 TABLET ORAL DAILY
Qty: 90 TABLET | Refills: 1 | Status: SHIPPED | OUTPATIENT
Start: 2025-05-23

## 2025-06-20 PROCEDURE — 93297 REM INTERROG DEV EVAL ICPMS: CPT | Performed by: INTERNAL MEDICINE

## 2025-06-23 RX ORDER — DULAGLUTIDE 1.5 MG/.5ML
INJECTION, SOLUTION SUBCUTANEOUS
Qty: 6 ML | Refills: 1 | Status: SHIPPED | OUTPATIENT
Start: 2025-06-23

## 2025-07-02 ENCOUNTER — OFFICE VISIT (OUTPATIENT)
Dept: FAMILY MEDICINE CLINIC | Age: 65
End: 2025-07-02
Payer: MEDICARE

## 2025-07-02 VITALS
HEIGHT: 70 IN | BODY MASS INDEX: 26.67 KG/M2 | WEIGHT: 186.3 LBS | DIASTOLIC BLOOD PRESSURE: 70 MMHG | RESPIRATION RATE: 20 BRPM | SYSTOLIC BLOOD PRESSURE: 110 MMHG | HEART RATE: 80 BPM

## 2025-07-02 DIAGNOSIS — E11.40 DIABETIC NEUROPATHY, PAINFUL (HCC): ICD-10-CM

## 2025-07-02 DIAGNOSIS — J44.9 CHRONIC OBSTRUCTIVE PULMONARY DISEASE, UNSPECIFIED COPD TYPE (HCC): ICD-10-CM

## 2025-07-02 DIAGNOSIS — E78.00 PURE HYPERCHOLESTEROLEMIA: ICD-10-CM

## 2025-07-02 DIAGNOSIS — E11.9 CONTROLLED TYPE 2 DIABETES MELLITUS WITHOUT COMPLICATION, WITHOUT LONG-TERM CURRENT USE OF INSULIN (HCC): Primary | ICD-10-CM

## 2025-07-02 DIAGNOSIS — I10 ESSENTIAL HYPERTENSION: ICD-10-CM

## 2025-07-02 DIAGNOSIS — Z12.5 SCREENING FOR PROSTATE CANCER: ICD-10-CM

## 2025-07-02 DIAGNOSIS — I25.10 CORONARY ARTERY DISEASE INVOLVING NATIVE CORONARY ARTERY OF NATIVE HEART WITHOUT ANGINA PECTORIS: ICD-10-CM

## 2025-07-02 PROCEDURE — 1123F ACP DISCUSS/DSCN MKR DOCD: CPT | Performed by: FAMILY MEDICINE

## 2025-07-02 PROCEDURE — 3044F HG A1C LEVEL LT 7.0%: CPT | Performed by: FAMILY MEDICINE

## 2025-07-02 PROCEDURE — G2211 COMPLEX E/M VISIT ADD ON: HCPCS | Performed by: FAMILY MEDICINE

## 2025-07-02 PROCEDURE — 3078F DIAST BP <80 MM HG: CPT | Performed by: FAMILY MEDICINE

## 2025-07-02 PROCEDURE — 99214 OFFICE O/P EST MOD 30 MIN: CPT | Performed by: FAMILY MEDICINE

## 2025-07-02 PROCEDURE — 3074F SYST BP LT 130 MM HG: CPT | Performed by: FAMILY MEDICINE

## 2025-07-02 SDOH — ECONOMIC STABILITY: FOOD INSECURITY: WITHIN THE PAST 12 MONTHS, YOU WORRIED THAT YOUR FOOD WOULD RUN OUT BEFORE YOU GOT MONEY TO BUY MORE.: NEVER TRUE

## 2025-07-02 SDOH — ECONOMIC STABILITY: FOOD INSECURITY: WITHIN THE PAST 12 MONTHS, THE FOOD YOU BOUGHT JUST DIDN'T LAST AND YOU DIDN'T HAVE MONEY TO GET MORE.: NEVER TRUE

## 2025-07-02 ASSESSMENT — ENCOUNTER SYMPTOMS
GASTROINTESTINAL NEGATIVE: 1
RESPIRATORY NEGATIVE: 1

## 2025-07-02 NOTE — PROGRESS NOTES
Tereso Hathaway (:  1960) is a 65 y.o. male,Established patient, here for evaluation of the following chief complaint(s):  6 Month Follow-Up, Diabetes, and Health Maintenance (Due for A1C and colonoscopy )          Subjective   SUBJECTIVE/OBJECTIVE:  HPI  Chief Complaint   Patient presents with    6 Month Follow-Up    Diabetes    Health Maintenance     Due for A1C and colonoscopy      6 month eval.    Lab Results   Component Value Date    LABA1C 6.7 (H) 2025    LABA1C 6.9 (H) 05/10/2024    LABA1C 8.1 (H) 11/10/2023     Lab Results   Component Value Date    GLUF 142 (H) 2017    MALBCR 58 (H) 2021    CREATININE 0.8 2024     BP Readings from Last 3 Encounters:   25 110/70   25 110/70   24 118/74     Wt Readings from Last 3 Encounters:   25 84.5 kg (186 lb 4.8 oz)   25 84.1 kg (185 lb 6.4 oz)   24 85.6 kg (188 lb 12.8 oz)       Patient Active Problem List   Diagnosis    COPD (chronic obstructive pulmonary disease) (HCC)    CAD (coronary artery disease)    Post PTCA with MI    Hyperlipidemia    HTN (hypertension)    Tobacco abuse    Polyp of colon    Abnormal stress ECG with treadmill    Diabetes mellitus type II, uncontrolled    S/P CABG x 2    Uncontrolled type 2 diabetes mellitus, without long-term current use of insulin    Mild left ventricular systolic dysfunction    Ventricular tachycardia (HCC)    Ischemic cardiomyopathy    [employer] dual ICD    Bronchitis    Tobacco dependence syndrome    NSVT (nonsustained ventricular tachycardia) (HCC) on ICD check     Intermittent palpitations    Chronic combined systolic and diastolic congestive heart failure (HCC)    Bilateral leg edema +1 TO +2 and now trace    Diabetic neuropathy, painful (HCC)       Current Outpatient Medications   Medication Sig Dispense Refill    dulaglutide (TRULICITY) 1.5 MG/0.5ML SC injection INJECT 1 SYRINGE SUBCUTANEOUSLY ONCE A WEEK 6 mL 1    bumetanide (BUMEX) 1 MG tablet

## 2025-07-02 NOTE — PATIENT INSTRUCTIONS
You may receive a survey about your visit with us today. The feedback from our patients helps us identify what is working well and where the service to all patients can be enhanced. Thank you!     Tobacco Cessation Programs     Telephonic behavior modification  1-800-QUIT-NOW (561-0452)  Counseling service for those who are ready to quit using tobacco.    Available for uninsured [place] residents, Medicaid recipients, pregnant women, or patients whose health plans or employers are members of the [place] [employer]    Online behavior modification  http://[place].[employer]uitlogix.org  Online support program to help patients through each step of the quitting process.  Available 24 hours a day 7 days a week.  Provides up to date researched based tool, step-by-step guides, and motivational messages.    Online behavior modification  www.lungusa.org/stop-smoking/how-to-quit  HelpLine: 1-800-LUNG-[place] (695-4238)  Email questions to:  questions@clickTRUEcenter.org   Website offers resources to help tobacco users figure out their reasons for quitting and then take the big step of quitting for good.    Hypnosis  Location: [address] [place] [address]  Contact: Indra Alegria, PhD at [phone]  Hypnosis for tobacco cessation  Cost $225 for the initial session and $175 for each session afterwards.  Most patients require 6-8 sessions.  There is the option to submit through the patient’s insurance.    Hypnosis and behavior modification  Location: [address] [place] [address]  Contact: Alea Cook, PhD at [phone]  Counseling and hypnosis for nicotine addition  Cost: For uninsured patients:  Please call above phone number  Cost for insured patients depends on patient’s insurance plan.    Behavior modification  Location: [facility], [address] [place] [address]  Contact: [phone]  Services include four one-on-one appointments between the patient and a respiratory therapist.  The four appointments

## 2025-07-04 NOTE — PROGRESS NOTES
Cardiology Progress Note      Patient:  Rachael Cotton  YOB: 1960  MRN: [id number]   Acct: [de-identified]  Admit Date:  10/29/2022  Primary Cardiologist:  Fifi Elkins  Per DR Delgado's note:  \"Reason for Consultation:  VT        History Of Present Illness:    58 y.o. pleasant male c hx of CAD s/p CABG, recent PCI of OM, LV dysfunction EF 30%, COPD, DM, HLD who presented to the hospital with complaints of shortness of breath. As per patient he has been continuously short of breath since 10/20/2022 when he underwent PCI of OM. He states that he went home the following day and has been having intermittent shoulder pain and body aches. Continue to have shortness of breath but he finally seek medical attention today as it was getting worse. In the ER he was noted to be in wide-complex tachycardia rhythm. He underwent defibrillation with 160 J which converted to normal sinus rhythm. Post cardioversion patient reported he felt better. Of note reviewing his recent cardiac cath showed a OM branch was lost during the PCI. Discussed about cardiac catheterization but patient wanted to watch the symptoms as he was getting better post defibrillation. He is mildly hypotensive when started on amiodarone drip. Of note he has severe LV dysfunction with severe coronary artery disease. All labs, EKG's, diagnostic testing and images as well as cardiac cath, stress testing were reviewed during this encounter. \"    Subjective (Events in last 24 hours):   Pt awake, alert. NAD. Denies cp or sob currently. Feeling much improved today. Reviewed monitor reuslts with patient. Also discussed lifevest to prevent SCD and r/b/I/a. He is agreeable.      Objective:   /67   Pulse 79   Temp 98.1 °F (36.7 °C) (Oral)   Resp 20   Wt 190 lb (86.2 kg)   SpO2 100%   BMI 27.26 kg/m²      vss  TELEMETRY: nsr, 2 episodes of NSVT    Physical Exam:  General Appearance: alert and oriented to person, place and time, in no acute distress  Cardiovascular: normal rate, regular rhythm, normal S1 and S2, no murmurs, rubs, clicks, or gallops, distal pulses intact, no carotid bruits, no JVD  Pulmonary/Chest: clear to auscultation bilaterally- no wheezes, rales or rhonchi, normal air movement, no respiratory distress  Abdomen: soft, non-tender, non-distended, normal bowel sounds, no masses   Extremities: no cyanosis, clubbing or edema, pulse   Skin: warm and dry, no rash or erythema  Neurological: alert, oriented, normal speech, no focal findings or movement disorder noted    Medications:    sodium chloride flush  5-40 mL IntraVENous 2 times per day    insulin lispro  0-4 Units SubCUTAneous Q4H    aspirin  325 mg Oral Daily    atorvastatin  80 mg Oral Nightly    clopidogrel  75 mg Oral Daily      sodium chloride      dextrose       sodium chloride flush, 5-40 mL, PRN  sodium chloride, , PRN  ondansetron, 4 mg, Q8H PRN   Or  ondansetron, 4 mg, Q6H PRN  polyethylene glycol, 17 g, Daily PRN  acetaminophen, 650 mg, Q6H PRN   Or  acetaminophen, 650 mg, Q6H PRN  potassium chloride, 20 mEq, PRN   Or  potassium chloride, 10 mEq, PRN  magnesium sulfate, 2,000 mg, PRN  sodium phosphate IVPB, 10 mmol, PRN   Or  sodium phosphate IVPB, 15 mmol, PRN   Or  sodium phosphate IVPB, 20 mmol, PRN  glucose, 4 tablet, PRN  dextrose bolus, 125 mL, PRN   Or  dextrose bolus, 250 mL, PRN  glucagon (rDNA), 1 mg, PRN  dextrose, , Continuous PRN        Diagnostics:  EKG:     Echo:     Stress:     Left Heart Cath:     Lab Data:    Cardiac Enzymes:  No results for input(s): CKTOTAL, CKMB, CKMBINDEX, TROPONINI in the last 72 hours.     CBC:   Lab Results   Component Value Date/Time    WBC 8.7 10/30/2022 05:20 AM    RBC 3.89 10/30/2022 05:20 AM    RBC 4.19 09/02/2011 07:06 AM    HGB 12.6 10/30/2022 05:20 AM    HCT 37.4 10/30/2022 05:20 AM     10/30/2022 05:20 AM       CMP:    Lab Results   Component Value Date/Time     10/30/2022 05:20 AM    K 4.3 10/30/2022 05:20 AM CL 96 10/30/2022 05:20 AM    CO2 18 10/30/2022 05:20 AM    BUN 32 10/30/2022 05:20 AM    CREATININE 0.8 10/30/2022 05:20 AM    AGRATIO 1.8 11/18/2015 08:16 AM    LABGLOM >60 10/30/2022 05:25 AM    GLUCOSE 171 10/30/2022 05:20 AM    GLUCOSE 139 11/18/2015 08:16 AM    CALCIUM 9.1 10/30/2022 05:20 AM       Hepatic Function Panel:    Lab Results   Component Value Date/Time    ALKPHOS 186 10/30/2022 05:20 AM     10/30/2022 05:20 AM     10/30/2022 05:20 AM    PROT 6.8 10/30/2022 05:20 AM    BILITOT 0.5 10/30/2022 05:20 AM    BILIDIR <0.2 10/30/2022 05:20 AM    LABALBU 3.6 10/30/2022 05:20 AM    LABALBU 4.0 09/02/2011 07:06 AM       Magnesium:    Lab Results   Component Value Date/Time    MG 2.5 10/29/2022 10:39 AM       PT/INR:    Lab Results   Component Value Date/Time    INR 1.35 10/29/2022 10:39 AM       HgBA1c:    Lab Results   Component Value Date/Time    LABA1C 8.7 10/29/2022 10:39 AM       FLP:    Lab Results   Component Value Date/Time    TRIG 135 11/29/2021 07:43 AM    HDL 46 11/29/2021 07:43 AM    LDLCALC 163 11/29/2021 07:43 AM    LDLDIRECT 103 10/31/2015 08:57 AM       TSH:    Lab Results   Component Value Date/Time    TSH 2.290 11/29/2021 07:43 AM         Assessment:  Sustained VT s/p defibrillation  Acute on chronic CHF  Hypotension d/t VT  Acute on chronic systolic CHF-  EF 58-93%  Cad s/p prior CABg  S/p recent PCI of OM  DM  Smoker  COPD  HLD    Case d/w Dr Jeffrey Sinha. Plan:  Symptoms have resolved. No further chest pain or SOB. Needs lifevest prior to discharge. Will order today. Amio is CI currently d/t elevated LFT. Most recent LFT prior to admit was 2018, normal at that time. Consider in the future if LFT return to normal.     Restart metoprolol 25 mg daily. Continue plavix, cut statin in half for elevated LFTs, lipitor 40 mg daily. Okay for step down today.      Cardiology will follow for med adjustments if needed and lifevest.      F/u with DR Rosenda Walsh for evaluation of ICD, soon.    Electronically signed by Juanito Sarkar PA-C on 10/30/2022 at 11:57 AM None

## 2025-07-07 RX ORDER — EMPAGLIFLOZIN AND METFORMIN HYDROCHLORIDE 5; 1000 MG/1; MG/1
1 TABLET ORAL 2 TIMES DAILY
Qty: 60 TABLET | Refills: 5 | Status: SHIPPED | OUTPATIENT
Start: 2025-07-07

## 2025-07-16 ENCOUNTER — CLINICAL SUPPORT (OUTPATIENT)
Dept: CARDIOLOGY CLINIC | Age: 65
End: 2025-07-16

## 2025-07-16 DIAGNOSIS — Z95.810 ICD (IMPLANTABLE CARDIOVERTER-DEFIBRILLATOR) IN PLACE: Primary | ICD-10-CM

## 2025-07-22 PROCEDURE — 93297 REM INTERROG DEV EVAL ICPMS: CPT | Performed by: INTERNAL MEDICINE

## 2025-08-18 RX ORDER — SACUBITRIL AND VALSARTAN 24; 26 MG/1; MG/1
1 TABLET, FILM COATED ORAL 2 TIMES DAILY
Qty: 180 TABLET | Refills: 0 | Status: SHIPPED | OUTPATIENT
Start: 2025-08-18

## 2025-09-03 RX ORDER — METOPROLOL SUCCINATE 50 MG/1
50 TABLET, EXTENDED RELEASE ORAL DAILY
Qty: 90 TABLET | Refills: 0 | Status: SHIPPED | OUTPATIENT
Start: 2025-09-03